# Patient Record
Sex: FEMALE | Race: WHITE | Employment: UNEMPLOYED | URBAN - METROPOLITAN AREA
[De-identification: names, ages, dates, MRNs, and addresses within clinical notes are randomized per-mention and may not be internally consistent; named-entity substitution may affect disease eponyms.]

---

## 2018-03-15 ENCOUNTER — HOSPITAL ENCOUNTER (INPATIENT)
Age: 58
LOS: 5 days | Discharge: HOME HEALTH | DRG: 637 | End: 2018-03-20
Attending: EMERGENCY MEDICINE | Admitting: EMERGENCY MEDICINE
Payer: MEDICARE

## 2018-03-15 DIAGNOSIS — E08.10 DIABETIC KETOACIDOSIS WITHOUT COMA ASSOCIATED WITH DIABETES MELLITUS DUE TO UNDERLYING CONDITION (HCC): Primary | ICD-10-CM

## 2018-03-15 PROBLEM — E11.10 DKA (DIABETIC KETOACIDOSES): Status: ACTIVE | Noted: 2018-03-15

## 2018-03-15 PROBLEM — N17.9 AKI (ACUTE KIDNEY INJURY) (HCC): Status: ACTIVE | Noted: 2018-03-15

## 2018-03-15 LAB
ADMINISTERED INITIALS, ADMINIT: NORMAL
ALBUMIN SERPL-MCNC: 3.6 G/DL (ref 3.5–5)
ALBUMIN/GLOB SERPL: 1.2 {RATIO} (ref 1.1–2.2)
ALP SERPL-CCNC: 142 U/L (ref 45–117)
ALT SERPL-CCNC: 11 U/L (ref 12–78)
ANION GAP SERPL CALC-SCNC: 20 MMOL/L (ref 5–15)
AST SERPL-CCNC: 9 U/L (ref 15–37)
BILIRUB SERPL-MCNC: 0.6 MG/DL (ref 0.2–1)
BUN SERPL-MCNC: 43 MG/DL (ref 6–20)
BUN/CREAT SERPL: 17 (ref 12–20)
CALCIUM SERPL-MCNC: 7.7 MG/DL (ref 8.5–10.1)
CHLORIDE SERPL-SCNC: 94 MMOL/L (ref 97–108)
CO2 SERPL-SCNC: 19 MMOL/L (ref 21–32)
CREAT SERPL-MCNC: 2.48 MG/DL (ref 0.55–1.02)
D50 ADMINISTERED, D50ADM: 0 ML
D50 ORDER, D50ORD: 0 ML
EST. AVERAGE GLUCOSE BLD GHB EST-MCNC: 189 MG/DL
GLOBULIN SER CALC-MCNC: 3.1 G/DL (ref 2–4)
GLSCOM COMMENTS: NORMAL
GLUCOSE BLD STRIP.AUTO-MCNC: 183 MG/DL (ref 65–100)
GLUCOSE BLD STRIP.AUTO-MCNC: 185 MG/DL (ref 65–100)
GLUCOSE BLD STRIP.AUTO-MCNC: 208 MG/DL (ref 65–100)
GLUCOSE BLD STRIP.AUTO-MCNC: 271 MG/DL (ref 65–100)
GLUCOSE BLD STRIP.AUTO-MCNC: 285 MG/DL (ref 65–100)
GLUCOSE BLD STRIP.AUTO-MCNC: 341 MG/DL (ref 65–100)
GLUCOSE SERPL-MCNC: 324 MG/DL (ref 65–100)
GLUCOSE, GLC: 183 MG/DL
GLUCOSE, GLC: 185 MG/DL
GLUCOSE, GLC: 208 MG/DL
GLUCOSE, GLC: 271 MG/DL
GLUCOSE, GLC: 285 MG/DL
HBA1C MFR BLD: 8.2 % (ref 4.2–6.3)
HIGH TARGET, HITG: 300 MG/DL
INSULIN ADMINSTERED, INSADM: 0 UNITS/HOUR
INSULIN ADMINSTERED, INSADM: 1.2 UNITS/HOUR
INSULIN ADMINSTERED, INSADM: 3 UNITS/HOUR
INSULIN ADMINSTERED, INSADM: 4.2 UNITS/HOUR
INSULIN ADMINSTERED, INSADM: 4.5 UNITS/HOUR
INSULIN ORDER, INSORD: 0 UNITS/HOUR
INSULIN ORDER, INSORD: 1.2 UNITS/HOUR
INSULIN ORDER, INSORD: 3 UNITS/HOUR
INSULIN ORDER, INSORD: 4.2 UNITS/HOUR
INSULIN ORDER, INSORD: 4.5 UNITS/HOUR
LOW TARGET, LOT: 200 MG/DL
MINUTES UNTIL NEXT BG, NBG: 60 MIN
MULTIPLIER, MUL: 0
MULTIPLIER, MUL: 0.01
MULTIPLIER, MUL: 0.02
ORDER INITIALS, ORDINIT: NORMAL
POTASSIUM SERPL-SCNC: 3.8 MMOL/L (ref 3.5–5.1)
PROT SERPL-MCNC: 6.7 G/DL (ref 6.4–8.2)
SERVICE CMNT-IMP: ABNORMAL
SODIUM SERPL-SCNC: 133 MMOL/L (ref 136–145)

## 2018-03-15 PROCEDURE — 74011000250 HC RX REV CODE- 250: Performed by: EMERGENCY MEDICINE

## 2018-03-15 PROCEDURE — 96375 TX/PRO/DX INJ NEW DRUG ADDON: CPT

## 2018-03-15 PROCEDURE — 96366 THER/PROPH/DIAG IV INF ADDON: CPT

## 2018-03-15 PROCEDURE — 96365 THER/PROPH/DIAG IV INF INIT: CPT

## 2018-03-15 PROCEDURE — 77030029684 HC NEB SM VOL KT MONA -A

## 2018-03-15 PROCEDURE — 99285 EMERGENCY DEPT VISIT HI MDM: CPT

## 2018-03-15 PROCEDURE — 82962 GLUCOSE BLOOD TEST: CPT

## 2018-03-15 PROCEDURE — 83036 HEMOGLOBIN GLYCOSYLATED A1C: CPT | Performed by: EMERGENCY MEDICINE

## 2018-03-15 PROCEDURE — 74011250636 HC RX REV CODE- 250/636: Performed by: EMERGENCY MEDICINE

## 2018-03-15 PROCEDURE — 80053 COMPREHEN METABOLIC PANEL: CPT | Performed by: EMERGENCY MEDICINE

## 2018-03-15 PROCEDURE — 36415 COLL VENOUS BLD VENIPUNCTURE: CPT | Performed by: EMERGENCY MEDICINE

## 2018-03-15 PROCEDURE — 74011636637 HC RX REV CODE- 636/637: Performed by: EMERGENCY MEDICINE

## 2018-03-15 PROCEDURE — 65660000000 HC RM CCU STEPDOWN

## 2018-03-15 PROCEDURE — 74011000258 HC RX REV CODE- 258: Performed by: EMERGENCY MEDICINE

## 2018-03-15 PROCEDURE — 99284 EMERGENCY DEPT VISIT MOD MDM: CPT

## 2018-03-15 RX ORDER — DEXTROSE 50 % IN WATER (D50W) INTRAVENOUS SYRINGE
12.5-25 AS NEEDED
Status: DISCONTINUED | OUTPATIENT
Start: 2018-03-15 | End: 2018-03-17 | Stop reason: SDUPTHER

## 2018-03-15 RX ORDER — LEVOTHYROXINE SODIUM 150 UG/1
100 TABLET ORAL
COMMUNITY
End: 2022-03-30

## 2018-03-15 RX ORDER — DIPHENHYDRAMINE HCL 25 MG
25 CAPSULE ORAL
Status: DISCONTINUED | OUTPATIENT
Start: 2018-03-15 | End: 2018-03-20 | Stop reason: HOSPADM

## 2018-03-15 RX ORDER — NALOXONE HYDROCHLORIDE 0.4 MG/ML
0.4 INJECTION, SOLUTION INTRAMUSCULAR; INTRAVENOUS; SUBCUTANEOUS AS NEEDED
Status: DISCONTINUED | OUTPATIENT
Start: 2018-03-15 | End: 2018-03-20 | Stop reason: HOSPADM

## 2018-03-15 RX ORDER — HYDROCODONE BITARTRATE AND ACETAMINOPHEN 5; 325 MG/1; MG/1
1 TABLET ORAL
Status: DISCONTINUED | OUTPATIENT
Start: 2018-03-15 | End: 2018-03-20 | Stop reason: HOSPADM

## 2018-03-15 RX ORDER — SODIUM CHLORIDE 0.9 % (FLUSH) 0.9 %
5-10 SYRINGE (ML) INJECTION EVERY 8 HOURS
Status: DISCONTINUED | OUTPATIENT
Start: 2018-03-15 | End: 2018-03-20 | Stop reason: HOSPADM

## 2018-03-15 RX ORDER — SODIUM CHLORIDE 9 MG/ML
100 INJECTION, SOLUTION INTRAVENOUS CONTINUOUS
Status: DISCONTINUED | OUTPATIENT
Start: 2018-03-15 | End: 2018-03-17

## 2018-03-15 RX ORDER — FAMOTIDINE 10 MG/ML
20 INJECTION INTRAVENOUS DAILY
Status: DISCONTINUED | OUTPATIENT
Start: 2018-03-15 | End: 2018-03-16

## 2018-03-15 RX ORDER — SODIUM CHLORIDE 0.9 % (FLUSH) 0.9 %
5-10 SYRINGE (ML) INJECTION AS NEEDED
Status: DISCONTINUED | OUTPATIENT
Start: 2018-03-15 | End: 2018-03-20 | Stop reason: HOSPADM

## 2018-03-15 RX ORDER — AMLODIPINE BESYLATE 5 MG/1
5 TABLET ORAL DAILY
Status: DISCONTINUED | OUTPATIENT
Start: 2018-03-16 | End: 2018-03-17

## 2018-03-15 RX ORDER — ONDANSETRON 2 MG/ML
4 INJECTION INTRAMUSCULAR; INTRAVENOUS
Status: DISCONTINUED | OUTPATIENT
Start: 2018-03-15 | End: 2018-03-20 | Stop reason: HOSPADM

## 2018-03-15 RX ORDER — HEPARIN SODIUM 5000 [USP'U]/ML
5000 INJECTION, SOLUTION INTRAVENOUS; SUBCUTANEOUS EVERY 8 HOURS
Status: DISCONTINUED | OUTPATIENT
Start: 2018-03-15 | End: 2018-03-18

## 2018-03-15 RX ORDER — INSULIN LISPRO 100 [IU]/ML
INJECTION, SOLUTION INTRAVENOUS; SUBCUTANEOUS
Status: DISCONTINUED | OUTPATIENT
Start: 2018-03-15 | End: 2018-03-16

## 2018-03-15 RX ORDER — ASPIRIN 81 MG/1
81 TABLET ORAL DAILY
Status: DISCONTINUED | OUTPATIENT
Start: 2018-03-16 | End: 2018-03-20 | Stop reason: HOSPADM

## 2018-03-15 RX ORDER — METOCLOPRAMIDE HYDROCHLORIDE 5 MG/ML
5 INJECTION INTRAMUSCULAR; INTRAVENOUS EVERY 6 HOURS
Status: DISCONTINUED | OUTPATIENT
Start: 2018-03-16 | End: 2018-03-16

## 2018-03-15 RX ORDER — MAGNESIUM SULFATE 100 %
4 CRYSTALS MISCELLANEOUS AS NEEDED
Status: DISCONTINUED | OUTPATIENT
Start: 2018-03-15 | End: 2018-03-17 | Stop reason: SDUPTHER

## 2018-03-15 RX ORDER — LEVOTHYROXINE SODIUM 150 UG/1
150 TABLET ORAL
Status: DISCONTINUED | OUTPATIENT
Start: 2018-03-16 | End: 2018-03-20 | Stop reason: HOSPADM

## 2018-03-15 RX ORDER — ONDANSETRON 2 MG/ML
4 INJECTION INTRAMUSCULAR; INTRAVENOUS
Status: COMPLETED | OUTPATIENT
Start: 2018-03-15 | End: 2018-03-15

## 2018-03-15 RX ORDER — POTASSIUM CHLORIDE 750 MG/1
10 TABLET, FILM COATED, EXTENDED RELEASE ORAL 2 TIMES DAILY
COMMUNITY
End: 2018-03-20

## 2018-03-15 RX ORDER — IPRATROPIUM BROMIDE AND ALBUTEROL SULFATE 2.5; .5 MG/3ML; MG/3ML
3 SOLUTION RESPIRATORY (INHALATION)
Status: DISCONTINUED | OUTPATIENT
Start: 2018-03-15 | End: 2018-03-16

## 2018-03-15 RX ORDER — ACETAMINOPHEN 325 MG/1
650 TABLET ORAL
Status: DISCONTINUED | OUTPATIENT
Start: 2018-03-15 | End: 2018-03-20 | Stop reason: HOSPADM

## 2018-03-15 RX ADMIN — SODIUM CHLORIDE 100 ML/HR: 900 INJECTION, SOLUTION INTRAVENOUS at 21:35

## 2018-03-15 RX ADMIN — SODIUM CHLORIDE 4.5 UNITS/HR: 900 INJECTION, SOLUTION INTRAVENOUS at 18:28

## 2018-03-15 RX ADMIN — HEPARIN SODIUM 5000 UNITS: 5000 INJECTION, SOLUTION INTRAVENOUS; SUBCUTANEOUS at 21:29

## 2018-03-15 RX ADMIN — FAMOTIDINE 20 MG: 10 INJECTION INTRAVENOUS at 21:29

## 2018-03-15 RX ADMIN — ONDANSETRON HYDROCHLORIDE 4 MG: 2 INJECTION, SOLUTION INTRAMUSCULAR; INTRAVENOUS at 17:24

## 2018-03-15 NOTE — ED NOTES
Assumed care of patient via EMS transfer from [facility]. Pt was sent over from [facility] for high blood sugar. Pt was placed on an insulin drip there and was transferred via ambulance receiving 2 units/hr. Pt BS upon arrival 341. Pt complains of fatigue, hunger and thirst but denies pain at this time. Orders received to continue insulin drip per MD Adryan Ziegler.

## 2018-03-15 NOTE — ED PROVIDER NOTES
EMERGENCY DEPARTMENT HISTORY AND PHYSICAL EXAM      Date: 3/15/2018  Patient Name: Ashia Lawrence    History of Presenting Illness     Chief Complaint   Patient presents with    High Blood Sugar     Pt transferred from \[facility]\"" with BS >600. Pt on insulin drip at 2 units/hr. BS here is 341. History Provided By: Patient and EMS    HPI: Ashia Lawrence, 59 y.o. female with PMHx significant for IDDM, hypothyroidism, HTN, hypercholesterolemia, presents via EMS from \[facility]\"" to the ED with cc of moderately severe and intermittent nausea and vomiting over the last three days. Pt recalls missing her dose of insulin last night because she was afraid she would vomit. Pt was dx with DKA with h/o type 2 DM at \[facility]\"" and arrives at [facility] for admission to the hospitalist. Pt's last BG taken at \[facility]\"" was 367 at [phone]. Her repeat BG on arrival to [facility] is 341. She arrives on O2 NC and an insulin drip running at 2u/hour. Pt c/o polyphagia and polydipsia. She specifically denies any F/C, cough, recent illnesses, CP, SOB, abd pain or other sx. BP remains WNL, last BP taken before departure was 124/94. She denies any changes in her doses of insulin. PCP: Kaci Ramsey MD    There are no other complaints, changes, or physical findings at this time.     Current Facility-Administered Medications   Medication Dose Route Frequency Provider Last Rate Last Dose    insulin regular (NOVOLIN R, HUMULIN R) 100 Units in 0.9% sodium chloride 100 mL infusion  0-50 Units/hr IntraVENous TITRATE Claudia Meza DO 4.5 mL/hr at 03/15/18 1828 4.5 Units/hr at 03/15/18 1828    insulin lispro (HUMALOG) injection   SubCUTAneous TIDAC Claudia Meza DO   Stopped at 03/15/18 1650    glucose chewable tablet 16 g  4 Tab Oral PRN Claudia Meza DO        dextrose (D50W) injection syrg 12.5-25 g  12.5-25 g IntraVENous PRN Claudia Meza DO        glucagon (GLUCAGEN) injection 1 mg  1 mg IntraMUSCular PRN Claudia Meza DO         Current Outpatient Prescriptions   Medication Sig Dispense Refill    potassium chloride SR (KLOR-CON 10) 10 mEq tablet Take 1 Tab by mouth daily. 30 Tab 0    amLODIPine (NORVASC) 5 mg tablet Take 5 mg by mouth daily.  silver sulfADIAZINE (SILVADENE) 1 % topical cream Apply  to affected area every other day. 400 g 1    naproxen (NAPROSYN) 500 mg tablet Take one pill every 8 hours as needed for pain. Take with food. 30 Tab 2    HYDROcodone-acetaminophen (NORCO) 5-325 mg per tablet Take  by mouth every six (6) hours as needed.  insulin aspart (NOVOLOG FLEXPEN) 100 unit/mL flexpen 5 Units by SubCUTAneous route Before breakfast, lunch, and dinner.  gabapentin (NEURONTIN) 300 mg capsule Take 300 mg by mouth. 2 tabs in AM  1 tab at lunch  2 tabs in PM      levothyroxine (LEVOXYL) 100 mcg tablet Take  by mouth Daily (before breakfast).  torsemide (DEMADEX) 20 mg tablet Take 40 mg by mouth two (2) times a day.  losartan (COZAAR) 25 mg tablet Take  by mouth daily.  insulin glargine (LANTUS) 100 unit/mL injection 7 Units by SubCUTAneous route nightly.  magnesium oxide (MAG-OX) 400 mg tablet Take 400 mg by mouth daily.  ZINC PO Take  by mouth.  metolazone (ZAROXOLYN) 5 mg tablet Take  by mouth every other day.  ipratropium-albuterol (COMBIVENT RESPIMAT)  mcg/actuation inhaler Take 1 Puff by inhalation every six (6) hours.  aspirin delayed-release 81 mg tablet Take  by mouth daily.          Past History     Past Medical History:  Past Medical History:   Diagnosis Date    Arthritis     Asthma     Bronchitis     GERD (gastroesophageal reflux disease)     Hypercholesterolemia     Hypertension     Hypothyroid     IDDM (insulin dependent diabetes mellitus) (HCC)     Morbid obesity (HCC)     Peripheral neuropathy     Thyroid disease     Venous insufficiency        Past Surgical History:  Past Surgical History:   Procedure Laterality Date    HX AMPUTATION      HX COLONOSCOPY 2015       Family History:  Family History   Problem Relation Age of Onset    COPD Mother     COPD Father        Social History:  Social History   Substance Use Topics    Smoking status: Current Every Day Smoker     Packs/day: 0.50     Types: Cigarettes     Last attempt to quit: 12/12/2012    Smokeless tobacco: Never Used    Alcohol use Yes      Comment: social       Allergies:  No Known Allergies      Review of Systems   Review of Systems   Constitutional: Negative for fatigue and fever. HENT: Negative. Eyes: Negative. Respiratory: Negative for cough, shortness of breath and wheezing. Cardiovascular: Negative for chest pain and leg swelling. Gastrointestinal: Positive for nausea and vomiting. Negative for abdominal pain, blood in stool, constipation and diarrhea. Endocrine: Positive for polydipsia and polyphagia. Genitourinary: Negative for difficulty urinating and dysuria. Musculoskeletal: Negative. Skin: Negative for rash. Allergic/Immunologic: Negative. Neurological: Negative for weakness and numbness. Hematological: Negative. Psychiatric/Behavioral: Negative. Physical Exam   Physical Exam   Constitutional: She is oriented to person, place, and time. She appears well-developed and well-nourished. No distress. HENT:   Head: Normocephalic and atraumatic. Mouth/Throat: Oropharynx is clear and moist. Mucous membranes are dry. Eyes: Conjunctivae and EOM are normal.   Neck: Neck supple. No JVD present. No tracheal deviation present. Cardiovascular: Normal rate, regular rhythm and intact distal pulses. Exam reveals no gallop and no friction rub. No murmur heard. No peripheral edema   Pulmonary/Chest: Effort normal and breath sounds normal. No stridor. No respiratory distress. She has no wheezes. Abdominal: Soft. Bowel sounds are normal. She exhibits no distension and no mass. There is no tenderness. There is no guarding.    Musculoskeletal: Normal range of motion. She exhibits no edema or tenderness. No deformity   Neurological: She is alert and oriented to person, place, and time. She has normal strength. No focal deficits   Skin: Skin is warm, dry and intact. No rash noted. Psychiatric: She has a normal mood and affect. Her behavior is normal. Judgment and thought content normal.   Nursing note and vitals reviewed. Diagnostic Study Results     Labs -     Recent Results (from the past 12 hour(s))   GLUCOSE, POC, BEDSIDE    Collection Time: 03/15/18 10:40 AM   Result Value Ref Range    Glucose (POC) >600 () 65 - 100 mg/dL    Performed by [id number]     CBC WITH AUTOMATED DIFF    Collection Time: 03/15/18 10:45 AM   Result Value Ref Range    WBC 14.0 (H) 3.6 - 11.0 K/uL    RBC 5.74 (H) 3.80 - 5.20 M/uL    HGB 14.1 11.5 - 16.0 g/dL    HCT 46.9 35.0 - 47.0 %    MCV 81.7 80.0 - 99.0 FL    MCH 24.6 (L) 26.0 - 34.0 PG    MCHC 30.1 30.0 - 36.5 g/dL    RDW 14.6 (H) 11.5 - 14.5 %    PLATELET 284 375 - 425 K/uL    MPV 11.0 8.9 - 12.9 FL    NRBC 0.0 0  WBC    ABSOLUTE NRBC 0.00 0.00 - 0.01 K/uL    NEUTROPHILS 91 (H) 32 - 75 %    LYMPHOCYTES 4 (L) 12 - 49 %    MONOCYTES 4 (L) 5 - 13 %    EOSINOPHILS 0 0 - 7 %    BASOPHILS 0 0 - 1 %    IMMATURE GRANULOCYTES 1 (H) 0.0 - 0.5 %    ABS. NEUTROPHILS 12.7 (H) 1.8 - 8.0 K/UL    ABS. LYMPHOCYTES 0.6 (L) 0.8 - 3.5 K/UL    ABS. MONOCYTES 0.6 0.0 - 1.0 K/UL    ABS. EOSINOPHILS 0.0 0.0 - 0.4 K/UL    ABS. BASOPHILS 0.0 0.0 - 0.1 K/UL    ABS. IMM.  GRANS. 0.1 (H) 0.00 - 0.04 K/UL    DF SMEAR SCANNED      RBC COMMENTS NORMOCYTIC, NORMOCHROMIC     METABOLIC PANEL, COMPREHENSIVE    Collection Time: 03/15/18 10:45 AM   Result Value Ref Range    Sodium 133 (L) 136 - 145 mmol/L    Potassium 4.4 3.5 - 5.1 mmol/L    Chloride 87 (L) 97 - 108 mmol/L    CO2 10 (LL) 21 - 32 mmol/L    Anion gap 36 (H) 5 - 15 mmol/L    Glucose 680 (HH) 65 - 100 mg/dL    BUN 41 (H) 7.0 - 18.0 MG/DL    Creatinine 2.49 (H) 0.55 - 1.02 MG/DL    BUN/Creatinine ratio 16 12 - 20      GFR est AA 24 (L) >60 ml/min/1.73m2    GFR est non-AA 20 (L) >60 ml/min/1.73m2    Calcium 9.0 8.5 - 10.1 MG/DL    Bilirubin, total 0.8 0.2 - 1.0 MG/DL    ALT (SGPT) 15 14 - 63 U/L    AST (SGOT) 6 (L) 15 - 37 U/L    Alk.  phosphatase 165 (H) 45 - 117 U/L    Protein, total 7.7 6.4 - 8.2 g/dL    Albumin 4.0 3.5 - 5.0 g/dL    Globulin 3.7 2.0 - 4.0 g/dL    A-G Ratio 1.1 1.1 - 2.2     TSH 3RD GENERATION    Collection Time: 03/15/18 10:45 AM   Result Value Ref Range    TSH 2.45 0.34 - 4.82 uIU/mL   MAGNESIUM    Collection Time: 03/15/18 10:45 AM   Result Value Ref Range    Magnesium 1.8 1.6 - 2.4 mg/dL   URINALYSIS W/ RFLX MICROSCOPIC    Collection Time: 03/15/18 10:45 AM   Result Value Ref Range    Color YELLOW/STRAW      Appearance CLEAR CLEAR      Specific gravity 1.025 1.003 - 1.030      pH (UA) 5.5 5.0 - 8.0      Protein 30 (A) NEG mg/dL    Glucose 500 (A) NEG mg/dL    Ketone 80 (A) NEG mg/dL    Blood TRACE (A) NEG      Urobilinogen 0.2 0.2 - 1.0 EU/dL    Nitrites NEGATIVE  NEG      Leukocyte Esterase NEGATIVE  NEG     BILIRUBIN, CONFIRM    Collection Time: 03/15/18 10:45 AM   Result Value Ref Range    Bilirubin UA, confirm NEGATIVE  NEG     URINE MICROSCOPIC ONLY    Collection Time: 03/15/18 10:45 AM   Result Value Ref Range    WBC 0-4 0 - 4 /hpf    RBC 0-5 0 - 5 /hpf    Epithelial cells FEW FEW /lpf    Bacteria NEGATIVE  NEG /hpf   ACETONE/KETONE, QL    Collection Time: 03/15/18 12:30 PM   Result Value Ref Range    Acetone/Ketone serum, QL. POSITIVE (A) NEG        GLUCOSE, POC, BEDSIDE    Collection Time: 03/15/18 12:31 PM   Result Value Ref Range    Glucose (POC) 483 (H) 65 - 100 mg/dL    Performed by [id number]     POC G3 - PUL    Collection Time: 03/15/18  1:25 PM   Result Value Ref Range    FIO2 (POC) 28 %    pH (POC) 7.119 (LL) 7.35 - 7.45      pCO2 (POC) 36.0 35.0 - 45.0 MMHG    pO2 (POC) 82 80 - 100 MMHG    HCO3 (POC) 11.7 (L) 22 - 26 MMOL/L    sO2 (POC) 91 (L) 92 - 97 %    Base deficit (POC) 18 mmol/L    Site LEFT RADIAL      Device: NASAL CANNULA      Flow rate (POC) 2 L/M    Allens test (POC) YES      Specimen type (POC) ARTERIAL     EKG, 12 LEAD, INITIAL    Collection Time: 03/15/18  1:34 PM   Result Value Ref Range    Ventricular Rate 80 BPM    Atrial Rate 80 BPM    P-R Interval 150 ms    QRS Duration 130 ms    Q-T Interval 436 ms    QTC Calculation (Bezet) 502 ms    Calculated P Axis 72 degrees    Calculated R Axis 98 degrees    Calculated T Axis 37 degrees    Diagnosis       Normal sinus rhythm  Possible Left atrial enlargement  Right bundle branch block  Abnormal ECG  When compared with ECG of 24-JAN-2018 06:47,  premature ventricular complexes are no longer present  Right bundle branch block has replaced Incomplete right bundle branch block     GLUCOSE, POC, BEDSIDE    Collection Time: 03/15/18  1:40 PM   Result Value Ref Range    Glucose (POC) 401 (H) 65 - 100 mg/dL    Performed by [id number]     GLUCOSE, POC, BEDSIDE    Collection Time: 03/15/18  2:35 PM   Result Value Ref Range    Glucose (POC) 367 (H) 65 - 100 mg/dL    Performed by [id number]     GLUCOSE, POC    Collection Time: 03/15/18  4:40 PM   Result Value Ref Range    Glucose (POC) 341 (H) 65 - 100 mg/dL    Performed by Barb Burns    METABOLIC PANEL, COMPREHENSIVE    Collection Time: 03/15/18  5:25 PM   Result Value Ref Range    Sodium 133 (L) 136 - 145 mmol/L    Potassium 3.8 3.5 - 5.1 mmol/L    Chloride 94 (L) 97 - 108 mmol/L    CO2 19 (L) 21 - 32 mmol/L    Anion gap 20 (H) 5 - 15 mmol/L    Glucose 324 (H) 65 - 100 mg/dL    BUN 43 (H) 6 - 20 MG/DL    Creatinine 2.48 (H) 0.55 - 1.02 MG/DL    BUN/Creatinine ratio 17 12 - 20      GFR est AA 24 (L) >60 ml/min/1.73m2    GFR est non-AA 20 (L) >60 ml/min/1.73m2    Calcium 7.7 (L) 8.5 - 10.1 MG/DL    Bilirubin, total 0.6 0.2 - 1.0 MG/DL    ALT (SGPT) 11 (L) 12 - 78 U/L    AST (SGOT) 9 (L) 15 - 37 U/L    Alk.  phosphatase 142 (H) 45 - 117 U/L    Protein, total 6.7 6.4 - 8.2 g/dL    Albumin 3.6 3.5 - 5.0 g/dL    Globulin 3.1 2.0 - 4.0 g/dL    A-G Ratio 1.2 1.1 - 2.2     GLUCOSE, POC    Collection Time: 03/15/18  5:56 PM   Result Value Ref Range    Glucose (POC) 285 (H) 65 - 100 mg/dL    Performed by Patrick Pelayo    Collection Time: 03/15/18  6:29 PM   Result Value Ref Range    Glucose 285 mg/dL    Insulin order 4.5 units/hour    Insulin adminstered 4.5 units/hour    Multiplier 0.020     Low target 200 mg/dL    High target 300 mg/dL    D50 order 0.0 ml    D50 administered 0.00 ml    Minutes until next BG 60 min    Order initials RKJ     Administered initials LEG     GLSCOM Comments         Radiologic Studies -   No orders to display     CT Results  (Last 48 hours)    None        CXR Results  (Last 48 hours)               03/15/18 1347  XR CHEST PORT Final result    Impression:  IMPRESSION:         No acute cardiopulmonary process. Narrative:  CHEST, FRONTAL VIEW       INDICATION: Hyperglycemia. Weakness. COMPARISON: None. FINDINGS:        The heart and mediastinal structures are normal.  There is no acute airspace   consolidation, pleural fluid or pneumothorax. Pulmonary vascularity is normal.   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 No acute osseous findings. Medical Decision Making   I am the first provider for this patient. I reviewed the vital signs, available nursing notes, past medical history, past surgical history, family history and social history. Vital Signs-Reviewed the patient's vital signs. Patient Vitals for the past 12 hrs:   Temp Pulse Resp BP SpO2   03/15/18 1653 98.5 °F (36.9 °C) 80 16 155/62 93 %       Pulse Oximetry Analysis - 93% on RA      Records Reviewed: Nursing Notes and Old Medical Records    Provider Notes (Medical Decision Making):   Pt transferred from [facility] for DKA. Pt received 2 L IVF, is on insulin gtt. BP now stable.  Will recheck CMP, continue insulin gtt, and admit to hospitalist.     ED Course:   Initial assessment performed. The patients presenting problems have been discussed, and they are in agreement with the care plan formulated and outlined with them. I have encouraged them to ask questions as they arise throughout their visit. 4:45 PM  Discussed plan of care with pt including hospitalist admission. Pt agreeable with plan. Consult Note:   4:50 PM  Messi Donahue DO spoke with Roseann Silva MD   Specialty: Hospitalist  Discussed pt's hx, disposition, and available diagnostic and imaging results. Reviewed care plans. Consultant will evaluate pt for admission. Written by Flavio Denny ED Scribe, as dictated by Messi Donahue DO. Critical Care Time:   CRITICAL CARE NOTE:  5:41 PM  IMPENDING DETERIORATION -Metabolic, Renal  ASSOCIATED RISK FACTORS - Metabolic changes and Dehydration  MANAGEMENT- Bedside Assessment and Supervision of Care  INTERPRETATION - Labwork  INTERVENTIONS - hemodynamic mngmt and Metobolic interventions  CASE REVIEW - Hospitalist  TREATMENT RESPONSE -Improved  PERFORMED BY - Self    NOTES:   I have spent 30 minutes of critical care time involved in lab review, consultations with specialist, family decision- making, bedside attention and documentation. During this entire length of time I was immediately available to the patient. Messi Donahue DO     Disposition:  Admitted    Admit Note:  4:50 PM  Pt is being admitted by Dr. Roseann Silva hospitalist. The results of their tests and reason(s) for their admission have been discussed with pt and/or available family. They convey agreement and understanding for the need to be admitted and for admission diagnosis. PLAN: admit to hospitalist    Diagnosis     Clinical Impression:   1. Diabetic ketoacidosis without coma associated with diabetes mellitus due to underlying condition [facility])        Attestations:     This note is prepared by Flavio Denny, acting as Scribe for Messi Donahue DO. The scribe's documentation has been prepared under my direction and personally reviewed by me in its entirety. I confirm that the note above accurately reflects all work, treatment, procedures, and medical decision making performed by me.

## 2018-03-15 NOTE — PROGRESS NOTES
Pharmacy Clarification of Prior to Admission Medication Regimen     The patient was not interviewed regarding clarification of the prior to admission medication regimen due to AMS. MHT called the patient's mother, Osvaldo Hunter, [phone], who was able to verify the patient's PTA medication regimen and the last doses administered. Information Obtained From: Rx Query and patient's mother    Pertinent Pharmacy Findings:   Updated patients preferred outpatient pharmacy to: [place] [id number] ipratropium-albuterol (COMBIVENT RESPIMAT)  mcg/actuation inhaler: Patient's mother stated the patient has this agent at home and uses it \"as needed. \"    PTA medication list was corrected to the following:     Prior to Admission Medications   Prescriptions Last Dose Informant Patient Reported? Taking? ZINC PO 3/14/2018 at Unknown time Parent Yes Yes   Sig: Take 1 Tab by mouth two (2) times a day. amLODIPine (NORVASC) 5 mg tablet 3/14/2018 at Unknown time Parent Yes Yes   Sig: Take 5 mg by mouth daily. aspirin delayed-release 81 mg tablet 3/14/2018 at Unknown time Parent Yes Yes   Sig: Take 81 mg by mouth daily. gabapentin (NEURONTIN) 300 mg capsule 3/13/2018 at Unknown time Parent Yes Yes   Sig: Take 600 mg by mouth nightly. insulin aspart (NOVOLOG FLEXPEN) 100 unit/mL flexpen 3/14/2018 at Unknown time Parent Yes Yes   Si Units by SubCUTAneous route Before breakfast, lunch, and dinner. insulin glargine (LANTUS) 100 unit/mL injection 3/14/2018 at Unknown time Parent Yes Yes   Si Units by SubCUTAneous route nightly. ipratropium-albuterol (COMBIVENT RESPIMAT)  mcg/actuation inhaler Not Taking at Unknown time Parent Yes No   Sig: Take 1 Puff by inhalation every six (6) hours as needed for shortness of breath   levothyroxine (SYNTHROID) 150 mcg tablet 3/14/2018 at Unknown time Parent Yes Yes   Sig: Take 150 mcg by mouth Daily (before breakfast).    losartan (COZAAR) 25 mg tablet 3/14/2018 at Unknown time Parent Yes Yes   Sig: Take 25 mg by mouth daily. potassium chloride SR (KLOR-CON 10) 10 mEq tablet 3/14/2018 at Unknown time Parent Yes Yes   Sig: Take 10 mEq by mouth two (2) times a day. torsemide (DEMADEX) 20 mg tablet 3/14/2018 at Unknown time Parent Yes Yes   Sig: Take 40 mg by mouth two (2) times a day.       Facility-Administered Medications: None          Thank you,  Marie Smith CPhT  Medication History Pharmacy Technician

## 2018-03-15 NOTE — IP AVS SNAPSHOT
[address] [facility] 
[phone] Patient: Emily Oseguera MRN: [id number] About your hospitalization You were admitted on:  March 15, 2018 You last received care in the:  [facility] 2 GENERAL SURGERY You were discharged on:  March 20, 2018 Why you were hospitalized Your primary diagnosis was:  Not on File Your diagnoses also included:  Dka (Diabetic Ketoacidoses) (Formerly [facility]), Grover (Acute Kidney Injury) (Formerly [facility]) Follow-up Information Follow up With Details Comments Contact Info Fidel Boswell MD   [id number] [facility] 
[phone] [address] [place] [address] On 3/20/2018 THIS IS YOUR PRIVATE DUTY CARE AIDE, PLEASE CONTACT THEM WHEN YOU RETURN HOME  [phone] [address] [facility] On 3/20/2018 THIS IS YOUR HOME HEALTH AGENCY. IF YOU DO NOT HEAR FROM THEM WITHIN 24-48HRS, PLEASE CONTACT THEM DIRECTLY [phone] Discharge Orders None A check sarha beth indicates which time of day the medication should be taken. My Medications CHANGE how you take these medications Instructions Each Dose to Equal  
 Morning Noon Evening Bedtime  
 insulin aspart U-100 100 unit/mL Inpn Commonly known as:  NovoLOG Flexpen U-100 Insulin What changed:  additional instructions Your last dose was: Your next dose is:    
   
   
 5 Units by SubCUTAneous route Before breakfast, lunch, and dinner. + SSI TIDAC: 140-199=2 u 200-249=3 u 250-299=5 u 300-349=8 u  
 5 Units  
    
   
   
   
  
 levothyroxine 150 mcg tablet Commonly known as:  SYNTHROID What changed:  Another medication with the same name was removed. Continue taking this medication, and follow the directions you see here. Your last dose was: Your next dose is: Take 150 mcg by mouth Daily (before breakfast). 150 mcg CONTINUE taking these medications Instructions Each Dose to Equal  
 Morning Noon Evening Bedtime  
 aspirin delayed-release 81 mg tablet Your last dose was: Your next dose is: Take 81 mg by mouth daily. 81 mg  
    
   
   
   
  
 COMBIVENT RESPIMAT  mcg/actuation inhaler Generic drug:  ipratropium-albuterol Your last dose was: Your next dose is: Take 1 Puff by inhalation every six (6) hours as needed for Shortness of Breath. 1 Puff  
    
   
   
   
  
 gabapentin 300 mg capsule Commonly known as:  NEURONTIN Your last dose was: Your next dose is: Take 600 mg by mouth nightly. 600 mg  
    
   
   
   
  
 LANTUS U-100 INSULIN 100 unit/mL injection Generic drug:  insulin glargine Your last dose was: Your next dose is:    
   
   
 8 Units by SubCUTAneous route nightly. 8 Units ZINC PO Your last dose was: Your next dose is: Take 1 Tab by mouth two (2) times a day. 1 Tab STOP taking these medications   
 amLODIPine 5 mg tablet Commonly known as:  NORVASC  
   
  
 DEMADEX 20 mg tablet Generic drug:  torsemide  
   
  
 losartan 25 mg tablet Commonly known as:  COZAAR  
   
  
 potassium chloride SR 10 mEq tablet Commonly known as:  KLOR-CON 10 Where to Get Your Medications Information on where to get these meds will be given to you by the nurse or doctor. ! Ask your nurse or doctor about these medications  
  insulin aspart U-100 100 unit/mL Inpn Discharge Instructions HOSPITALIST DISCHARGE INSTRUCTIONS 
 
NAME: Emily Oseguera JOSE:  1960 MRN:  [id number] Date/Time:  3/20/2018 8:37 AM 
 
ADMIT DATE: 3/15/2018 DISCHARGE DATE: 3/20/2018 DISCHARGE DIAGNOSIS: 
Right Foot Pain, Acute Gout flare: resolved Uncontrolled type 2 diabetes with DKA:  
 Acute kidney injury on chronic kidney disease stage III; ANN resolved Leukocytosis: likely reactive and resolved Acute Pancreatitis: POA; resolved; suspect relation to uncontrolled BG Intractable N/V: from DKA, resolved GERD Thrombocytopenia: resolved Chronic venous stasis Hypothyroidism Hypertension Tobacco use and suspected COPD Intellectual Disability: Patient does not have capacity to make medical decisions for herself based on Evaluation by Dr. Green Burkitt on 3/16 MEDICATIONS: 
As per medication reconciliation  list 
· It is important that you take the medication exactly as they are prescribed. · Keep your medication in the bottles provided by the pharmacist and keep a list of the medication names, dosages, and times to be taken in your wallet. · Do not take other medications without consulting your doctor. Pain Management: per above medications What to do at [facility] Recommended diet:  Diabetic Diet Recommended activity: PT/OT Eval and Treat If you experience any of the following symptoms then please call your primary care physician or return to the emergency room if you cannot get hold of your doctor: 
Fever, chills, nausea, vomiting, diarrhea, change in mentation, falling, bleeding, shortness of breath or any other concerning symptoms. Follow Up: 
Dr. Olimpia Lemons MD  Within 1 week. Discuss your diabetes management and blood pressure at this visit. If your restart diuretics (torsemide) then you will need very close moniotring of your kidney function. I also stopped you norvasc as sometimes this medication can cause swelling in the legs. Information obtained by : 
I understand that if any problems occur once I am at home I am to contact my physician. I understand and acknowledge receipt of the instructions indicated above. Physician's or R.N.'s Signature                                                                  Date/Time Patient or Representative Signature                                                          Date/Time [employer] Announcement We are excited to announce that we are making your provider's discharge notes available to you in [employer]. You will see these notes when they are completed and signed by the physician that discharged you from your recent hospital stay. If you have any questions or concerns about any information you see in [employer], please call the Health Information Department where you were seen or reach out to your Primary Care Provider for more information about your plan of care. Introducing [facility] SERVICES! Denisse Gamboa introduces [employer] patient portal. Now you can access parts of your medical record, email your doctor's office, and request medication refills online. 1. In your internet browser, go to [email or web address] 2. Click on the First Time User? Click Here link in the Sign In box. You will see the New Member Sign Up page. 3. Enter your [employer] Access Code exactly as it appears below. You will not need to use this code after youve completed the sign-up process. If you do not sign up before the expiration date, you must request a new code. · [employer] Access Code: [email or web address] Expires: 4/24/2018  9:44 AM 
 
4. Enter the last four digits of your Social Security Number (xxxx) and Date of Birth (mm/dd/yyyy) as indicated and click Submit. You will be taken to the next sign-up page. 5. Create a [employer] ID. This will be your [employer] login ID and cannot be changed, so think of one that is secure and easy to remember. 6. Create a [employer] password. You can change your password at any time. 7. Enter your Password Reset Question and Answer. This can be used at a later time if you forget your password. 8. Enter your e-mail address. You will receive e-mail notification when new information is available in [address] 9. Click Sign Up. You can now view and download portions of your medical record. 10. Click the Download Summary menu link to download a portable copy of your medical information. If you have questions, please visit the Frequently Asked Questions section of the [employer] website. Remember, [employer] is NOT to be used for urgent needs. For medical emergencies, dial 911. Now available from your iPhone and Android! Providers Seen During Your Hospitalization Provider Specialty Primary office phone Ethel Elias DO Emergency Medicine [phone] Nabeel Ibrahim MD Emergency Medicine [phone] Rajeev Rouse MD Internal Medicine [phone] Your Primary Care Physician (PCP) Primary Care Physician Office Phone Office Fax Gene [facility] [phone] You are allergic to the following No active allergies Recent Documentation Height Weight BMI Smoking Status 1.549 m 105.7 kg 44.02 kg/m2 Current Every Day Smoker Emergency Contacts Name Discharge Info Relation Home Work Mobile [facility] DISCHARGE CAREGIVER [3] Parent [1] [phone] Patient Belongings The following personal items are in your possession at time of discharge: 
  Dental Appliances: None  Visual Aid: Glasses, At bedside      Home Medications: None   Jewelry: None  Clothing: None    Other Valuables: Cell Phone, [employer] Please provide this summary of care documentation to your next provider. Signatures-by signing, you are acknowledging that this After Visit Summary has been reviewed with you and you have received a copy.   
  
 
  
    
    
 Patient Signature: ____________________________________________________________ Date:  ____________________________________________________________  
  
Marlyse Leaks Provider Signature:  ____________________________________________________________ Date:  ____________________________________________________________

## 2018-03-16 ENCOUNTER — APPOINTMENT (OUTPATIENT)
Dept: ULTRASOUND IMAGING | Age: 58
DRG: 637 | End: 2018-03-16
Attending: INTERNAL MEDICINE
Payer: MEDICARE

## 2018-03-16 LAB
ADMINISTERED INITIALS, ADMINIT: NORMAL
ALBUMIN SERPL-MCNC: 3.6 G/DL (ref 3.5–5)
ALBUMIN/GLOB SERPL: 1.4 {RATIO} (ref 1.1–2.2)
ALP SERPL-CCNC: 136 U/L (ref 45–117)
ALT SERPL-CCNC: 11 U/L (ref 12–78)
AMYLASE SERPL-CCNC: 155 U/L (ref 25–115)
ANION GAP SERPL CALC-SCNC: 14 MMOL/L (ref 5–15)
ANION GAP SERPL CALC-SCNC: 16 MMOL/L (ref 5–15)
ANION GAP SERPL CALC-SCNC: 7 MMOL/L (ref 5–15)
ANION GAP SERPL CALC-SCNC: 8 MMOL/L (ref 5–15)
APPEARANCE UR: CLEAR
AST SERPL-CCNC: 8 U/L (ref 15–37)
BACTERIA URNS QL MICRO: ABNORMAL /HPF
BASOPHILS # BLD: 0 K/UL (ref 0–0.1)
BASOPHILS NFR BLD: 0 % (ref 0–1)
BILIRUB DIRECT SERPL-MCNC: 0.2 MG/DL (ref 0–0.2)
BILIRUB SERPL-MCNC: 0.8 MG/DL (ref 0.2–1)
BILIRUB UR QL CFM: NEGATIVE
BUN SERPL-MCNC: 44 MG/DL (ref 6–20)
BUN SERPL-MCNC: 46 MG/DL (ref 6–20)
BUN SERPL-MCNC: 47 MG/DL (ref 6–20)
BUN SERPL-MCNC: 48 MG/DL (ref 6–20)
BUN/CREAT SERPL: 17 (ref 12–20)
BUN/CREAT SERPL: 18 (ref 12–20)
CALCIUM SERPL-MCNC: 7.7 MG/DL (ref 8.5–10.1)
CALCIUM SERPL-MCNC: 7.7 MG/DL (ref 8.5–10.1)
CALCIUM SERPL-MCNC: 8 MG/DL (ref 8.5–10.1)
CALCIUM SERPL-MCNC: 8.4 MG/DL (ref 8.5–10.1)
CHLORIDE SERPL-SCNC: 100 MMOL/L (ref 97–108)
CHLORIDE SERPL-SCNC: 100 MMOL/L (ref 97–108)
CHLORIDE SERPL-SCNC: 102 MMOL/L (ref 97–108)
CHLORIDE SERPL-SCNC: 98 MMOL/L (ref 97–108)
CO2 SERPL-SCNC: 19 MMOL/L (ref 21–32)
CO2 SERPL-SCNC: 21 MMOL/L (ref 21–32)
CO2 SERPL-SCNC: 27 MMOL/L (ref 21–32)
CO2 SERPL-SCNC: 28 MMOL/L (ref 21–32)
COLOR UR: ABNORMAL
CREAT SERPL-MCNC: 2.63 MG/DL (ref 0.55–1.02)
CREAT SERPL-MCNC: 2.64 MG/DL (ref 0.55–1.02)
CREAT SERPL-MCNC: 2.64 MG/DL (ref 0.55–1.02)
CREAT SERPL-MCNC: 2.76 MG/DL (ref 0.55–1.02)
CREAT UR-MCNC: 163 MG/DL
D50 ADMINISTERED, D50ADM: 0 ML
D50 ORDER, D50ORD: 0 ML
DIFFERENTIAL METHOD BLD: ABNORMAL
EOSINOPHIL # BLD: 0 K/UL (ref 0–0.4)
EOSINOPHIL NFR BLD: 0 % (ref 0–7)
EPITH CASTS URNS QL MICRO: ABNORMAL /LPF
ERYTHROCYTE [DISTWIDTH] IN BLOOD BY AUTOMATED COUNT: 15 % (ref 11.5–14.5)
EST. AVERAGE GLUCOSE BLD GHB EST-MCNC: 183 MG/DL
GLOBULIN SER CALC-MCNC: 2.6 G/DL (ref 2–4)
GLSCOM COMMENTS: NORMAL
GLUCOSE BLD STRIP.AUTO-MCNC: 124 MG/DL (ref 65–100)
GLUCOSE BLD STRIP.AUTO-MCNC: 125 MG/DL (ref 65–100)
GLUCOSE BLD STRIP.AUTO-MCNC: 145 MG/DL (ref 65–100)
GLUCOSE BLD STRIP.AUTO-MCNC: 150 MG/DL (ref 65–100)
GLUCOSE BLD STRIP.AUTO-MCNC: 154 MG/DL (ref 65–100)
GLUCOSE BLD STRIP.AUTO-MCNC: 165 MG/DL (ref 65–100)
GLUCOSE BLD STRIP.AUTO-MCNC: 167 MG/DL (ref 65–100)
GLUCOSE BLD STRIP.AUTO-MCNC: 187 MG/DL (ref 65–100)
GLUCOSE BLD STRIP.AUTO-MCNC: 190 MG/DL (ref 65–100)
GLUCOSE BLD STRIP.AUTO-MCNC: 196 MG/DL (ref 65–100)
GLUCOSE BLD STRIP.AUTO-MCNC: 200 MG/DL (ref 65–100)
GLUCOSE BLD STRIP.AUTO-MCNC: 207 MG/DL (ref 65–100)
GLUCOSE BLD STRIP.AUTO-MCNC: 212 MG/DL (ref 65–100)
GLUCOSE BLD STRIP.AUTO-MCNC: 220 MG/DL (ref 65–100)
GLUCOSE BLD STRIP.AUTO-MCNC: 254 MG/DL (ref 65–100)
GLUCOSE BLD STRIP.AUTO-MCNC: 257 MG/DL (ref 65–100)
GLUCOSE BLD STRIP.AUTO-MCNC: 257 MG/DL (ref 65–100)
GLUCOSE BLD STRIP.AUTO-MCNC: 267 MG/DL (ref 65–100)
GLUCOSE SERPL-MCNC: 158 MG/DL (ref 65–100)
GLUCOSE SERPL-MCNC: 159 MG/DL (ref 65–100)
GLUCOSE SERPL-MCNC: 260 MG/DL (ref 65–100)
GLUCOSE SERPL-MCNC: 286 MG/DL (ref 65–100)
GLUCOSE UR STRIP.AUTO-MCNC: NEGATIVE MG/DL
GLUCOSE, GLC: 124 MG/DL
GLUCOSE, GLC: 125 MG/DL
GLUCOSE, GLC: 145 MG/DL
GLUCOSE, GLC: 150 MG/DL
GLUCOSE, GLC: 154 MG/DL
GLUCOSE, GLC: 165 MG/DL
GLUCOSE, GLC: 167 MG/DL
GLUCOSE, GLC: 187 MG/DL
GLUCOSE, GLC: 190 MG/DL
GLUCOSE, GLC: 200 MG/DL
GLUCOSE, GLC: 207 MG/DL
GLUCOSE, GLC: 220 MG/DL
GLUCOSE, GLC: 254 MG/DL
GLUCOSE, GLC: 257 MG/DL
GLUCOSE, GLC: 257 MG/DL
GLUCOSE, GLC: 267 MG/DL
HBA1C MFR BLD: 8 % (ref 4.2–6.3)
HCT VFR BLD AUTO: 41.3 % (ref 35–47)
HGB BLD-MCNC: 12.6 G/DL (ref 11.5–16)
HGB UR QL STRIP: NEGATIVE
HIGH TARGET, HITG: 250 MG/DL
HIGH TARGET, HITG: 300 MG/DL
IMM GRANULOCYTES # BLD: 0.1 K/UL (ref 0–0.04)
IMM GRANULOCYTES NFR BLD AUTO: 0 % (ref 0–0.5)
INSULIN ADMINSTERED, INSADM: 0.1 UNITS/HOUR
INSULIN ADMINSTERED, INSADM: 0.9 UNITS/HOUR
INSULIN ADMINSTERED, INSADM: 1.3 UNITS/HOUR
INSULIN ADMINSTERED, INSADM: 1.3 UNITS/HOUR
INSULIN ADMINSTERED, INSADM: 1.5 UNITS/HOUR
INSULIN ADMINSTERED, INSADM: 1.8 UNITS/HOUR
INSULIN ADMINSTERED, INSADM: 1.9 UNITS/HOUR
INSULIN ADMINSTERED, INSADM: 2.1 UNITS/HOUR
INSULIN ADMINSTERED, INSADM: 2.1 UNITS/HOUR
INSULIN ADMINSTERED, INSADM: 3.8 UNITS/HOUR
INSULIN ADMINSTERED, INSADM: 3.9 UNITS/HOUR
INSULIN ADMINSTERED, INSADM: 5.2 UNITS/HOUR
INSULIN ADMINSTERED, INSADM: 5.9 UNITS/HOUR
INSULIN ADMINSTERED, INSADM: 7.9 UNITS/HOUR
INSULIN ORDER, INSORD: 0.1 UNITS/HOUR
INSULIN ORDER, INSORD: 0.9 UNITS/HOUR
INSULIN ORDER, INSORD: 1.3 UNITS/HOUR
INSULIN ORDER, INSORD: 1.3 UNITS/HOUR
INSULIN ORDER, INSORD: 1.5 UNITS/HOUR
INSULIN ORDER, INSORD: 1.8 UNITS/HOUR
INSULIN ORDER, INSORD: 1.9 UNITS/HOUR
INSULIN ORDER, INSORD: 2.1 UNITS/HOUR
INSULIN ORDER, INSORD: 2.1 UNITS/HOUR
INSULIN ORDER, INSORD: 3.8 UNITS/HOUR
INSULIN ORDER, INSORD: 3.9 UNITS/HOUR
INSULIN ORDER, INSORD: 5.2 UNITS/HOUR
INSULIN ORDER, INSORD: 5.9 UNITS/HOUR
INSULIN ORDER, INSORD: 7.9 UNITS/HOUR
KETONES UR QL STRIP.AUTO: ABNORMAL MG/DL
LEUKOCYTE ESTERASE UR QL STRIP.AUTO: ABNORMAL
LIPASE SERPL-CCNC: 1205 U/L (ref 73–393)
LOW TARGET, LOT: 150 MG/DL
LOW TARGET, LOT: 200 MG/DL
LYMPHOCYTES # BLD: 1 K/UL (ref 0.8–3.5)
LYMPHOCYTES NFR BLD: 7 % (ref 12–49)
MAGNESIUM SERPL-MCNC: 1.6 MG/DL (ref 1.6–2.4)
MCH RBC QN AUTO: 24.2 PG (ref 26–34)
MCHC RBC AUTO-ENTMCNC: 30.5 G/DL (ref 30–36.5)
MCV RBC AUTO: 79.4 FL (ref 80–99)
MINUTES UNTIL NEXT BG, NBG: 60 MIN
MONOCYTES # BLD: 0.8 K/UL (ref 0–1)
MONOCYTES NFR BLD: 6 % (ref 5–13)
MULTIPLIER, MUL: 0
MULTIPLIER, MUL: 0.01
MULTIPLIER, MUL: 0.02
MULTIPLIER, MUL: 0.03
MULTIPLIER, MUL: 0.03
MULTIPLIER, MUL: 0.04
NEUTS SEG # BLD: 11.4 K/UL (ref 1.8–8)
NEUTS SEG NFR BLD: 86 % (ref 32–75)
NITRITE UR QL STRIP.AUTO: NEGATIVE
NRBC # BLD: 0 K/UL (ref 0–0.01)
NRBC BLD-RTO: 0 PER 100 WBC
ORDER INITIALS, ORDINIT: NORMAL
PH UR STRIP: 5.5 [PH] (ref 5–8)
PHOSPHATE SERPL-MCNC: 3.7 MG/DL (ref 2.6–4.7)
PLATELET # BLD AUTO: 245 K/UL (ref 150–400)
PMV BLD AUTO: 10 FL (ref 8.9–12.9)
POTASSIUM SERPL-SCNC: 3.6 MMOL/L (ref 3.5–5.1)
POTASSIUM SERPL-SCNC: 3.6 MMOL/L (ref 3.5–5.1)
POTASSIUM SERPL-SCNC: 4.4 MMOL/L (ref 3.5–5.1)
POTASSIUM SERPL-SCNC: 4.5 MMOL/L (ref 3.5–5.1)
PROT SERPL-MCNC: 6.2 G/DL (ref 6.4–8.2)
PROT UR STRIP-MCNC: ABNORMAL MG/DL
PROT UR-MCNC: 28 MG/DL (ref 0–11.9)
PROT/CREAT UR-RTO: 0.2
RBC # BLD AUTO: 5.2 M/UL (ref 3.8–5.2)
RBC #/AREA URNS HPF: ABNORMAL /HPF (ref 0–5)
SERVICE CMNT-IMP: ABNORMAL
SODIUM SERPL-SCNC: 133 MMOL/L (ref 136–145)
SODIUM SERPL-SCNC: 135 MMOL/L (ref 136–145)
SODIUM SERPL-SCNC: 135 MMOL/L (ref 136–145)
SODIUM SERPL-SCNC: 137 MMOL/L (ref 136–145)
SODIUM UR-SCNC: 7 MMOL/L
SP GR UR REFRACTOMETRY: 1.02 (ref 1–1.03)
TSH SERPL DL<=0.05 MIU/L-ACNC: 1.82 UIU/ML (ref 0.36–3.74)
UROBILINOGEN UR QL STRIP.AUTO: 0.2 EU/DL (ref 0.2–1)
WBC # BLD AUTO: 13.3 K/UL (ref 3.6–11)
WBC URNS QL MICRO: ABNORMAL /HPF (ref 0–4)

## 2018-03-16 PROCEDURE — 80048 BASIC METABOLIC PNL TOTAL CA: CPT | Performed by: INTERNAL MEDICINE

## 2018-03-16 PROCEDURE — 74011636637 HC RX REV CODE- 636/637: Performed by: INTERNAL MEDICINE

## 2018-03-16 PROCEDURE — 97116 GAIT TRAINING THERAPY: CPT | Performed by: PHYSICAL THERAPIST

## 2018-03-16 PROCEDURE — 74011000258 HC RX REV CODE- 258: Performed by: EMERGENCY MEDICINE

## 2018-03-16 PROCEDURE — 80048 BASIC METABOLIC PNL TOTAL CA: CPT

## 2018-03-16 PROCEDURE — G8979 MOBILITY GOAL STATUS: HCPCS | Performed by: PHYSICAL THERAPIST

## 2018-03-16 PROCEDURE — 82962 GLUCOSE BLOOD TEST: CPT

## 2018-03-16 PROCEDURE — 83735 ASSAY OF MAGNESIUM: CPT

## 2018-03-16 PROCEDURE — 85025 COMPLETE CBC W/AUTO DIFF WBC: CPT

## 2018-03-16 PROCEDURE — 80076 HEPATIC FUNCTION PANEL: CPT

## 2018-03-16 PROCEDURE — 74011250637 HC RX REV CODE- 250/637: Performed by: EMERGENCY MEDICINE

## 2018-03-16 PROCEDURE — 36415 COLL VENOUS BLD VENIPUNCTURE: CPT

## 2018-03-16 PROCEDURE — 84443 ASSAY THYROID STIM HORMONE: CPT

## 2018-03-16 PROCEDURE — 97161 PT EVAL LOW COMPLEX 20 MIN: CPT | Performed by: PHYSICAL THERAPIST

## 2018-03-16 PROCEDURE — 77030038269 HC DRN EXT URIN PURWCK BARD -A

## 2018-03-16 PROCEDURE — 84156 ASSAY OF PROTEIN URINE: CPT | Performed by: INTERNAL MEDICINE

## 2018-03-16 PROCEDURE — 74011250636 HC RX REV CODE- 250/636: Performed by: EMERGENCY MEDICINE

## 2018-03-16 PROCEDURE — 77010033678 HC OXYGEN DAILY

## 2018-03-16 PROCEDURE — 82150 ASSAY OF AMYLASE: CPT

## 2018-03-16 PROCEDURE — 74011636637 HC RX REV CODE- 636/637: Performed by: EMERGENCY MEDICINE

## 2018-03-16 PROCEDURE — 83690 ASSAY OF LIPASE: CPT

## 2018-03-16 PROCEDURE — 84100 ASSAY OF PHOSPHORUS: CPT

## 2018-03-16 PROCEDURE — G8978 MOBILITY CURRENT STATUS: HCPCS | Performed by: PHYSICAL THERAPIST

## 2018-03-16 PROCEDURE — 76770 US EXAM ABDO BACK WALL COMP: CPT

## 2018-03-16 PROCEDURE — 65660000000 HC RM CCU STEPDOWN

## 2018-03-16 PROCEDURE — 74011000258 HC RX REV CODE- 258: Performed by: INTERNAL MEDICINE

## 2018-03-16 PROCEDURE — 83036 HEMOGLOBIN GLYCOSYLATED A1C: CPT

## 2018-03-16 PROCEDURE — 84300 ASSAY OF URINE SODIUM: CPT | Performed by: INTERNAL MEDICINE

## 2018-03-16 PROCEDURE — 81001 URINALYSIS AUTO W/SCOPE: CPT | Performed by: INTERNAL MEDICINE

## 2018-03-16 RX ORDER — DEXTROSE MONOHYDRATE AND SODIUM CHLORIDE 5; .9 G/100ML; G/100ML
100 INJECTION, SOLUTION INTRAVENOUS CONTINUOUS
Status: DISPENSED | OUTPATIENT
Start: 2018-03-16 | End: 2018-03-16

## 2018-03-16 RX ORDER — HYDRALAZINE HYDROCHLORIDE 20 MG/ML
10 INJECTION INTRAMUSCULAR; INTRAVENOUS
Status: DISCONTINUED | OUTPATIENT
Start: 2018-03-16 | End: 2018-03-20 | Stop reason: HOSPADM

## 2018-03-16 RX ORDER — INSULIN LISPRO 100 [IU]/ML
INJECTION, SOLUTION INTRAVENOUS; SUBCUTANEOUS
Status: DISCONTINUED | OUTPATIENT
Start: 2018-03-16 | End: 2018-03-17

## 2018-03-16 RX ORDER — NICOTINE 7MG/24HR
1 PATCH, TRANSDERMAL 24 HOURS TRANSDERMAL
Status: DISCONTINUED | OUTPATIENT
Start: 2018-03-16 | End: 2018-03-20 | Stop reason: HOSPADM

## 2018-03-16 RX ORDER — DEXTROSE 50 % IN WATER (D50W) INTRAVENOUS SYRINGE
12.5-25 AS NEEDED
Status: DISCONTINUED | OUTPATIENT
Start: 2018-03-16 | End: 2018-03-18 | Stop reason: SDUPTHER

## 2018-03-16 RX ORDER — MAGNESIUM SULFATE 100 %
4 CRYSTALS MISCELLANEOUS AS NEEDED
Status: DISCONTINUED | OUTPATIENT
Start: 2018-03-16 | End: 2018-03-20 | Stop reason: HOSPADM

## 2018-03-16 RX ORDER — IPRATROPIUM BROMIDE AND ALBUTEROL SULFATE 2.5; .5 MG/3ML; MG/3ML
3 SOLUTION RESPIRATORY (INHALATION)
Status: DISCONTINUED | OUTPATIENT
Start: 2018-03-16 | End: 2018-03-20 | Stop reason: HOSPADM

## 2018-03-16 RX ORDER — INSULIN LISPRO 100 [IU]/ML
5 INJECTION, SOLUTION INTRAVENOUS; SUBCUTANEOUS
Status: DISCONTINUED | OUTPATIENT
Start: 2018-03-16 | End: 2018-03-18

## 2018-03-16 RX ORDER — FAMOTIDINE 20 MG/1
20 TABLET, FILM COATED ORAL
Status: DISCONTINUED | OUTPATIENT
Start: 2018-03-17 | End: 2018-03-20 | Stop reason: HOSPADM

## 2018-03-16 RX ORDER — INSULIN GLARGINE 100 [IU]/ML
8 INJECTION, SOLUTION SUBCUTANEOUS
Status: DISCONTINUED | OUTPATIENT
Start: 2018-03-16 | End: 2018-03-17

## 2018-03-16 RX ORDER — METOCLOPRAMIDE HYDROCHLORIDE 5 MG/ML
5 INJECTION INTRAMUSCULAR; INTRAVENOUS
Status: DISCONTINUED | OUTPATIENT
Start: 2018-03-16 | End: 2018-03-20 | Stop reason: HOSPADM

## 2018-03-16 RX ADMIN — INSULIN LISPRO 1 UNITS: 100 INJECTION, SOLUTION INTRAVENOUS; SUBCUTANEOUS at 22:07

## 2018-03-16 RX ADMIN — HEPARIN SODIUM 5000 UNITS: 5000 INJECTION, SOLUTION INTRAVENOUS; SUBCUTANEOUS at 04:30

## 2018-03-16 RX ADMIN — LEVOTHYROXINE SODIUM 150 MCG: 150 TABLET ORAL at 09:29

## 2018-03-16 RX ADMIN — ONDANSETRON 4 MG: 2 INJECTION INTRAMUSCULAR; INTRAVENOUS at 01:16

## 2018-03-16 RX ADMIN — SODIUM CHLORIDE 0.9 UNITS/HR: 900 INJECTION, SOLUTION INTRAVENOUS at 18:46

## 2018-03-16 RX ADMIN — INSULIN GLARGINE 8 UNITS: 100 INJECTION, SOLUTION SUBCUTANEOUS at 19:49

## 2018-03-16 RX ADMIN — Medication 10 ML: at 21:14

## 2018-03-16 RX ADMIN — ACETAMINOPHEN 650 MG: 325 TABLET ORAL at 09:29

## 2018-03-16 RX ADMIN — Medication 10 ML: at 13:48

## 2018-03-16 RX ADMIN — SODIUM CHLORIDE 2.1 UNITS/HR: 900 INJECTION, SOLUTION INTRAVENOUS at 14:52

## 2018-03-16 RX ADMIN — SODIUM CHLORIDE 1.9 UNITS/HR: 900 INJECTION, SOLUTION INTRAVENOUS at 11:26

## 2018-03-16 RX ADMIN — SODIUM CHLORIDE 100 ML/HR: 900 INJECTION, SOLUTION INTRAVENOUS at 22:53

## 2018-03-16 RX ADMIN — DEXTROSE MONOHYDRATE AND SODIUM CHLORIDE 100 ML/HR: 5; .9 INJECTION, SOLUTION INTRAVENOUS at 14:52

## 2018-03-16 RX ADMIN — HEPARIN SODIUM 5000 UNITS: 5000 INJECTION, SOLUTION INTRAVENOUS; SUBCUTANEOUS at 21:14

## 2018-03-16 RX ADMIN — Medication 10 ML: at 05:23

## 2018-03-16 RX ADMIN — AMLODIPINE BESYLATE 5 MG: 5 TABLET ORAL at 09:29

## 2018-03-16 RX ADMIN — SODIUM CHLORIDE 1.8 UNITS/HR: 900 INJECTION, SOLUTION INTRAVENOUS at 13:37

## 2018-03-16 RX ADMIN — DEXTROSE MONOHYDRATE AND SODIUM CHLORIDE 100 ML/HR: 5; .9 INJECTION, SOLUTION INTRAVENOUS at 07:50

## 2018-03-16 RX ADMIN — ASPIRIN 81 MG: 81 TABLET, COATED ORAL at 09:29

## 2018-03-16 RX ADMIN — DEXTROSE MONOHYDRATE AND SODIUM CHLORIDE 100 ML/HR: 5; .9 INJECTION, SOLUTION INTRAVENOUS at 01:15

## 2018-03-16 RX ADMIN — HEPARIN SODIUM 5000 UNITS: 5000 INJECTION, SOLUTION INTRAVENOUS; SUBCUTANEOUS at 13:48

## 2018-03-16 RX ADMIN — METOCLOPRAMIDE 5 MG: 5 INJECTION, SOLUTION INTRAMUSCULAR; INTRAVENOUS at 11:32

## 2018-03-16 NOTE — CONSULTS
Consultation Note    NAME: Donato Hanley   :  1960   MRN:  [id number]     Date/Time:  3/16/2018 2:53 PM    I have been asked to see this patient by Dr. Jyoti Huntley  for advice/opinion re: ANN on CKD 3. Assessment :    Plan:  ANN on CKD stage 3  DKA  HTN  Morbid obesity  Intractable n/v prior to admission Creatinine previously 1.4 () and is now 2.6; SHAHRZAD last year (Normal right kidney. Left kidney not adequately visualized due to obscuring bowel gas. Postvoid residual was 40 mL). [place] urine in January. Repeat urinalysis. Check FeNa. Repeat SHAHRZAD pending. I wonder if ATN from severe pre-renal insult on presentation (n/v, DKA, ARB, loop diuretic). Continue diuretic hold, arb hold and with ivf's. Subjective:   CHIEF COMPLAINT:  ann on ckd stage 3    HISTORY OF PRESENT ILLNESS:     Kiesha Garcia is a 59 y.o.   female who has a history of DM for almost 30 years, HTN and obesity. She lives on her own. Her sister takes her shopping. Not very active; on a nice day she will go for a short walk. States she was in her normal state of health until a few days before admission. Developed severe n/v. No diarrhea. She was lightheaded and was lightheaded when walking earlier today. She denies difficulty with urination. She has not seen a kidney doctor in the past. No family h/o CKD. No nsaid use prior to admission. She was on Losartan and Torsemide prior to admission. No contrast. No new rash. Sounds like she has peripheral neuropathy, but no known retinopathy. No medication changes recently.     Past Medical History:   Diagnosis Date    Arthritis     Asthma     Bronchitis     GERD (gastroesophageal reflux disease)     Hypercholesterolemia     Hypertension     Hypothyroid     IDDM (insulin dependent diabetes mellitus) (HCC)     Morbid obesity (HCC)     Peripheral neuropathy     Thyroid disease     Venous insufficiency       Past Surgical History:   Procedure Laterality Date    HX AMPUTATION      HX COLONOSCOPY  2015     Social History   Substance Use Topics    Smoking status: Current Every Day Smoker     Packs/day: 0.50     Types: Cigarettes     Last attempt to quit: 12/12/2012    Smokeless tobacco: Never Used    Alcohol use Yes      Comment: social      Family History   Problem Relation Age of Onset    COPD Mother     COPD Father       No Known Allergies   Prior to Admission medications    Medication Sig Start Date End Date Taking? Authorizing Provider   levothyroxine (SYNTHROID) 150 mcg tablet Take 150 mcg by mouth Daily (before breakfast). Yes Historical Provider   potassium chloride SR (KLOR-CON 10) 10 mEq tablet Take 10 mEq by mouth two (2) times a day. Yes Historical Provider   amLODIPine (NORVASC) 5 mg tablet Take 5 mg by mouth daily. 8/3/16  Yes Historical Provider   insulin aspart (NOVOLOG FLEXPEN) 100 unit/mL flexpen 5 Units by SubCUTAneous route Before breakfast, lunch, and dinner. Yes Historical Provider   gabapentin (NEURONTIN) 300 mg capsule Take 600 mg by mouth nightly. Yes Historical Provider   torsemide (DEMADEX) 20 mg tablet Take 40 mg by mouth two (2) times a day. Yes Historical Provider   losartan (COZAAR) 25 mg tablet Take 25 mg by mouth daily. Yes Historical Provider   insulin glargine (LANTUS) 100 unit/mL injection 8 Units by SubCUTAneous route nightly. Yes Historical Provider   ZINC PO Take 1 Tab by mouth two (2) times a day. Yes Historical Provider   aspirin delayed-release 81 mg tablet Take 81 mg by mouth daily. Yes Historical Provider   ipratropium-albuterol (COMBIVENT RESPIMAT)  mcg/actuation inhaler Take 1 Puff by inhalation every six (6) hours as needed for Shortness of Breath.     Historical Provider     REVIEW OF SYSTEMS:     []  Unable to obtain reliable ROS due to  [] mental status  [] sedated   [] intubated   [x] Total of 12 systems reviewed as follows:  Constitutional: negative fever, negative chills, negative weight loss  Eyes:   negative visual changes  ENT:   negative sore throat, tongue or lip swelling  Respiratory:  negative cough, negative dyspnea  Cards:  negative for chest pain, palpitations, lower extremity edema  GI:   negative for nausea, vomiting, diarrhea, and abdominal pain  :  negative for frequency, dysuria  Integument:  negative for rash and pruritus  Heme:  negative for easy bruising and gum/nose bleeding  Musculoskel: negative for myalgias,  back pain; + generalized muscle weakness  Neuro:  negative for headaches,  vertigo  Psych:  negative for feelings of anxiety, depression   Travel?: none    Objective:   VITALS:    Visit Vitals    /72 (BP Patient Position: Sitting;Post activity)    Pulse 94    Temp 98.2 °F (36.8 °C)    Resp 18    Ht 5' 1\" (1.549 m)    Wt 105.7 kg (233 lb)    SpO2 97%    BMI 44.02 kg/m2     PHYSICAL EXAM:  Gen:  []  WD []  WN  [] cachectic []  thin [x]  obese []  disheveled             [x]  ill apearing  []   Critical  [x]   Chronic    [x]  No acute distress    HEENT:   [x] NC/AT/PERRLA/EOMI    [] pink conjunctivae      [] pale conjunctivae                  PERRL  [] yes  [] no      [] moist mucosa    [] dry mucosa    hearing intact to voice [x] yes  [] No                 NECK:   supple [x] yes  [] no        masses [] yes  [] No               thyroid  []  non tender  []  tender    RESP:   [x] CTA bilaterally/no wheezing/rhonchi/rales/crackles    [] rhonchi bilaterally - no dullness  [] wheezing   [] rhonchi   [] crackles     use of accessory muscles [] yes [x] no    CARD:   [x]  regular rate and rhythm/No murmurs/rubs/gallops    murmur  [] yes ()  [] no      Rubs  [] yes  [] no       Gallops [] yes  [] no    Rate []  regular  []  irregular        carotid bruits  [] Right  []  Left                 LE edema [] yes  [x] no           JVP  []  yes   [x]  no    ABD:    [x] soft/non distended/non tender/+bowel sounds/no HSM    []  Rigid    tenderness [] yes [] no   Liver enlargement  []  yes []  no Spleen enlargement  []  yes []  no     distended []  yes [] no     bowel sound  [] hypoactive   [] hyperactive    LYMPH:    Neck []  yes [x]  no       Axillae []  yes []  no    SKIN:   bruising [x]  yes   []  no    Ulcers []  yes   []  no               [] tight to palpitation    skin turgor []  good  [] poor  [x] decreased               Cyanosis/clubbing []  yes []  no    NEUR:   [x] cranial nerves II-XII grossly intact       [] Cranial nerves deficit                 []  facial droop    []  slurred speech   [] aphasic     [] Strength normal     []  weakness  []  LUE  []   RUE/ []  LLE  []   RLE    follows commands  [x]  yes []  no           PSYCH:   insight [] poor [] good   Alert and Oriented to  [x] person  [x] place  [x]  time                    [] depressed [] anxious [] agitated  [] lethargic [] stuporous  [] sedated     LAB DATA REVIEWED:    Recent Labs      03/16/18   0136  03/15/18   1045   WBC  13.3*  14.0*   HGB  12.6  14.1   HCT  41.3  46.9   PLT  245  298     Recent Labs      03/16/18   0950  03/16/18   0459  03/16/18   0136   03/15/18   1045   NA  137  133*  135*   < >  133*   K  3.6  4.5  4.4   < >  4.4   CL  102  100  98   < >  87*   CO2  28  19*  21   < >  10*   BUN  46*  47*  48*   < >  41*   CREA  2.63*  2.64*  2.76*   < >  2.49*   GLU  158*  286*  260*   < >  680*   CA  8.0*  7.7*  7.7*   < >  9.0   MG   --    --   1.6   --   1.8   PHOS   --    --   3.7   --    --     < > = values in this interval not displayed. Recent Labs      03/16/18   0136  03/15/18   1725  03/15/18   1045   SGOT  8*  9*  6*   ALT  11*  11*  15   AP  136*  142*  165*   TBILI  0.8  0.6  0.8   ALB  3.6  3.6  4.0   GLOB  2.6  3.1  3.7   AML  155*   --    --    LPSE  1205*   --    --      No results for input(s): INR, PTP, APTT in the last 72 hours. No lab exists for component: INREXT   No results for input(s): FE, TIBC, PSAT, FERR in the last 72 hours. No results for input(s): PH, PCO2, PO2 in the last 72 hours.   No results for input(s): CPK, CKMB in the last 72 hours. No lab exists for component: TROPONINI  Lab Results   Component Value Date/Time    Glucose (POC) 165 (H) 03/16/2018 02:50 PM    Glucose (POC) 150 (H) 03/16/2018 01:35 PM    Glucose (POC) 125 (H) 03/16/2018 12:14 PM    Glucose (POC) 124 (H) 03/16/2018 11:10 AM    Glucose (POC) 154 (H) 03/16/2018 09:20 AM       Procedures: see electronic medical records for all procedures/Xrays and details which were not copied into this note but were reviewed prior to creation of Plan.    ________________________________________________________________________       ___________________________________________________  Consulting Physician:  Eric Brown MD

## 2018-03-16 NOTE — WOUND CARE
Wound care nurse has attempted to see this patient several time today but she was in with other disciplines all day. Pt. Is up in the chair talking with family and the diabetes educator nurse on this last attempt. We were asked to look at her legs because the \"VCS wraps were removed today\". No wounds have been documented in the wound LDA flowsheet at all and none in the simple assessment so it is unclear if there are any wounds and the nurse is not currently available to speak to. Plan: Will see her Monday.

## 2018-03-16 NOTE — PROGRESS NOTES
Physical Therapy Goals  Initiated 3/16/2018  1. Patient will move from supine to sit and sit to supine , scoot up and down and roll side to side in bed with supervision/set-up within 7 day(s). 2.  Patient will transfer from bed to chair and chair to bed with supervision/set-up using the least restrictive device within 7 day(s). 3.  Patient will perform sit to stand with supervision/set-up within 7 day(s). 4.  Patient will ambulate with supervision/set-up for 200 feet with the least restrictive device within 7 day(s). 5.  Patient will ascend/descend 5 stairs with 1 handrail(s) with supervision/set-up within 7 day(s). physical Therapy EVALUATION  Patient: Elizabeth Flores (77 y.o. female)  Date: 3/16/2018  Primary Diagnosis: DKA (diabetic ketoacidoses) ([facility] [address]  ANN (acute kidney injury) ([facility] [address]        Precautions: falls      ASSESSMENT :  Based on the objective data described below, the patient presents with generalized weakness and impaired functional mobility, balance, endurance and activity tolerance. Patient requiring CGA for transfers and ambulation. Gait is steady overall but demonstrates decreased step clearance and increased trunk sway. O2 sats decreased to 87% on RA during ambulation and patient with c/o dizziness- improved with pursed lip breathing   Patient lives alone and has a caregiver M-F from 8-1. She reports modified independence with all mobility but admits to frequent falls. Sister reports increasing sedentary lifestyle recently. Patient would benefit from SNF following discharge to improve overall functional independence prior to returning to home. Patient will benefit from skilled intervention to address the above impairments.   Patients rehabilitation potential is considered to be Fair  Factors which may influence rehabilitation potential include:   []         None noted  []         Mental ability/status  []         Medical condition  []         Home/family situation and support systems  []         Safety awareness  []         Pain tolerance/management  [x]         Other: willingness to participate in therapy     PLAN :  Recommendations and Planned Interventions:  [x]           Bed Mobility Training             []    Neuromuscular Re-Education  [x]           Transfer Training                   []    Orthotic/Prosthetic Training  [x]           Gait Training                         []    Modalities  [x]           Therapeutic Exercises           []    Edema Management/Control  []           Therapeutic Activities            [x]    Patient and Family Training/Education  [x]           Other (comment):stairs    Frequency/Duration: Patient will be followed by physical therapy  4 times a week to address goals. Discharge Recommendations: [facility]as[facility]  Further Equipment Recommendations for Discharge: TBD by SNF     SUBJECTIVE:   Patient stated I'm dizzy. - stated during ambulation    OBJECTIVE DATA SUMMARY:   HISTORY:    Past Medical History:   Diagnosis Date    Arthritis     Asthma     Bronchitis     GERD (gastroesophageal reflux disease)     Hypercholesterolemia     Hypertension     Hypothyroid     IDDM (insulin dependent diabetes mellitus) ([facility] [address]     Morbid obesity ([facility] [address]     Peripheral neuropathy     Thyroid disease     Venous insufficiency      Past Surgical History:   Procedure Laterality Date    HX AMPUTATION      HX COLONOSCOPY  2015     Prior Level of Function/Home Situation: patient reports independence with all mobility; uses RW for ambulation; has caregiver from -, M-F      Home Situation  Home Environment: Apartment  # Steps to Enter: 0  One/Two Story Residence: One story  # of Interior Steps:  (not very sure of number)  Living Alone: Yes  Support Systems: Family member(s), Home care staff (M-F 8:00-13:00)  Patient Expects to be Discharged to[de-identified] Apartment  Current DME Used/Available at Home: Walker, rolling, Grab bars, Shower chair  Tub or Shower Type: Shower    EXAMINATION/PRESENTATION/DECISION MAKING:   Critical Behavior:  Neurologic State: Alert  Orientation Level: Oriented X4  Cognition: Follows commands, Decreased attention/concentration     Hearing: Auditory  Auditory Impairment: None    Range Of Motion:  AROM: Generally decreased, functional                       Strength:    Strength: Generally decreased, functional                    Tone & Sensation:   Tone: Normal                              Coordination:  Coordination: Generally decreased, functional  Vision:      Functional Mobility:  Bed Mobility:      deferred; upon arrival patient sitting in chair        Transfers:  Sit to Stand: Contact guard assistance  Stand to Sit: Contact guard assistance                       Balance:   Sitting: Intact  Standing: Impaired  Standing - Static: Good;Constant support  Standing - Dynamic : Fair (using RW for support)  Ambulation/Gait Training:  Distance (ft): 100 Feet (ft)  Assistive Device: Walker, rolling  Ambulation - Level of Assistance: Contact guard assistance        Gait Abnormalities: Decreased step clearance;Trunk sway increased        Base of Support: Widened     Speed/Diana: Pace decreased (<100 feet/min); Slow  Step Length: Left shortened;Right shortened      Gait is slow with increased trunk sway, but steady overall with no LOB noted          Functional Measure:    Elder Mobility Scale    10/20         EMS and G-code impairment scale:  Percentage of impairment CH  0% CI  1-19% CJ  20-39% CK  40-59% CL  60-79% CM  80-99% CN  100%   EMS Score 0-20 20 17-19 13-16 9-12 5-8 1-4 0      Scores under 10  generally these patients are dependent in mobility maneuvers; require help with  basic ADL, such as transfers, toileting and dressing. Scores between 10  13  generally these patients are borderline in terms of safe mobility and  independence in ADL i.e. they require some help with some mobility maneuvers.     Scores over 14  Generally these patients are able to perform mobility maneuvers alone and safely  and are independent in basic ADL. G codes: In compliance with CMSs Claims Based Outcome Reporting, the following G-code set was chosen for this patient based on their primary functional limitation being treated: The outcome measure chosen to determine the severity of the functional limitation was the [facility] Mobility scale with a score of 10/20 which was correlated with the impairment scale. ? Mobility - Walking and Moving Around:     - CURRENT STATUS: CK - 40%-59% impaired, limited or restricted    - GOAL STATUS: CI - 1%-19% impaired, limited or restricted    - D/C STATUS:  ---------------To be determined---------------          Pain:  Pain Scale 1: Numeric (0 - 10)  Pain Intensity 1: 0              Activity Tolerance:   Pulse Oximetry Assessment    97% at rest on room air  87% while ambulating on room air  100% at rest on 2LPM    Please refer to the flowsheet for vital signs taken during this treatment. After treatment:   [x]         Patient left in no apparent distress sitting up in chair  []         Patient left in no apparent distress in bed  [x]         Call bell left within reach  [x]         Nursing notified  [x]         Caregiver present  []         Bed alarm activated    COMMUNICATION/EDUCATION:   The patients plan of care was discussed with: Registered Nurse,  and Rehabilitation Attendant. [x]         Fall prevention education was provided and the patient/caregiver indicated understanding. [x]         Patient/family have participated as able in goal setting and plan of care. [x]         Patient/family agree to work toward stated goals and plan of care. [x]         Patient understands intent and goals of therapy, but is neutral about his/her participation. []         Patient is unable to participate in goal setting and plan of care.     Thank you for this referral.  Ken Nichole, PT   Time Calculation: 27 mins

## 2018-03-16 NOTE — CONSULTS
PSYCHIATRIC CONSULTATION                         IDENTIFICATION:    Patient Name  Estiven Cuevas   Date of Birth 1960   [facility] [id number]   Medical Record Number  [id number]      Age  59 y.o. PCP Nick Jerome MD   Admit date:  3/15/2018    Room Number  2240/01  @ [facility]   Date of Service  3/16/2018            HISTORY         REASON FOR HOSPITALIZATION/CONSULTATION:  A psychiatric consultation was requested by Kishan Miner MD to evaluate or provide advice/opinion related to evaluating for capacity   CC: \"nausea \"    HISTORY OF PRESENT ILLNESS:    The patient, Estiven Cuevas, is a 59 y.o. 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  who was admitted to the medical floor for the treatment of DKA, she stated she missed her insuline dosages several times before coming to the hospital and she stated she is living by herself and she called her mother stated she is throwing up and her mother directed her to press her bracelet button so she can get the help , patient stated she did not come here before and this is her first time ,she used to be in special education class at school and did not maintain a job , she lives by herself and she has denied self harm behavior and denied prior psychiatrist hospitalization but her cognitive function is limited . Pt seen today for evaluation. ALLERGIES:  No Known Allergies   MEDICATIONS PRIOR TO ADMISSION:  Prescriptions Prior to Admission   Medication Sig    levothyroxine (SYNTHROID) 150 mcg tablet Take 150 mcg by mouth Daily (before breakfast).  potassium chloride SR (KLOR-CON 10) 10 mEq tablet Take 10 mEq by mouth two (2) times a day.  amLODIPine (NORVASC) 5 mg tablet Take 5 mg by mouth daily.  insulin aspart (NOVOLOG FLEXPEN) 100 unit/mL flexpen 5 Units by SubCUTAneous route Before breakfast, lunch, and dinner.     gabapentin (NEURONTIN) 300 mg capsule Take 600 mg by mouth nightly.  torsemide (DEMADEX) 20 mg tablet Take 40 mg by mouth two (2) times a day.  losartan (COZAAR) 25 mg tablet Take 25 mg by mouth daily.  insulin glargine (LANTUS) 100 unit/mL injection 8 Units by SubCUTAneous route nightly.  ZINC PO Take 1 Tab by mouth two (2) times a day.  aspirin delayed-release 81 mg tablet Take 81 mg by mouth daily.  ipratropium-albuterol (COMBIVENT RESPIMAT)  mcg/actuation inhaler Take 1 Puff by inhalation every six (6) hours as needed for Shortness of Breath. PAST MEDICAL HISTORY:  Past Medical History:   Diagnosis Date    Arthritis     Asthma     Bronchitis     GERD (gastroesophageal reflux disease)     Hypercholesterolemia     Hypertension     Hypothyroid     IDDM (insulin dependent diabetes mellitus) (HCC)     Morbid obesity (HCC)     Peripheral neuropathy     Thyroid disease     Venous insufficiency      Past Surgical History:   Procedure Laterality Date    HX AMPUTATION      HX COLONOSCOPY  2015      SOCIAL HISTORY: single   Social History     Social History    Marital status: SINGLE     Spouse name: N/A    Number of children: N/A    Years of education: N/A     Occupational History    Not on file. Social History Main Topics    Smoking status: Current Every Day Smoker     Packs/day: 0.50     Types: Cigarettes     Last attempt to quit: 12/12/2012    Smokeless tobacco: Never Used    Alcohol use Yes      Comment: social    Drug use: No    Sexual activity: Not on file     Other Topics Concern    Not on file     Social History Narrative      FAMILY HISTORY: History reviewed. No pertinent family history. Family History   Problem Relation Age of Onset   Chon Lace COPD Mother     COPD Father        REVIEW of SYSTEMS:   History obtained from chart review and the patient  Pertinent items are noted in the History of Present Illness. All other Systems reviewed and are considered negative.            MENTAL STATUS EXAM & VITALS       MENTAL STATUS EXAM (MSE):    MSE FINDINGS ARE WITHIN NORMAL LIMITS (WNL) UNLESS OTHERWISE STATED BELOW. Orientation oriented to time, place and person   Vital Signs (BP,Pulse, Temp) See below (reviewed 3/16/2018); vital signs are WNL if not listed below.    Gait and Station Stable, no ataxia   Abnormal Muscular Movements/Tone/Behavior No EPS, no Tardive Dyskinesia, no abnormal muscular movements; wnl tone   Relations cooperative   General Appearance:  age appropriate   Language No aphasia or dysarthria   Speech:  normal pitch and normal volume   Thought Processes logical, wnl rate of thoughts, good abstract reasoning and computation   Thought Associations within normal limits   Thought Content free of delusions   Suicidal Ideations no plan  and no intention   Homicidal Ideations no plan  and no intention   Mood:  anxious   Affect:  constricted   Memory recent  impaired   Memory remote:  impaired   Concentration/Attention:  adequate   Fund of Knowledge Below average   Insight:  limited   Reliability fair   Judgment:  limited            VITALS:     Patient Vitals for the past 24 hrs:   Temp Pulse Resp BP SpO2   03/16/18 1531 98 °F (36.7 °C) 63 19 155/73 100 %   03/16/18 1409 - 94 - 146/72 97 %   03/16/18 1113 98.2 °F (36.8 °C) 65 18 113/52 99 %   03/16/18 0716 98.8 °F (37.1 °C) 78 17 107/58 97 %   03/16/18 0343 98.7 °F (37.1 °C) 75 14 148/56 95 %   03/15/18 2207 98.3 °F (36.8 °C) 74 16 151/58 99 %   03/15/18 2130 - 74 15 - 96 %   03/15/18 2115 - 72 15 138/52 96 %   03/15/18 2100 - 73 16 156/56 97 %   03/15/18 2045 - 77 21 147/54 98 %   03/15/18 2000 - 72 - 137/45 97 %   03/15/18 1930 - - - 128/57 98 %   03/15/18 1900 - 66 - 127/42 97 %   03/15/18 1845 - 69 - 124/48 97 %   03/15/18 1830 - 71 - 135/62 97 %              DATA     LABORATORY DATA:  Labs Reviewed   HEMOGLOBIN A1C WITH EAG - Abnormal; Notable for the following:        Result Value    Hemoglobin A1c 8.2 (*)     All other components within normal limits   METABOLIC PANEL, COMPREHENSIVE - Abnormal; Notable for the following:     Sodium 133 (*)     Chloride 94 (*)     CO2 19 (*)     Anion gap 20 (*)     Glucose 324 (*)     BUN 43 (*)     Creatinine 2.48 (*)     GFR est AA 24 (*)     GFR est non-AA 20 (*)     Calcium 7.7 (*)     ALT (SGPT) 11 (*)     AST (SGOT) 9 (*)     Alk. phosphatase 142 (*)     All other components within normal limits   METABOLIC PANEL, BASIC - Abnormal; Notable for the following:     Sodium 135 (*)     Anion gap 16 (*)     Glucose 260 (*)     BUN 48 (*)     Creatinine 2.76 (*)     GFR est AA 21 (*)     GFR est non-AA 18 (*)     Calcium 7.7 (*)     All other components within normal limits   LIPASE - Abnormal; Notable for the following:     Lipase 1205 (*)     All other components within normal limits   AMYLASE - Abnormal; Notable for the following:     Amylase 155 (*)     All other components within normal limits   HEMOGLOBIN A1C WITH EAG - Abnormal; Notable for the following:     Hemoglobin A1c 8.0 (*)     All other components within normal limits   CBC WITH AUTOMATED DIFF - Abnormal; Notable for the following:     WBC 13.3 (*)     MCV 79.4 (*)     MCH 24.2 (*)     RDW 15.0 (*)     NEUTROPHILS 86 (*)     LYMPHOCYTES 7 (*)     ABS. NEUTROPHILS 11.4 (*)     ABS. IMM. GRANS. 0.1 (*)     All other components within normal limits   HEPATIC FUNCTION PANEL - Abnormal; Notable for the following:     Protein, total 6.2 (*)     Alk.  phosphatase 136 (*)     AST (SGOT) 8 (*)     ALT (SGPT) 11 (*)     All other components within normal limits   METABOLIC PANEL, BASIC - Abnormal; Notable for the following:     Sodium 133 (*)     CO2 19 (*)     Glucose 286 (*)     BUN 47 (*)     Creatinine 2.64 (*)     GFR est AA 22 (*)     GFR est non-AA 19 (*)     Calcium 7.7 (*)     All other components within normal limits   METABOLIC PANEL, BASIC - Abnormal; Notable for the following:     Glucose 158 (*)     BUN 46 (*)     Creatinine 2.63 (*)     GFR est AA 23 (*)     GFR est non-AA 19 (*)     Calcium 8.0 (*)     All other components within normal limits   METABOLIC PANEL, BASIC - Abnormal; Notable for the following:     Sodium 135 (*)     Glucose 159 (*)     BUN 44 (*)     Creatinine 2.64 (*)     GFR est AA 22 (*)     GFR est non-AA 19 (*)     Calcium 8.4 (*)     All other components within normal limits   GLUCOSE, POC - Abnormal; Notable for the following:     Glucose (POC) 341 (*)     All other components within normal limits   GLUCOSE, POC - Abnormal; Notable for the following:     Glucose (POC) 285 (*)     All other components within normal limits   GLUCOSE, POC - Abnormal; Notable for the following:     Glucose (POC) 271 (*)     All other components within normal limits   GLUCOSE, POC - Abnormal; Notable for the following:     Glucose (POC) 208 (*)     All other components within normal limits   GLUCOSE, POC - Abnormal; Notable for the following:     Glucose (POC) 183 (*)     All other components within normal limits   GLUCOSE, POC - Abnormal; Notable for the following:     Glucose (POC) 185 (*)     All other components within normal limits   GLUCOSE, POC - Abnormal; Notable for the following:     Glucose (POC) 200 (*)     All other components within normal limits   GLUCOSE, POC - Abnormal; Notable for the following:     Glucose (POC) 220 (*)     All other components within normal limits   GLUCOSE, POC - Abnormal; Notable for the following:     Glucose (POC) 267 (*)     All other components within normal limits   GLUCOSE, POC - Abnormal; Notable for the following:     Glucose (POC) 254 (*)     All other components within normal limits   GLUCOSE, POC - Abnormal; Notable for the following:     Glucose (POC) 257 (*)     All other components within normal limits   GLUCOSE, POC - Abnormal; Notable for the following:     Glucose (POC) 257 (*)     All other components within normal limits   GLUCOSE, POC - Abnormal; Notable for the following: Glucose (POC) 190 (*)     All other components within normal limits   GLUCOSE, POC - Abnormal; Notable for the following:     Glucose (POC) 154 (*)     All other components within normal limits   GLUCOSE, POC - Abnormal; Notable for the following:     Glucose (POC) 124 (*)     All other components within normal limits   GLUCOSE, POC - Abnormal; Notable for the following:     Glucose (POC) 125 (*)     All other components within normal limits   GLUCOSE, POC - Abnormal; Notable for the following:     Glucose (POC) 150 (*)     All other components within normal limits   GLUCOSE, POC - Abnormal; Notable for the following:     Glucose (POC) 165 (*)     All other components within normal limits   GLUCOSE, POC - Abnormal; Notable for the following:     Glucose (POC) 167 (*)     All other components within normal limits   GLUCOSTABILIZER   GLUCOSTABILIZER   GLUCOSTABILIZER   GLUCOSTABILIZER   GLUCOSTABILIZER   MAGNESIUM   PHOSPHORUS   TSH 3RD GENERATION   GLUCOSTABILIZER   GLUCOSTABILIZER   GLUCOSTABILIZER   GLUCOSTABILIZER   GLUCOSTABILIZER   GLUCOSTABILIZER   GLUCOSTABILIZER   GLUCOSTABILIZER   GLUCOSTABILIZER   GLUCOSTABILIZER   GLUCOSTABILIZER   GLUCOSTABILIZER   GLUCOSTABILIZER   URINALYSIS W/MICROSCOPIC   PROTEIN/CREATININE RATIO, URINE   SODIUM, UR, RANDOM   CREATININE, UR, RANDOM     Admission on 03/15/2018   No results displayed because visit has over 200 results.       Admission on 03/15/2018, Discharged on 03/15/2018   Component Date Value Ref Range Status    Glucose (POC) 03/15/2018 >600* 65 - 100 mg/dL Final    Performed by 03/15/2018 [id number]    Final    WBC 03/15/2018 14.0* 3.6 - 11.0 K/uL Final    RBC 03/15/2018 5.74* 3.80 - 5.20 M/uL Final    HGB 03/15/2018 14.1  11.5 - 16.0 g/dL Final    HCT 03/15/2018 46.9  35.0 - 47.0 % Final    MCV 03/15/2018 81.7  80.0 - 99.0 FL Final    MCH 03/15/2018 24.6* 26.0 - 34.0 PG Final    MCHC 03/15/2018 30.1  30.0 - 36.5 g/dL Final    RDW 03/15/2018 14.6* 11.5 - 14.5 % Final    PLATELET 63/09/4666 231  150 - 400 K/uL Final    MPV 03/15/2018 11.0  8.9 - 12.9 FL Final    NRBC 03/15/2018 0.0  0  WBC Final    ABSOLUTE NRBC 03/15/2018 0.00  0.00 - 0.01 K/uL Final    NEUTROPHILS 03/15/2018 91* 32 - 75 % Final    LYMPHOCYTES 03/15/2018 4* 12 - 49 % Final    MONOCYTES 03/15/2018 4* 5 - 13 % Final    EOSINOPHILS 03/15/2018 0  0 - 7 % Final    BASOPHILS 03/15/2018 0  0 - 1 % Final    IMMATURE GRANULOCYTES 03/15/2018 1* 0.0 - 0.5 % Final    ABS. NEUTROPHILS 03/15/2018 12.7* 1.8 - 8.0 K/UL Final    ABS. LYMPHOCYTES 03/15/2018 0.6* 0.8 - 3.5 K/UL Final    ABS. MONOCYTES 03/15/2018 0.6  0.0 - 1.0 K/UL Final    ABS. EOSINOPHILS 03/15/2018 0.0  0.0 - 0.4 K/UL Final    ABS. BASOPHILS 03/15/2018 0.0  0.0 - 0.1 K/UL Final    ABS. IMM. GRANS. 03/15/2018 0.1* 0.00 - 0.04 K/UL Final    DF 03/15/2018 SMEAR SCANNED    Final    RBC COMMENTS 03/15/2018 NORMOCYTIC, NORMOCHROMIC    Final    Sodium 03/15/2018 133* 136 - 145 mmol/L Final    Potassium 03/15/2018 4.4  3.5 - 5.1 mmol/L Final    Chloride 03/15/2018 87* 97 - 108 mmol/L Final    CO2 03/15/2018 10* 21 - 32 mmol/L Final    Anion gap 03/15/2018 36* 5 - 15 mmol/L Final    Glucose 03/15/2018 680* 65 - 100 mg/dL Final    BUN 03/15/2018 41* 7.0 - 18.0 MG/DL Final    Creatinine 03/15/2018 2.49* 0.55 - 1.02 MG/DL Final    BUN/Creatinine ratio 03/15/2018 16  12 - 20   Final    GFR est AA 03/15/2018 24* >60 ml/min/1.73m2 Final    GFR est non-AA 03/15/2018 20* >60 ml/min/1.73m2 Final    Calcium 03/15/2018 9.0  8.5 - 10.1 MG/DL Final    Bilirubin, total 03/15/2018 0.8  0.2 - 1.0 MG/DL Final    ALT (SGPT) 03/15/2018 15  14 - 63 U/L Final    AST (SGOT) 03/15/2018 6* 15 - 37 U/L Final    Alk.  phosphatase 03/15/2018 165* 45 - 117 U/L Final    Protein, total 03/15/2018 7.7  6.4 - 8.2 g/dL Final    Albumin 03/15/2018 4.0  3.5 - 5.0 g/dL Final    Globulin 03/15/2018 3.7  2.0 - 4.0 g/dL Final    A-G Ratio 03/15/2018 1.1  1.1 - 2.2   Final    TSH 03/15/2018 2.45  0.34 - 4.82 uIU/mL Final    Magnesium 03/15/2018 1.8  1.6 - 2.4 mg/dL Final    Color 03/15/2018 YELLOW/STRAW    Final    Appearance 03/15/2018 CLEAR  CLEAR   Final    Specific gravity 03/15/2018 1.025  1.003 - 1.030   Final    pH (UA) 03/15/2018 5.5  5.0 - 8.0   Final    Protein 03/15/2018 30* NEG mg/dL Final    Glucose 03/15/2018 500* NEG mg/dL Final    Ketone 03/15/2018 80* NEG mg/dL Final    Blood 03/15/2018 TRACE* NEG   Final    Urobilinogen 03/15/2018 0.2  0.2 - 1.0 EU/dL Final    Nitrites 03/15/2018 NEGATIVE   NEG   Final    Leukocyte Esterase 03/15/2018 NEGATIVE   NEG   Final    Bilirubin UA, confirm 03/15/2018 NEGATIVE   NEG   Final    WBC 03/15/2018 0-4  0 - 4 /hpf Final    RBC 03/15/2018 0-5  0 - 5 /hpf Final    Epithelial cells 03/15/2018 FEW  FEW /lpf Final    Bacteria 03/15/2018 NEGATIVE   NEG /hpf Final    Glucose (POC) 03/15/2018 483* 65 - 100 mg/dL Final    Performed by 03/15/2018 [id number]    Final    Acetone/Ketone serum, QL. 03/15/2018 POSITIVE* NEG      Final    Ventricular Rate 03/15/2018 80  BPM Preliminary    Atrial Rate 03/15/2018 80  BPM Preliminary    P-R Interval 03/15/2018 150  ms Preliminary    QRS Duration 03/15/2018 130  ms Preliminary    Q-T Interval 03/15/2018 436  ms Preliminary    QTC Calculation (Bezet) 03/15/2018 502  ms Preliminary    Calculated P Axis 03/15/2018 72  degrees Preliminary    Calculated R Axis 03/15/2018 98  degrees Preliminary    Calculated T Axis 03/15/2018 37  degrees Preliminary    Diagnosis 03/15/2018    Preliminary                    Value:Normal sinus rhythm  Possible Left atrial enlargement  Right bundle branch block  Abnormal ECG  When compared with ECG of 24-JAN-2018 06:47,  premature ventricular complexes are no longer present  Right bundle branch block has replaced Incomplete right bundle branch block      Glucose (POC) 03/15/2018 401* 65 - 100 mg/dL Final    Performed by 03/15/2018 [id number] Final    FIO2 (POC) 03/15/2018 28  % Final    pH (POC) 03/15/2018 7.119* 7.35 - 7.45   Final    pCO2 (POC) 03/15/2018 36.0  35.0 - 45.0 MMHG Final    pO2 (POC) 03/15/2018 82  80 - 100 MMHG Final    HCO3 (POC) 03/15/2018 11.7* 22 - 26 MMOL/L Final    sO2 (POC) 03/15/2018 91* 92 - 97 % Final    Base deficit (POC) 03/15/2018 18  mmol/L Final    Site 03/15/2018 LEFT RADIAL    Final    Device: 03/15/2018 NASAL CANNULA    Final    Flow rate (POC) 03/15/2018 2  L/M Final    Allens test (POC) 03/15/2018 YES    Final    Specimen type (POC) 03/15/2018 ARTERIAL    Final    Glucose (POC) 03/15/2018 367* 65 - 100 mg/dL Final    Performed by 03/15/2018 [id number]    Final        RADIOLOGY REPORTS:    Results from Hospital Encounter encounter on 03/15/18   XR CHEST PORT   Narrative CHEST, FRONTAL VIEW    INDICATION: Hyperglycemia. Weakness. COMPARISON: None. FINDINGS:     The heart and mediastinal structures are normal.  There is no acute airspace  consolidation, pleural fluid or pneumothorax. Pulmonary vascularity is normal.  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 No acute osseous findings. Impression IMPRESSION:      No acute cardiopulmonary process. Xr Chest Port    Result Date: 3/15/2018  CHEST, FRONTAL VIEW INDICATION: Hyperglycemia. Weakness. COMPARISON: None. FINDINGS: The heart and mediastinal structures are normal.  There is no acute airspace consolidation, pleural fluid or pneumothorax. Pulmonary vascularity is normal. 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 No acute osseous findings. IMPRESSION:  No acute cardiopulmonary process.               MEDICATIONS       ALL MEDICATIONS  Current Facility-Administered Medications   Medication Dose Route Frequency    dextrose 5% and 0.9% NaCl infusion  100 mL/hr IntraVENous CONTINUOUS    albuterol-ipratropium (DUO-NEB) 2.5 MG-0.5 MG/3 ML  3 mL Nebulization Q6H PRN    [START ON 3/17/2018] famotidine (PEPCID) tablet 20 mg  20 mg Oral ACB    insulin lispro (HUMALOG) injection   SubCUTAneous AC&HS    glucose chewable tablet 16 g  4 Tab Oral PRN    dextrose (D50W) injection syrg 12.5-25 g  12.5-25 g IntraVENous PRN    glucagon (GLUCAGEN) injection 1 mg  1 mg IntraMUSCular PRN    insulin glargine (LANTUS) injection 8 Units  8 Units SubCUTAneous QHS    insulin lispro (HUMALOG) injection 5 Units  5 Units SubCUTAneous TIDAC    metoclopramide HCl (REGLAN) injection 5 mg  5 mg IntraVENous Q6H PRN    hydrALAZINE (APRESOLINE) 20 mg/mL injection 10 mg  10 mg IntraVENous Q6H PRN    nicotine (NICODERM CQ) 7 mg/24 hr patch 1 Patch  1 Patch TransDERmal DAILY PRN    insulin regular (NOVOLIN R, HUMULIN R) 100 Units in 0.9% sodium chloride 100 mL infusion  0-50 Units/hr IntraVENous TITRATE    glucose chewable tablet 16 g  4 Tab Oral PRN    dextrose (D50W) injection syrg 12.5-25 g  12.5-25 g IntraVENous PRN    glucagon (GLUCAGEN) injection 1 mg  1 mg IntraMUSCular PRN    levothyroxine (SYNTHROID) tablet 150 mcg  150 mcg Oral ACB    amLODIPine (NORVASC) tablet 5 mg  5 mg Oral DAILY    aspirin delayed-release tablet 81 mg  81 mg Oral DAILY    sodium chloride (NS) flush 5-10 mL  5-10 mL IntraVENous Q8H    sodium chloride (NS) flush 5-10 mL  5-10 mL IntraVENous PRN    acetaminophen (TYLENOL) tablet 650 mg  650 mg Oral Q4H PRN    HYDROcodone-acetaminophen (NORCO) 5-325 mg per tablet 1 Tab  1 Tab Oral Q4H PRN    naloxone (NARCAN) injection 0.4 mg  0.4 mg IntraVENous PRN    diphenhydrAMINE (BENADRYL) capsule 25 mg  25 mg Oral Q4H PRN    ondansetron (ZOFRAN) injection 4 mg  4 mg IntraVENous Q4H PRN    heparin (porcine) injection 5,000 Units  5,000 Units SubCUTAneous Q8H    0.9% sodium chloride infusion  100 mL/hr IntraVENous CONTINUOUS      SCHEDULED MEDICATIONS  Current Facility-Administered Medications   Medication Dose Route Frequency    dextrose 5% and 0.9% NaCl infusion  100 mL/hr IntraVENous CONTINUOUS    [START ON 3/17/2018] famotidine (PEPCID) tablet 20 mg  20 mg Oral ACB    insulin lispro (HUMALOG) injection   SubCUTAneous AC&HS    insulin glargine (LANTUS) injection 8 Units  8 Units SubCUTAneous QHS    insulin lispro (HUMALOG) injection 5 Units  5 Units SubCUTAneous TIDAC    insulin regular (NOVOLIN R, HUMULIN R) 100 Units in 0.9% sodium chloride 100 mL infusion  0-50 Units/hr IntraVENous TITRATE    levothyroxine (SYNTHROID) tablet 150 mcg  150 mcg Oral ACB    amLODIPine (NORVASC) tablet 5 mg  5 mg Oral DAILY    aspirin delayed-release tablet 81 mg  81 mg Oral DAILY    sodium chloride (NS) flush 5-10 mL  5-10 mL IntraVENous Q8H    heparin (porcine) injection 5,000 Units  5,000 Units SubCUTAneous Q8H    0.9% sodium chloride infusion  100 mL/hr IntraVENous CONTINUOUS                ASSESSMENT & PLAN        The patient Abiodun Tapia is a 59 y.o.  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 I will continue to follow up with patient as deemed appropriate. I have reviewed admission (and previous/old) labs and medical tests in the EHR and or transferring hospital documents. I will continue to order blood tests/labs and diagnostic tests as deemed appropriate and review results as they become available (see orders for details). I have reviewed old psychiatric and medical records available in the EHR. I Will order additional psychiatric records from other institutions to further elucidate the nature of patient's psychopathology and review once available.     I will gather additional collateral information from friends, family and o/p treatment team to further elucidate the nature of patient's psychopathology and baselline level of psychiatric functioning.                      SIGNED:    Melissa Khan MD  3/16/2018

## 2018-03-16 NOTE — ROUTINE PROCESS
Bedside shift change report given to [facility], RN (oncoming nurse) by April Miller RN (offgoing nurse). Report included the following information SBAR, Kardex, Procedure Summary, Intake/Output, MAR, Accordion, Recent Results, Med Rec Status, Cardiac Rhythm SR and Alarm Parameters .

## 2018-03-16 NOTE — PROGRESS NOTES
PCU SHIFT NURSING NOTE      Bedside shift change report given to [facility] RN (oncoming nurse) by Jocelyn Murray RN (offgoing nurse). Report included the following information SBAR, Kardex, Intake/Output, Recent Results and Cardiac Rhythm NSR. Shift Summary:   2006: . Rate change increased to 1.3. 2 RNs dual sign off complete   2200: Insulin drip stopped per MAR. . 1 unit of Humalog administer. 0000: . Will monitor   Bedside shift change report given to [facility] (oncoming nurse) by Nikky Harris RN (offgoing nurse). Report included the following information SBAR, Kardex, Intake/Output and Recent Results. Admission Date 3/15/2018   Admission Diagnosis DKA (diabetic ketoacidoses) ([facility] [address]  ANN (acute kidney injury) ([facility] [address]   Consults IP CONSULT TO NEPHROLOGY  IP CONSULT TO PSYCHOLOGY        Consults   []PT   []OT   []Speech   []Case Management      [] Palliative      Cardiac Monitoring Order   []Yes   []No     IV drips   []Yes    Drip:                            Dose:  Drip:                            Dose:  Drip:                            Dose:   []No     GI Prophylaxis   []Yes   []No         DVT Prophylaxis   SCDs:             Charles stockings:         [] Medication   []Contraindicated   []None      Activity Level Activity Level: Up with Assistance     Activity Assistance: Complete care   Purposeful Rounding every 1-2 hour? []Yes   Mckoy Score  Total Score: 4   Bed Alarm (If score 3 or >)   []Yes   [] Refused (See signed refusal form in chart)   Cyrus Score  Cyrus Score: 15   Cyrus Score (if score 14 or less)   []PMT consult   []Wound Care consult      []Specialty bed   [] Nutrition consult          Needs prior to discharge:   Home O2 required:    []Yes   []No    If yes, how much O2 required?     Other:    Last Bowel Movement: Last Bowel Movement Date: 03/15/18      Influenza Vaccine Received Flu Vaccine for Current Season (usually Sept-March): Yes        Pneumonia Vaccine           Diet Active Orders   Diet    DIET DIABETIC CONSISTENT CARB Regular      LDAs               Peripheral IV 03/15/18 Right Antecubital (Active)   Site Assessment Clean, dry, & intact 3/16/2018  3:31 PM   Phlebitis Assessment 0 3/16/2018  3:31 PM   Infiltration Assessment 0 3/16/2018  3:31 PM   Dressing Status Clean, dry, & intact 3/16/2018  3:31 PM   Dressing Type Tape;Transparent 3/16/2018  3:31 PM   Hub Color/Line Status Pink; Infusing;Patent 3/16/2018  3:31 PM   Action Taken Blood drawn 3/15/2018 10:58 AM   Alcohol Cap Used Yes 3/16/2018  3:43 AM       Peripheral IV 03/15/18 Left Antecubital (Active)   Site Assessment Intact 3/16/2018  3:31 PM   Phlebitis Assessment 0 3/16/2018  3:31 PM   Infiltration Assessment 0 3/16/2018  3:31 PM   Dressing Status Intact 3/16/2018  3:31 PM   Dressing Type Tape;Transparent 3/16/2018  3:31 PM   Hub Color/Line Status Flushed 3/16/2018  3:31 PM   Alcohol Cap Used Yes 3/16/2018  3:43 AM          External Female Catheter 03/16/18 (Active)   Site Assessment Clean, dry, & intact 3/16/2018 11:13 AM   Repositioned Yes 3/16/2018 11:13 AM   Perineal Care Yes 3/16/2018 11:13 AM   Wick Changed Yes 3/16/2018 11:13 AM   Suction Canister/Tubing Changed Yes 3/16/2018 11:13 AM                Urinary Catheter      Intake & Output   Date 03/15/18 1900 - 03/16/18 0659 03/16/18 0700 - 03/17/18 0659   Shift 5886-9653 24 Hour Total 2011-4878 8654-9534 24 Hour Total   I  N  T  A  K  E   I.V.  (mL/kg/hr)   1212.8  (1)  1212.8      Insulin Volume   36.2  36.2      Volume (0.9% sodium chloride infusion)   1176.7  1176.7    Shift Total  (mL/kg)   1212.8  (11.5)  1212.8  (11.5)   O  U  T  P  U  T   Urine  (mL/kg/hr)   500  (0.4)  500      Urine Voided   500  500      Urine Occurrence(s)   1 x  1 x    Shift Total  (mL/kg)   500  (4.7)  500  (4.7)   NET   712.8  712.8   Weight (kg) 105.7 105.7 105.7 105.7 105.7         Readmission Risk Assessment Tool Score Medium Risk            14       Total Score        3 Has Seen PCP in Last 6 Months (Yes=3, No=0)    9 Pt. Coverage (Medicare=5 , Medicaid, or Self-Pay=4)    2 Charlson Comorbidity Score (Age + Comorbid Conditions)        Criteria that do not apply:    . Living with Significant Other. Assisted Living. LTAC. SNF.  or   Rehab    Patient Length of Stay (>5 days = 3)    IP Visits Last 12 Months (1-3=4, 4=9, >4=11)       Expected Length of Stay 2d 21h   Actual Length of Stay 1

## 2018-03-16 NOTE — PROGRESS NOTES
Hospitalist Progress Note    NAME: Elizabeth Flores   :  1960   MRN:  [id number]       Assessment / Plan:  Uncontrolled type 2 diabetes with DKA: DKA rsolved  -continue insulin gtt for now and stop at 9 pm today, give Lantus 8 units at 8pm  -Humalog 5 units TID AC and low intensity SSI ordered  -transition off dextrose enriched IVF's when insulin gtt is stopped    Acute kidney injury on chronic kidney disease stage III:  -continue IVF's  -hold nephrotoxins as well s home diuretic and cozaar  -repeat renal ultrasound  -Nephrology consulted, case briefly discussed with Dr. Jay Jay Esparza  -monitor renal function    Leukocytosis: likely reactive, AF, UA and CXR wnl, BCx's pending  -monitor    Acute Pancreatitis: POA   Intractable N/V: from DKA, resolved  GERD:  -diet advanced and reglan changed to prn  -monitor lipase and consider CT A/P pending Lipase trends as well as tolerance for PO  -pepcid    Chronic venous stasis:  -monitor    Hypothyroidism:  -TSH wnl, continue home levothyroxine    Hypertension:  -will consider discontinuing her Norvasc once she is off of IVF's  -hold home Cozaar as above  -prn IV hydralazine    Tobacco use and suspected COPD  -prn nicotine patch    Psychiatry Consult Placed as family does not feel that patient has the ability to make her own decisions. She is cooperative and seem very withdrawn with a flat affect. CODE STATUS: Full Code    DVT prophylaxis: sq heparin   GI Prophylaxis: pepcid asabove  Baseline: independent    Body mass index is 44.02 kg/(m^2).        Subjective:     Chief Complaint / Reason for Physician Visit: follow-up DKA  Patient seen for follow-up  DKA improved  Renal function not improving  Patient states that she is hungry and her N/V has resolved    Review of Systems:  Symptom Y/N Comments  Symptom Y/N Comments   Fever/Chills n   Chest Pain n    Poor Appetite    Edema y chronic   Cough    Abdominal Pain n    Sputum    Joint Pain     SOB/RAMIREZ n   Pruritis/Rash Nausea/vomit    Tolerating PT/OT     Diarrhea    Tolerating Diet     Constipation    Other       Could NOT obtain due to:      Objective:     VITALS:   Last 24hrs VS reviewed since prior progress note. Most recent are:  Patient Vitals for the past 24 hrs:   Temp Pulse Resp BP SpO2   03/16/18 0716 98.8 °F (37.1 °C) 78 17 107/58 97 %   03/16/18 0343 98.7 °F (37.1 °C) 75 14 148/56 95 %   03/15/18 2207 98.3 °F (36.8 °C) 74 16 151/58 99 %   03/15/18 2130 - 74 15 - 96 %   03/15/18 2115 - 72 15 138/52 96 %   03/15/18 2100 - 73 16 156/56 97 %   03/15/18 2045 - 77 21 147/54 98 %   03/15/18 2000 - 72 - 137/45 97 %   03/15/18 1930 - - - 128/57 98 %   03/15/18 1900 - 66 - 127/42 97 %   03/15/18 1845 - 69 - 124/48 97 %   03/15/18 1830 - 71 - 135/62 97 %   03/15/18 1653 98.5 °F (36.9 °C) 80 16 155/62 93 %     No intake or output data in the 24 hours ending 03/16/18 1046     PHYSICAL EXAM:  General: WD, WN. Alert, cooperative, no acute distress    EENT:  EOMI. Anicteric sclerae. MM dry  Resp:  CTA bilaterally, no wheezing or rales. No accessory muscle use  CV:  Regular  rhythm,  No edema  GI:  Soft, Non distended but increased girth secondary to body habitus, mildly tender.  +Bowel sounds  Neurologic:  Alert and oriented, monotone speech   Psych:   Poor insight. Not anxious nor agitated, flat affect  Skin:  No rashes. No jaundice    Reviewed most current lab test results and cultures  YES  Reviewed most current radiology test results   YES  Review and summation of old records today    NO  Reviewed patient's current orders and MAR    YES  PMH/SH reviewed - no change compared to H&P  ________________________________________________________________________  Care Plan discussed with:    Comments   Patient x    Family      RN x    Care Manager     Consultant  x Dr. Roberto Sanches team rounds were held today with , nursing, pharmacist and clinical coordinator.   Patient's plan of care was discussed; medications were reviewed and discharge planning was addressed. ________________________________________________________________________  Total NON critical care TIME:  35   Minutes    Total CRITICAL CARE TIME Spent:   Minutes non procedure based      Comments   >50% of visit spent in counseling and coordination of care x    ________________________________________________________________________  Vincenzo Cam MD     Procedures: see electronic medical records for all procedures/Xrays and details which were not copied into this note but were reviewed prior to creation of Plan. LABS:  I reviewed today's most current labs and imaging studies.   Pertinent labs include:  Recent Labs      03/16/18   0136  03/15/18   1045   WBC  13.3*  14.0*   HGB  12.6  14.1   HCT  41.3  46.9   PLT  245  298     Recent Labs      03/16/18   0950  03/16/18   0459  03/16/18   0136  03/15/18   1725  03/15/18   1045   NA  137  133*  135*  133*  133*   K  3.6  4.5  4.4  3.8  4.4   CL  102  100  98  94*  87*   CO2  28  19*  21  19*  10*   GLU  158*  286*  260*  324*  680*   BUN  46*  47*  48*  43*  41*   CREA  2.63*  2.64*  2.76*  2.48*  2.49*   CA  8.0*  7.7*  7.7*  7.7*  9.0   MG   --    --   1.6   --   1.8   PHOS   --    --   3.7   --    --    ALB   --    --   3.6  3.6  4.0   TBILI   --    --   0.8  0.6  0.8   SGOT   --    --   8*  9*  6*   ALT   --    --   11*  11*  15       Signed: Vincenzo Cam MD

## 2018-03-16 NOTE — ED NOTES
TRANSFER - OUT REPORT:    Verbal report given to Ernesto RN(name) on [facility]  being transferred to PCU 2240(unit) for routine progression of care       Report consisted of patients Situation, Background, Assessment and   Recommendations(SBAR). Information from the following report(s) SBAR, Kardex, ED Summary, [facility] and Recent Results was reviewed with the receiving nurse. Lines:       Opportunity for questions and clarification was provided.       Patient transported with:   Registered Nurse

## 2018-03-16 NOTE — PROGRESS NOTES
Initial Nutrition Assessment:    INTERVENTIONS/RECOMMENDATIONS:   · Meals/Snacks: General/healthful diet: Advance diet as tolerated    ASSESSMENT:   Patient medically noted for DKA, ANN, and nausea/vomiting. PMH for DM and GERD. Patient remains NPO at this time. Episodes of vomiting overnight. Elevated Lipase. DTC consulted for DM education. Will monitor diet advancement, diet tolerance, PO intake, and need for ONS. Diet Order: NPO  % Eaten:  No data found. Pertinent Medications: [x]Reviewed []Other: Norvasc, Famotidine, Humalog, Synthroid, Reglan  Pertinent Labs: [x]Reviewed []Other: Na 133, -257-286, Lipase 1205, A1c 8.0  Food Allergies: [x]None []Other   Last BM: 3/15   []Active     []Hyperactive  [x]Hypoactive       [] Absent BS  Skin:    [x] Intact   [] Incision  [] Breakdown  [x] Other: edema    Anthropometrics:   Height: 5' 1\" (154.9 cm) Weight: 105.7 kg (233 lb)   IBW (%IBW):   ( ) UBW (%UBW):   (  %)   Last Weight Metrics:  Weight Loss Metrics 3/15/2018 3/15/2018 1/24/2018 6/26/2017 6/3/2017 9/13/2016 4/25/2014   Today's Wt 233 lb 240 lb 250 lb 10.6 oz 241 lb 260 lb 272 lb 267 lb   BMI 44.02 kg/m2 45.35 kg/m2 47.36 kg/m2 45.54 kg/m2 47.55 kg/m2 51.39 kg/m2 52.14 kg/m2       BMI: Body mass index is 44.02 kg/(m^2). This BMI is indicative of:   []Underweight    []Normal    []Overweight    [] Obesity   [x] Extreme Obesity (BMI>40)     Estimated Nutrition Needs (Based on):   1595 Kcals/day (MR (1572) x 1. 3AF -500kcal) , 85 g (0.8 g/kg bw) Protein  Carbohydrate:  At Least 130 g/day  Fluids: 1550 mL/day (1ml/kcal)    Pt expected to meet estimated nutrient needs: []Yes [x]No (currently NPO)    NUTRITION DIAGNOSES:   Problem:  Altered nutrition-related lab values      Etiology: related to current medical condition     Signs/Symptoms: as evidenced by , lipase 1205, Na 133      NUTRITION INTERVENTIONS:  Meals/Snacks: General/healthful diet                  GOAL:   Diet advanced as tolerated next 2-4 days    LEARNING NEEDS (Diet, Food/Nutrient-Drug Interaction):    [x] None Identified   [] Identified and Education Provided/Documented   [] Identified and Pt declined/was not appropriate     Cultureal, Restorationist, OR Ethnic Dietary Needs:    [x] None Identified   [] Identified and Addressed     [x] Interdisciplinary Care Plan Reviewed/Documented    [x] Discharge Planning: CCD       MONITORING /EVALUATION:   Behavioral-Environmental Outcomes: Behavior  Food/Nutrient Intake Outcomes:  Total energy intake  Physical Signs/Symptoms Outcomes: Weight/weight change, Electrolyte and renal profile, Glucose profile    NUTRITION RISK:    [x] High              [] Moderate           []  Low  []  Minimal/Uncompromised    PT SEEN FOR:    []  MD Consult: []Calorie Count      []Diabetic Diet Education        []Diet Education     []Electrolyte Management     []General Nutrition Management and Supplements     []Management of Tube Feeding     []TPN Recommendations    [x]  RN Referral:  [x]MST score >=2     []Enteral/Parenteral Nutrition PTA     []Pregnant: Gestational DM or Multigestation     []Pressure Ulcer/Wound Care needs        []  Low BMI  []  JIMENA Gonzaless  Pager [phone]                 Weekend Pager [phone]

## 2018-03-16 NOTE — CDMP QUERY
Please give the clinical significance of the following lab data. lipase 1205  amylase 155    Please clarify and document your clinical opinion in the progress notes and discharge summary including the definitive and/or presumptive diagnosis, (suspected or probable), related to the above clinical findings. Please include clinical findings supporting your diagnosis.     Thanks,  Soraya Camejo RN/CDMP

## 2018-03-16 NOTE — PROGRESS NOTES
PCU SHIFT NURSING NOTE      Bedside shift change report given to Nia Cox (oncoming nurse) by Krissy Marte (offgoing nurse). Report included the following information SBAR. Shift Summary: 0- MD Isela Nunez paged due to the determination that the glucostabalizer was not entered correctly when started. The decision was made to change the multiplier back to the original setting and change the parameters to the proper numbers and reassess in the am. Charge aware. Admission Date 3/15/2018   Admission Diagnosis DKA (diabetic ketoacidoses) ([facility] [address]  ANN (acute kidney injury) ([facility] [address]   Consults IP CONSULT TO HOSPITALIST        Consults   []PT   []OT   []Speech   []Case Management      [] Palliative      Cardiac Monitoring Order   []Yes   []No     IV drips   []Yes    Drip:                            Dose:  Drip:                            Dose:  Drip:                            Dose:   []No     GI Prophylaxis   []Yes   []No         DVT Prophylaxis   SCDs:             Charles stockings:         [] Medication   []Contraindicated   []None      Activity Level           Purposeful Rounding every 1-2 hour? []Yes   Mckoy Score  Total Score: 3   Bed Alarm (If score 3 or >)   []Yes   [] Refused (See signed refusal form in chart)   Cyrus Score  Cyrus Score: (P) 18   Cyrus Score (if score 14 or less)   []PMT consult   []Wound Care consult      []Specialty bed   [] Nutrition consult          Needs prior to discharge:   Home O2 required:    []Yes   []No    If yes, how much O2 required? Other:    Last Bowel Movement: Last Bowel Movement Date: (P) 03/15/18      Influenza Vaccine          Pneumonia Vaccine           Diet Active Orders   Diet    DIET NPO      LDAs                                 Urinary Catheter      Intake & Output        Readmission Risk Assessment Tool Score Low Risk            11       Total Score        9 Pt.  Coverage (Medicare=5 , Medicaid, or Self-Pay=4)    2 Charlson Comorbidity Score (Age + Comorbid Conditions) Criteria that do not apply:    Has Seen PCP in Last 6 Months (Yes=3, No=0)    . Living with Significant Other. Assisted Living. LTAC. SNF.  or   Rehab    Patient Length of Stay (>5 days = 3)    IP Visits Last 12 Months (1-3=4, 4=9, >4=11)       Expected Length of Stay - - -   Actual Length of Stay 1

## 2018-03-16 NOTE — ED NOTES
Pt had episode of vomiting but does not want nausea medicine at this time because she says it makes it worse

## 2018-03-16 NOTE — DIABETES MGMT
DTC Consult Note    Recommendations/ Comments: Once pt has 2 consecutive anion gap levels less than 12 then would transition back to basal/bolus regimen. Would consider lantus 15units Qevening with 2hr gtt overlap (consider earlier than HS timing secondary to pt reporting overnight hypoglycemia at home), humalog 5units ac tid plus high sensitivity correction, discontinue dextrose IVF when insulin gtt stopped. Due to distance pt lives from here, pt was not scheduled for outpt education appt. Enc pt/family to discuss option of outpt appt, travel needs as pt would need a ride and call to schedule if desired. Strongly enc pt to schedule. Current hospital DM medication: insulin gtt    Consult received for:       [x]             Outpatient education         Chart reviewed and initial evaluation complete on Jayne Lizama. Patient is a 59 y.o. female with known Type 2 Diabetes on humalog kwikpen 5units ac tid and lantus (pt unsure about dose, but reports 15units) Qhs via vial/syringe at home. Pt reports frequent hypoglycemia fasting down to 30s and often requiring assistance of EMS for treatment due to combativeness. Some daytime hypoglycemia as well. Pt has a caregiver that helps her in the home that calls EMS per family currently at bedside. Pt reports she sometimes forgets to take her lantus secondary to falling asleep. Discussed actions/timing of both humalog and lantus and enc pt to move lantus to an earlier time in the day to help prevent missed doses. Discussed need for lantus daily to prevent ketosis. Pt is monitoring her BG ac tid. Per family pt saw a NP for her DM appox 3wks ago and was told not to eat anything white. Pt therefore has cut all CHO from her diet. Reviewed basic meal planning using plate method. Enc pt to limit CHO portions to 45gm/ meal, add in non-starchy veggies and limit snacking.   Family feels her hypoglycemia may be related to her diet changes over the last 3 wks and that pt may have been confused by what she was taught. Discussed need to contact MD for dose adjustment should hypoglycemia continue with addition of portion controlled CHO back in diet and may need adjustment in lantus as well. Assessed and instructed patient on the following:   ·  interpretation of lab results, hypoglycemia prevention and treatment, nutrition and referred to Diabetes Educator    Provided patient with the following: [x]             Survival skills education materials               [x]             Insulin education materials               []             CHO counting education materials               [x]             Outpatient DTC contact number               []             Glucometer               Discussed with patient and/or family need for follow up appointment for diabetes management after discharge. A1c:   Lab Results   Component Value Date/Time    Hemoglobin A1c 8.0 (H) 03/16/2018 01:36 AM       Recent Glucose Results:   Lab Results   Component Value Date/Time     (H) 03/16/2018 09:50 AM     (H) 03/16/2018 04:59 AM     (H) 03/16/2018 01:36 AM    GLUCPOC 165 (H) 03/16/2018 02:50 PM    GLUCPOC 150 (H) 03/16/2018 01:35 PM    GLUCPOC 125 (H) 03/16/2018 12:14 PM        Lab Results   Component Value Date/Time    Creatinine 2.63 (H) 03/16/2018 09:50 AM     Estimated Creatinine Clearance: 25.8 mL/min (based on Cr of 2.63). Active Orders   Diet    DIET DIABETIC CONSISTENT CARB Regular        PO intake: No data found. Will continue to follow as needed. Thank you.   YEVGENIY DanielleN, RN, [address]

## 2018-03-16 NOTE — H&P
Hospitalist Admission Note    NAME: Dusty Mcdowell   :  1960   MRN:  [id number]     Date/Time:  3/15/2018 8:47 PM    Patient PCP: Destiny Levy MD  ______________________________________________________________________  Given the patient's current clinical presentation, I have a high level of concern for decompensation if discharged from the emergency department. Complex decision making was performed, which includes reviewing the patient's available past medical records, laboratory results, and x-ray films. My assessment of this patient's clinical condition and my plan of care is as follows. Assessment / Plan:    Uncontrolled type 2 diabetes with DKA  -We will continue patient on insulin drip and she until she can be transitioned to subcu insulin  -We will keep her n.p.o. until her nausea vomiting has resolved  -We will have the diabetic educator see her in the morning  -We will hydrate her and follow her electrolytes closely  -No definitive source of infection, her chest x-ray was negative, her urinalysis did not show infection only ketones 15, she has had some cough but I am not convinced that she has an acute infection at this time, she is a long-term smoker, so will need to follow closely and repeat her white count in the morning which currently is mildly elevated may be reactive    Acute kidney injury on chronic kidney disease stage III-her creatinine in January was 1.3 to her currently 2.4, we will hydrate her and follow her electrolytes and treat her DKA, I suspect will improve if not will need nephrology involved in some renal imaging    Recurrent bouts of nausea vomiting current intractable symptoms-we will place her on Reglan scheduled, for possible underlying gastroparesis, consider further studies also as needed Zofran and will keep her n.p.o. until vomiting subsides.     Chronic venous stasis-she appears to have chronic changes no cellulitis at this time, will ask wound care to see in the morning the wraps were removed this morning she uses chronic wraps at home and will hold her diuretics for now    Hypothyroidism we will continue her Synthroid if she is able to take p.o.'s and check a TSH    Hypertension-we will continue her Norvasc hold her losartan until his creatinine improves    GERD-patient does have some epigastric discomfort on palpation although denied any abdominal pain, will place her on some Pepcid for now with her nausea vomiting, I do not see that she is on Protonix at home    Tobacco use and suspected COPD-does not clearly have an exacerbation at this time, will hold on steroids and will place her on some nebulizer treatments hold on antibiotics at this time her chest x-ray was negative    CODE STATUS she does not have a living will she says she has designated her sister is healthcare power [phone] currently she would like to be a full code    DVT prophylaxis will be placed on heparin subcu and will mobilize with physical therapy. GI Prophylaxis: pepcid  Baseline: independent      Subjective:   CHIEF COMPLAINT: Nausea vomiting, DKA    HISTORY OF PRESENT ILLNESS:     Margarita Gant is a 59 y.o.  woman with a history of obesity, diabetes with prior episodes of DKA, chronic venous stasis, hypothyroidism, hypertension, recurrent bouts of nausea vomiting who reports that she started getting sick on Saturday with nausea vomiting poor appetite has not been taking her insulin regularly was trying to manage at home did not want to come to the hospital but got sicker and sicker. She denies any abdominal pain she has had some loose stools but the last time was 2 days ago no blood in her vomit no blood in her stool or melena. Her sugars have been running in the 300 range last night they were over 600 and this morning they were over 600 she felt poorly and came to the ER was seen at [facility] this morning.     In the ER at [facility] she was found to have DKA with sugars greater than 600, elevated white count of 14, acute renal failure with a creatinine of 2.4, negative chest x-ray, negative urine, she was started on an insulin drip. Her pH was 7.11 on ABG and she was transferred to this ER for further evaluation and management. In our ER, her vital signs have been stable continues on insulin drip and we were asked to admit for further evaluation and management. Her anion gap is still elevated from 36-20 currently. Her bicarb has gone from 10-19 so that is improving as well. The patient reports that she is thirsty and is requesting some coffee. She says she still does not feel well but cannot describe exactly what her symptoms are currently. She denies any abdominal pain and currently no nausea vomiting. She admits to having a cough that is dry and some shortness of breath since Saturday she is unsure which started first and nausea vomiting or the cough. She is a smoker. She denies any focal weakness numbness or tingling no current lightheadedness or dizziness no chest pain or pressure. She is not had any fevers she says she has felt chilled in the last few days. And again requests some coffee. Review of systems complete review of systems is done except as noted above in H&P was negative     past medical history: Chronic venous stasis on diuretics and has leg wraps which were removed this morning  Type 2 diabetes with episodes of DKA in the past  Hypothyroidism  Hypertension  Hyperlipidemia  Recurrent bouts of nausea vomiting but no diagnosed gastroparesis  Arthritis  Reflux  Obesity  Chronic kidney disease stage III  COPD with ongoing tobacco use    We were asked to admit for work up and evaluation of the above problems.      Past Medical History:   Diagnosis Date    Arthritis     Asthma     Bronchitis     GERD (gastroesophageal reflux disease)     Hypercholesterolemia     Hypertension     Hypothyroid  IDDM [facility] dependent diabetes mellitus) [facility] [facility]     Morbid obesity ([facility] [address] [facility]     Peripheral neuropathy     Thyroid disease     Venous insufficiency         Past Surgical History:   Procedure Laterality Date    HX AMPUTATION      HX COLONOSCOPY         Social History   Substance Use Topics    Smoking status: Current Every Day Smoker     Packs/day: 0.50     Types: Cigarettes     Last attempt to quit: 2012    Smokeless tobacco: Never Used    Alcohol use Yes      Comment: social        Family History   Problem Relation Age of Onset    COPD Mother     COPD Father    Social history patient lives alone she is single has no children she is a current smoker she says she smokes occasionally admits to only occasional alcohol nothing regular denies drug use     family history she says her mother is healthy and alive her father  with COPD  No Known Allergies     Prior to Admission medications    Medication Sig Start Date End Date Taking? Authorizing Provider   levothyroxine (SYNTHROID) 150 mcg tablet Take 150 mcg by mouth Daily (before breakfast). Yes Historical Provider   potassium chloride SR (KLOR-CON 10) 10 mEq tablet Take 10 mEq by mouth two (2) times a day. Yes Historical Provider   amLODIPine (NORVASC) 5 mg tablet Take 5 mg by mouth daily. 8/3/16  Yes Historical Provider   insulin aspart (NOVOLOG FLEXPEN) 100 unit/mL flexpen 5 Units by SubCUTAneous route Before breakfast, lunch, and dinner. Yes Historical Provider   gabapentin (NEURONTIN) 300 mg capsule Take 600 mg by mouth nightly. Yes Historical Provider   torsemide (DEMADEX) 20 mg tablet Take 40 mg by mouth two (2) times a day. Yes Historical Provider   losartan (COZAAR) 25 mg tablet Take 25 mg by mouth daily. Yes Historical Provider   insulin glargine (LANTUS) 100 unit/mL injection 8 Units by SubCUTAneous route nightly. Yes Historical Provider   ZINC PO Take 1 Tab by mouth two (2) times a day.    Yes Historical Provider   aspirin delayed-release 81 mg tablet Take 81 mg by mouth daily. Yes Historical Provider   ipratropium-albuterol (COMBIVENT RESPIMAT)  mcg/actuation inhaler Take 1 Puff by inhalation every six (6) hours as needed for Shortness of Breath. Historical Provider       REVIEW OF SYSTEMS:   See above  I am not able to complete the review of systems because:    The patient is intubated and sedated    The patient has altered mental status due to his acute medical problems    The patient has baseline aphasia from prior stroke(s)    The patient has baseline dementia and is not reliable historian    The patient is in acute medical distress and unable to provide information             Objective:   VITALS:    Visit Vitals    /57    Pulse 66    Temp 98.5 °F (36.9 °C)    Resp 16    Ht 5' 1\" (1.549 m)    Wt 105.7 kg (233 lb)    SpO2 98%    BMI 44.02 kg/m2       PHYSICAL EXAM:    Patient is mildly ill-appearing looks much much older than her stated age on nasal cannula in no distress awakens easily easily and is oriented ×3 extraocular movements are intact sclera nonicteric conjunctivae pink oropharynx mucous membranes are very dry no thrush or lesions neck is supple nontender no lymphadenopathy no JVD no carotid bruits no thyromegaly or nodules appreciated cardiovascular regular rate no obvious murmurs rubs or gallops lungs decreased breath sounds in the bases but no wheezes rhonchi or crackles abdomen is morbidly obese bowel sounds present soft tender in the epigastrium, otherwise nontender no hepatosplenomegaly no bladder distention or tenderness extremities bilateral chronic skin changes on her shins nothing that looks acute or cellulitic chronic edema not too bad currently no clubbing or cyanosis her feet are warm to touch neuro exam is grossly nonfocal she has no upper extremity abnormalities good strength and sensation in her lower extremities both weak she is able to lift them both up off the bed but was not able to give much resistance sensation intact skin exam no rashes or lesions or decubitus ulcers are present on admission other than the skin changes that are chronic on her shins  _______________________________________________________________________  Care Plan discussed with:    Comments   Patient y    Family      RN y    Care Manager                    Consultant:      _______________________________________________________________________  Expected  Disposition:   Home with Family    HH/PT/OT/RN    SNF/LTC    FAMILIA    ________________________________________________________________________  TOTAL TIME:   Minutes    Critical Care Provided     Minutes non procedure based      Comments     Reviewed previous records   >50% of visit spent in counseling and coordination of care  Discussion with patient and/or family and questions answered       ________________________________________________________________________  Signed: Shadia Nayak MD    Procedures: see electronic medical records for all procedures/Xrays and details which were not copied into this note but were reviewed prior to creation of Plan.     LAB DATA REVIEWED:    Recent Results (from the past 24 hour(s))   GLUCOSE, POC, BEDSIDE    Collection Time: 03/15/18 10:40 AM   Result Value Ref Range    Glucose (POC) >600 (HH) 65 - 100 mg/dL    Performed by [id number]     CBC WITH AUTOMATED DIFF    Collection Time: 03/15/18 10:45 AM   Result Value Ref Range    WBC 14.0 (H) 3.6 - 11.0 K/uL    RBC 5.74 (H) 3.80 - 5.20 M/uL    HGB 14.1 11.5 - 16.0 g/dL    HCT 46.9 35.0 - 47.0 %    MCV 81.7 80.0 - 99.0 FL    MCH 24.6 (L) 26.0 - 34.0 PG    MCHC 30.1 30.0 - 36.5 g/dL    RDW 14.6 (H) 11.5 - 14.5 %    PLATELET 737 043 - 260 K/uL    MPV 11.0 8.9 - 12.9 FL    NRBC 0.0 0  WBC    ABSOLUTE NRBC 0.00 0.00 - 0.01 K/uL    NEUTROPHILS 91 (H) 32 - 75 %    LYMPHOCYTES 4 (L) 12 - 49 %    MONOCYTES 4 (L) 5 - 13 %    EOSINOPHILS 0 0 - 7 %    BASOPHILS 0 0 - 1 %    IMMATURE GRANULOCYTES 1 (H) 0.0 - 0.5 %    ABS. NEUTROPHILS 12.7 (H) 1.8 - 8.0 K/UL    ABS. LYMPHOCYTES 0.6 (L) 0.8 - 3.5 K/UL    ABS. MONOCYTES 0.6 0.0 - 1.0 K/UL    ABS. EOSINOPHILS 0.0 0.0 - 0.4 K/UL    ABS. BASOPHILS 0.0 0.0 - 0.1 K/UL    ABS. IMM. GRANS. 0.1 (H) 0.00 - 0.04 K/UL    DF SMEAR SCANNED      RBC COMMENTS NORMOCYTIC, NORMOCHROMIC     METABOLIC PANEL, COMPREHENSIVE    Collection Time: 03/15/18 10:45 AM   Result Value Ref Range    Sodium 133 (L) 136 - 145 mmol/L    Potassium 4.4 3.5 - 5.1 mmol/L    Chloride 87 (L) 97 - 108 mmol/L    CO2 10 (LL) 21 - 32 mmol/L    Anion gap 36 (H) 5 - 15 mmol/L    Glucose 680 (HH) 65 - 100 mg/dL    BUN 41 (H) 7.0 - 18.0 MG/DL    Creatinine 2.49 (H) 0.55 - 1.02 MG/DL    BUN/Creatinine ratio 16 12 - 20      GFR est AA 24 (L) >60 ml/min/1.73m2    GFR est non-AA 20 (L) >60 ml/min/1.73m2    Calcium 9.0 8.5 - 10.1 MG/DL    Bilirubin, total 0.8 0.2 - 1.0 MG/DL    ALT (SGPT) 15 14 - 63 U/L    AST (SGOT) 6 (L) 15 - 37 U/L    Alk.  phosphatase 165 (H) 45 - 117 U/L    Protein, total 7.7 6.4 - 8.2 g/dL    Albumin 4.0 3.5 - 5.0 g/dL    Globulin 3.7 2.0 - 4.0 g/dL    A-G Ratio 1.1 1.1 - 2.2     TSH 3RD GENERATION    Collection Time: 03/15/18 10:45 AM   Result Value Ref Range    TSH 2.45 0.34 - 4.82 uIU/mL   MAGNESIUM    Collection Time: 03/15/18 10:45 AM   Result Value Ref Range    Magnesium 1.8 1.6 - 2.4 mg/dL   URINALYSIS W/ RFLX MICROSCOPIC    Collection Time: 03/15/18 10:45 AM   Result Value Ref Range    Color YELLOW/STRAW      Appearance CLEAR CLEAR      Specific gravity 1.025 1.003 - 1.030      pH (UA) 5.5 5.0 - 8.0      Protein 30 (A) NEG mg/dL    Glucose 500 (A) NEG mg/dL    Ketone 80 (A) NEG mg/dL    Blood TRACE (A) NEG      Urobilinogen 0.2 0.2 - 1.0 EU/dL    Nitrites NEGATIVE  NEG      Leukocyte Esterase NEGATIVE  NEG     BILIRUBIN, CONFIRM    Collection Time: 03/15/18 10:45 AM   Result Value Ref Range    Bilirubin UA, confirm NEGATIVE  NEG     URINE MICROSCOPIC ONLY    Collection Time: 03/15/18 10:45 AM   Result Value Ref Range    WBC 0-4 0 - 4 /hpf    RBC 0-5 0 - 5 /hpf    Epithelial cells FEW FEW /lpf    Bacteria NEGATIVE  NEG /hpf   ACETONE/KETONE, QL    Collection Time: 03/15/18 12:30 PM   Result Value Ref Range    Acetone/Ketone serum, QL. POSITIVE (A) NEG        GLUCOSE, POC, BEDSIDE    Collection Time: 03/15/18 12:31 PM   Result Value Ref Range    Glucose (POC) 483 (H) 65 - 100 mg/dL    Performed by [id number]     POC G3 - PUL    Collection Time: 03/15/18  1:25 PM   Result Value Ref Range    FIO2 (POC) 28 %    pH (POC) 7.119 (LL) 7.35 - 7.45      pCO2 (POC) 36.0 35.0 - 45.0 MMHG    pO2 (POC) 82 80 - 100 MMHG    HCO3 (POC) 11.7 (L) 22 - 26 MMOL/L    sO2 (POC) 91 (L) 92 - 97 %    Base deficit (POC) 18 mmol/L    Site LEFT RADIAL      Device: NASAL CANNULA      Flow rate (POC) 2 L/M    Allens test (POC) YES      Specimen type (POC) ARTERIAL     EKG, 12 LEAD, INITIAL    Collection Time: 03/15/18  1:34 PM   Result Value Ref Range    Ventricular Rate 80 BPM    Atrial Rate 80 BPM    P-R Interval 150 ms    QRS Duration 130 ms    Q-T Interval 436 ms    QTC Calculation (Bezet) 502 ms    Calculated P Axis 72 degrees    Calculated R Axis 98 degrees    Calculated T Axis 37 degrees    Diagnosis       Normal sinus rhythm  Possible Left atrial enlargement  Right bundle branch block  Abnormal ECG  When compared with ECG of 24-JAN-2018 06:47,  premature ventricular complexes are no longer present  Right bundle branch block has replaced Incomplete right bundle branch block     GLUCOSE, POC, BEDSIDE    Collection Time: 03/15/18  1:40 PM   Result Value Ref Range    Glucose (POC) 401 (H) 65 - 100 mg/dL    Performed by [id number]     GLUCOSE, POC, BEDSIDE    Collection Time: 03/15/18  2:35 PM   Result Value Ref Range    Glucose (POC) 367 (H) 65 - 100 mg/dL    Performed by [id number]     GLUCOSE, POC    Collection Time: 03/15/18  4:40 PM   Result Value Ref Range    Glucose (POC) 341 (H) 65 - 100 mg/dL    Performed by Yesi Handley Treasure    METABOLIC PANEL, COMPREHENSIVE    Collection Time: 03/15/18  5:25 PM   Result Value Ref Range    Sodium 133 (L) 136 - 145 mmol/L    Potassium 3.8 3.5 - 5.1 mmol/L    Chloride 94 (L) 97 - 108 mmol/L    CO2 19 (L) 21 - 32 mmol/L    Anion gap 20 (H) 5 - 15 mmol/L    Glucose 324 (H) 65 - 100 mg/dL    BUN 43 (H) 6 - 20 MG/DL    Creatinine 2.48 (H) 0.55 - 1.02 MG/DL    BUN/Creatinine ratio 17 12 - 20      GFR est AA 24 (L) >60 ml/min/1.73m2    GFR est non-AA 20 (L) >60 ml/min/1.73m2    Calcium 7.7 (L) 8.5 - 10.1 MG/DL    Bilirubin, total 0.6 0.2 - 1.0 MG/DL    ALT (SGPT) 11 (L) 12 - 78 U/L    AST (SGOT) 9 (L) 15 - 37 U/L    Alk.  phosphatase 142 (H) 45 - 117 U/L    Protein, total 6.7 6.4 - 8.2 g/dL    Albumin 3.6 3.5 - 5.0 g/dL    Globulin 3.1 2.0 - 4.0 g/dL    A-G Ratio 1.2 1.1 - 2.2     GLUCOSE, POC    Collection Time: 03/15/18  5:56 PM   Result Value Ref Range    Glucose (POC) 285 (H) 65 - 100 mg/dL    Performed by Kateryna Cortez    Collection Time: 03/15/18  6:29 PM   Result Value Ref Range    Glucose 285 mg/dL    Insulin order 4.5 units/hour    Insulin adminstered 4.5 units/hour    Multiplier 0.020     Low target 200 mg/dL    High target 300 mg/dL    D50 order 0.0 ml    D50 administered 0.00 ml    Minutes until next BG 60 min    Order initials RKJ     Administered initials LEG     GLSCOM Comments     GLUCOSE, POC    Collection Time: 03/15/18  7:32 PM   Result Value Ref Range    Glucose (POC) 271 (H) 65 - 100 mg/dL    Performed by Kateryna Cortez    Collection Time: 03/15/18  7:32 PM   Result Value Ref Range    Glucose 271 mg/dL    Insulin order 4.2 units/hour    Insulin adminstered 4.2 units/hour    Multiplier 0.020     Low target 200 mg/dL    High target 300 mg/dL    D50 order 0.0 ml    D50 administered 0.00 ml    Minutes until next BG 60 min    Order initials RKJ     Administered initials LEG     GLSCOM Comments     GLUCOSE, POC    Collection Time: 03/15/18  8:37 PM Result Value Ref Range    Glucose (POC) 208 (H) 65 - 100 mg/dL    Performed by Kateryna Cortez    Collection Time: 03/15/18  8:38 PM   Result Value Ref Range    Glucose 208 mg/dL    Insulin order 3.0 units/hour    Insulin adminstered 3.0 units/hour    Multiplier 0.020     Low target 200 mg/dL    High target 300 mg/dL    D50 order 0.0 ml    D50 administered 0.00 ml    Minutes until next BG 60 min    Order initials RKJ     Administered initials LEG     GLSCOM Comments

## 2018-03-16 NOTE — PROGRESS NOTES
Physical Therapy  Consult received and chart reviewed. Attempting to see patient for PT evaluation this am. Patient refusing any OOB mobility, stating \"I don't want to\". Educated patient on the importance of OOB mobility but patient continues to refuse. Nursing also attempting to encourage patient to participate in therapy but she continues to refuse. Patient appears sedentary at baseline and does not appear interested in therapy. Will follow back tomorrow to initiate evaluation if patient willing to participate.   Thank you,  Kellen Gonzalez, PT

## 2018-03-17 ENCOUNTER — APPOINTMENT (OUTPATIENT)
Dept: GENERAL RADIOLOGY | Age: 58
DRG: 637 | End: 2018-03-17
Attending: INTERNAL MEDICINE
Payer: MEDICARE

## 2018-03-17 LAB
ALBUMIN SERPL-MCNC: 2.9 G/DL (ref 3.5–5)
ALBUMIN/GLOB SERPL: 0.9 {RATIO} (ref 1.1–2.2)
ALP SERPL-CCNC: 123 U/L (ref 45–117)
ALT SERPL-CCNC: 13 U/L (ref 12–78)
ANION GAP SERPL CALC-SCNC: 6 MMOL/L (ref 5–15)
AST SERPL-CCNC: 13 U/L (ref 15–37)
BILIRUB SERPL-MCNC: 0.5 MG/DL (ref 0.2–1)
BUN SERPL-MCNC: 31 MG/DL (ref 6–20)
BUN/CREAT SERPL: 17 (ref 12–20)
CALCIUM SERPL-MCNC: 8.2 MG/DL (ref 8.5–10.1)
CHLORIDE SERPL-SCNC: 102 MMOL/L (ref 97–108)
CO2 SERPL-SCNC: 27 MMOL/L (ref 21–32)
CREAT SERPL-MCNC: 1.86 MG/DL (ref 0.55–1.02)
GLOBULIN SER CALC-MCNC: 3.2 G/DL (ref 2–4)
GLUCOSE BLD STRIP.AUTO-MCNC: 165 MG/DL (ref 65–100)
GLUCOSE BLD STRIP.AUTO-MCNC: 195 MG/DL (ref 65–100)
GLUCOSE BLD STRIP.AUTO-MCNC: 225 MG/DL (ref 65–100)
GLUCOSE BLD STRIP.AUTO-MCNC: 271 MG/DL (ref 65–100)
GLUCOSE BLD STRIP.AUTO-MCNC: 74 MG/DL (ref 65–100)
GLUCOSE SERPL-MCNC: 228 MG/DL (ref 65–100)
LIPASE SERPL-CCNC: 208 U/L (ref 73–393)
MAGNESIUM SERPL-MCNC: 1.7 MG/DL (ref 1.6–2.4)
PHOSPHATE SERPL-MCNC: 2.3 MG/DL (ref 2.6–4.7)
POTASSIUM SERPL-SCNC: 3.7 MMOL/L (ref 3.5–5.1)
PROT SERPL-MCNC: 6.1 G/DL (ref 6.4–8.2)
SERVICE CMNT-IMP: ABNORMAL
SERVICE CMNT-IMP: NORMAL
SODIUM SERPL-SCNC: 135 MMOL/L (ref 136–145)
URATE SERPL-MCNC: 7.7 MG/DL (ref 2.6–6)

## 2018-03-17 PROCEDURE — 80053 COMPREHEN METABOLIC PANEL: CPT | Performed by: INTERNAL MEDICINE

## 2018-03-17 PROCEDURE — 65660000000 HC RM CCU STEPDOWN

## 2018-03-17 PROCEDURE — 74011000250 HC RX REV CODE- 250: Performed by: INTERNAL MEDICINE

## 2018-03-17 PROCEDURE — 74011250637 HC RX REV CODE- 250/637: Performed by: INTERNAL MEDICINE

## 2018-03-17 PROCEDURE — 83690 ASSAY OF LIPASE: CPT | Performed by: INTERNAL MEDICINE

## 2018-03-17 PROCEDURE — 74011636637 HC RX REV CODE- 636/637: Performed by: INTERNAL MEDICINE

## 2018-03-17 PROCEDURE — 73620 X-RAY EXAM OF FOOT: CPT

## 2018-03-17 PROCEDURE — 84100 ASSAY OF PHOSPHORUS: CPT | Performed by: INTERNAL MEDICINE

## 2018-03-17 PROCEDURE — 83735 ASSAY OF MAGNESIUM: CPT | Performed by: INTERNAL MEDICINE

## 2018-03-17 PROCEDURE — 36415 COLL VENOUS BLD VENIPUNCTURE: CPT | Performed by: INTERNAL MEDICINE

## 2018-03-17 PROCEDURE — 77010033678 HC OXYGEN DAILY

## 2018-03-17 PROCEDURE — 74011250636 HC RX REV CODE- 250/636: Performed by: INTERNAL MEDICINE

## 2018-03-17 PROCEDURE — 84550 ASSAY OF BLOOD/URIC ACID: CPT | Performed by: INTERNAL MEDICINE

## 2018-03-17 PROCEDURE — 74011250636 HC RX REV CODE- 250/636: Performed by: EMERGENCY MEDICINE

## 2018-03-17 PROCEDURE — 77030038269 HC DRN EXT URIN PURWCK BARD -A

## 2018-03-17 PROCEDURE — 82962 GLUCOSE BLOOD TEST: CPT

## 2018-03-17 PROCEDURE — 74011250637 HC RX REV CODE- 250/637: Performed by: EMERGENCY MEDICINE

## 2018-03-17 RX ORDER — COLCHICINE 0.6 MG/1
0.6 TABLET ORAL ONCE
Status: COMPLETED | OUTPATIENT
Start: 2018-03-17 | End: 2018-03-17

## 2018-03-17 RX ORDER — INSULIN LISPRO 100 [IU]/ML
INJECTION, SOLUTION INTRAVENOUS; SUBCUTANEOUS
Status: DISCONTINUED | OUTPATIENT
Start: 2018-03-17 | End: 2018-03-20 | Stop reason: HOSPADM

## 2018-03-17 RX ORDER — NYSTATIN 100000 [USP'U]/G
POWDER TOPICAL 2 TIMES DAILY
Status: DISCONTINUED | OUTPATIENT
Start: 2018-03-17 | End: 2018-03-20 | Stop reason: HOSPADM

## 2018-03-17 RX ORDER — DEXTROSE 50 % IN WATER (D50W) INTRAVENOUS SYRINGE
12.5-25 AS NEEDED
Status: DISCONTINUED | OUTPATIENT
Start: 2018-03-17 | End: 2018-03-20 | Stop reason: HOSPADM

## 2018-03-17 RX ORDER — INSULIN GLARGINE 100 [IU]/ML
12 INJECTION, SOLUTION SUBCUTANEOUS
Status: DISCONTINUED | OUTPATIENT
Start: 2018-03-17 | End: 2018-03-18

## 2018-03-17 RX ORDER — MAGNESIUM SULFATE 100 %
4 CRYSTALS MISCELLANEOUS AS NEEDED
Status: DISCONTINUED | OUTPATIENT
Start: 2018-03-17 | End: 2018-03-18 | Stop reason: SDUPTHER

## 2018-03-17 RX ADMIN — HYDROCODONE BITARTRATE AND ACETAMINOPHEN 1 TABLET: 5; 325 TABLET ORAL at 22:14

## 2018-03-17 RX ADMIN — COLCHICINE 0.6 MG: 0.6 TABLET, FILM COATED ORAL at 16:50

## 2018-03-17 RX ADMIN — FAMOTIDINE 20 MG: 20 TABLET, FILM COATED ORAL at 08:22

## 2018-03-17 RX ADMIN — HYDROCODONE BITARTRATE AND ACETAMINOPHEN 1 TABLET: 5; 325 TABLET ORAL at 10:24

## 2018-03-17 RX ADMIN — HEPARIN SODIUM 5000 UNITS: 5000 INJECTION, SOLUTION INTRAVENOUS; SUBCUTANEOUS at 20:34

## 2018-03-17 RX ADMIN — INSULIN LISPRO 5 UNITS: 100 INJECTION, SOLUTION INTRAVENOUS; SUBCUTANEOUS at 12:40

## 2018-03-17 RX ADMIN — Medication 10 ML: at 06:05

## 2018-03-17 RX ADMIN — INSULIN LISPRO 5 UNITS: 100 INJECTION, SOLUTION INTRAVENOUS; SUBCUTANEOUS at 16:50

## 2018-03-17 RX ADMIN — HEPARIN SODIUM 5000 UNITS: 5000 INJECTION, SOLUTION INTRAVENOUS; SUBCUTANEOUS at 04:04

## 2018-03-17 RX ADMIN — Medication 16 G: at 21:05

## 2018-03-17 RX ADMIN — AMLODIPINE BESYLATE 5 MG: 5 TABLET ORAL at 08:22

## 2018-03-17 RX ADMIN — NYSTATIN: 100000 POWDER TOPICAL at 12:45

## 2018-03-17 RX ADMIN — SODIUM PHOSPHATE, MONOBASIC, MONOHYDRATE AND SODIUM PHOSPHATE, DIBASIC ANHYDROUS 15 MEQ: 276; 142 INJECTION, SOLUTION INTRAVENOUS at 12:45

## 2018-03-17 RX ADMIN — NYSTATIN: 100000 POWDER TOPICAL at 17:18

## 2018-03-17 RX ADMIN — HEPARIN SODIUM 5000 UNITS: 5000 INJECTION, SOLUTION INTRAVENOUS; SUBCUTANEOUS at 12:40

## 2018-03-17 RX ADMIN — PREDNISONE 30 MG: 20 TABLET ORAL at 16:50

## 2018-03-17 RX ADMIN — HYDROCODONE BITARTRATE AND ACETAMINOPHEN 1 TABLET: 5; 325 TABLET ORAL at 15:56

## 2018-03-17 RX ADMIN — INSULIN LISPRO 5 UNITS: 100 INJECTION, SOLUTION INTRAVENOUS; SUBCUTANEOUS at 08:23

## 2018-03-17 RX ADMIN — INSULIN LISPRO 3 UNITS: 100 INJECTION, SOLUTION INTRAVENOUS; SUBCUTANEOUS at 12:39

## 2018-03-17 RX ADMIN — ASPIRIN 81 MG: 81 TABLET, COATED ORAL at 08:21

## 2018-03-17 RX ADMIN — INSULIN LISPRO 3 UNITS: 100 INJECTION, SOLUTION INTRAVENOUS; SUBCUTANEOUS at 16:49

## 2018-03-17 RX ADMIN — INSULIN LISPRO 3 UNITS: 100 INJECTION, SOLUTION INTRAVENOUS; SUBCUTANEOUS at 08:22

## 2018-03-17 RX ADMIN — LEVOTHYROXINE SODIUM 150 MCG: 150 TABLET ORAL at 08:22

## 2018-03-17 RX ADMIN — Medication 10 ML: at 22:00

## 2018-03-17 NOTE — PROGRESS NOTES
Renal-pt seen/examined. ANN improving, electrolytes improving.  Will see again upon request.  Gem Brooke MD

## 2018-03-17 NOTE — PROGRESS NOTES
Hospitalist Progress Note    NAME: Kieran Gonzales   :  1960   MRN:  [id number]       Assessment / Plan:  Transfer to medical bed    Right Foot Pain, suspect gout flare: tender to touch, Uric acid level 7.7 (expect to be low with gout flare); patient unable to stand  -as much as I don't want to do it I have Rx'd Prednisone (30 mg daily x3d) for this and a one time dose of colchicine  -will not use recurrent doses of colchicine with patient's renal function  -avoid NSAIDs  -PT recommending SNF thus will also consult OT    Uncontrolled type 2 diabetes with DKA: DKA rsolved  -s/p insulin gtt, Increase lantus to 12 units nightly  -Humalog 5 units TID AC and SSI reordered for higher intensity scale    Acute kidney injury on chronic kidney disease stage III:  -discontinue IVF's since renal function improving  -hold nephrotoxins as well s home diuretic and cozaar  -3/16 renal ultrasound on 3/16 with small kidneys and no other abnormalities  -Nephrology consult appreciated  -continue to monitor renal function    Leukocytosis: likely reactive, AF, UA and CXR wnl, BCx's pending  -Leukocytosis down-trending    Acute Pancreatitis: POA; resolved; suspect relation to uncontrolled BG  Intractable N/V: from DKA, resolved  GERD:  -tolerating diet  -pepcid  -get RUQ ultrasound in case a biliary issue caused pancreatitis that subsequently contributed to DKA; hopefully body habitus does not limit study    Chronic venous stasis:  -discontinue Norvasc    Hypothyroidism:  -TSH wnl, continue home levothyroxine    Hypertension:  -discontinue Norvasc as above  -hold home Cozaar as above  -prn IV hydralazine    Tobacco use and suspected COPD  -prn nicotine patch    Intellectual Disability: Patient does not have capacity to make medical decisions based on Evaluation by Dr. Venson Mcardle on 3/16    Morbid Obesity: Body mass index is 44.02 kg/(m^2).     CODE STATUS: Full Code    DVT prophylaxis: sq heparin   GI Prophylaxis: pepcid asabove  Baseline: independent         Subjective:     Chief Complaint / Reason for Physician Visit: follow-up DKA  Patient seen for follow-up  DKA resolved, patient only complaining of Right foot pain that is acute onset and new since yesterday  She states that she can not stand  Denies other complaints    Review of Systems:  Symptom Y/N Comments  Symptom Y/N Comments   Fever/Chills n   Chest Pain n    Poor Appetite    Edema y chronic   Cough    Abdominal Pain n    Sputum    Joint Pain     SOB/RAMIREZ n   Pruritis/Rash     Nausea/vomit    Tolerating PT/OT     Diarrhea    Tolerating Diet y    Constipation    Other       Could NOT obtain due to:      Objective:     VITALS:   Last 24hrs VS reviewed since prior progress note. Most recent are:  Patient Vitals for the past 24 hrs:   Temp Pulse Resp BP SpO2   03/17/18 0707 97.6 °F (36.4 °C) 66 18 147/76 92 %   03/17/18 0309 97.7 °F (36.5 °C) 73 18 135/67 95 %   03/16/18 2208 98.1 °F (36.7 °C) 65 18 155/71 94 %   03/16/18 1902 98.4 °F (36.9 °C) 71 18 151/69 97 %   03/16/18 1531 98 °F (36.7 °C) 63 19 155/73 100 %   03/16/18 1409 - 94 - 146/72 97 %   03/16/18 1113 98.2 °F (36.8 °C) 65 18 113/52 99 %       Intake/Output Summary (Last 24 hours) at 03/17/18 1032  Last data filed at 03/17/18 0309   Gross per 24 hour   Intake          1212.83 ml   Output              800 ml   Net           412.83 ml        PHYSICAL EXAM:  General: WD, WN. Alert, cooperative, no acute distress    EENT:  EOMI. Anicteric sclerae. MM dry  Resp:  CTA bilaterally, no wheezing or rales. No accessory muscle use  CV:  Regular  rhythm,  No edema  GI:  Soft, Non distended but increased girth secondary to body habitus, non-tender.  +Bowel sounds  Neurologic:  Alert and oriented, monotone speech   Psych:   Poor insight. Not anxious nor agitated, flat affect  Skin:  No rashes.   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, toes normal, foot very sensitive to palpation (evening pulling sock off), no tenderness of left foot    Reviewed most current lab test results and cultures  YES  Reviewed most current radiology test results   YES  Review and summation of old records today    NO  Reviewed patient's current orders and MAR    YES  PMH/SH reviewed - no change compared to H&P  ________________________________________________________________________  Care Plan discussed with:    Comments   Patient x    Family      RN x    Care Manager     Consultant                        Multidiciplinary team rounds were held today with , nursing, pharmacist and clinical coordinator. Patient's plan of care was discussed; medications were reviewed and discharge planning was addressed. ________________________________________________________________________  Total NON critical care TIME:  30   Minutes    Total CRITICAL CARE TIME Spent:   Minutes non procedure based      Comments   >50% of visit spent in counseling and coordination of care     ________________________________________________________________________  Niko Martinez MD     Procedures: see electronic medical records for all procedures/Xrays and details which were not copied into this note but were reviewed prior to creation of Plan. LABS:  I reviewed today's most current labs and imaging studies.   Pertinent labs include:  Recent Labs      03/16/18   0136  03/15/18   1045   WBC  13.3*  14.0*   HGB  12.6  14.1   HCT  41.3  46.9   PLT  245  298     Recent Labs      03/17/18   0248  03/16/18   1530  03/16/18   0950   03/16/18   0136  03/15/18   1725  03/15/18   1045   NA  135*  135*  137   < >  135*  133*  133*   K  3.7  3.6  3.6   < >  4.4  3.8  4.4   CL  102  100  102   < >  98  94*  87*   CO2  27  27  28   < >  21  19*  10*   GLU  228*  159*  158*   < >  260*  324*  680*   BUN  31*  44*  46*   < >  48*  43*  41*   CREA  1.86*  2.64*  2.63*   < >  2.76*  2.48*  2.49*   CA  8.2*  8.4*  8.0*   < >  7.7*  7.7*  9.0   MG  1.7   --    --    --   1.6   --   1.8 PHOS  2.3*   --    --    --   3.7   --    --    ALB  2.9*   --    --    --   3.6  3.6  4.0   TBILI  0.5   --    --    --   0.8  0.6  0.8   SGOT  13*   --    --    --   8*  9*  6*   ALT  13   --    --    --   11*  11*  15    < > = values in this interval not displayed.        Signed: Stephanie Hassan MD

## 2018-03-17 NOTE — PROGRESS NOTES
TRANSFER - OUT REPORT:    Verbal report given to Sujata(name) on Marine Choudhary  being transferred to Gen Surg(unit) for routine progression of care       Report consisted of patients Situation, Background, Assessment and   Recommendations(SBAR). Information from the following report(s) SBAR, [facility], [facility] and Recent Results was reviewed with the receiving nurse. Lines:   Peripheral IV 03/15/18 Right Antecubital (Active)   Site Assessment Clean, dry, & intact 3/17/2018  3:46 PM   Phlebitis Assessment 0 3/17/2018  3:46 PM   Infiltration Assessment 0 3/17/2018  3:46 PM   Dressing Status Clean, dry, & intact 3/17/2018  3:46 PM   Dressing Type Tape;Transparent 3/17/2018  3:46 PM   Hub Color/Line Status Pink;Capped 3/17/2018  3:46 PM   Action Taken Open ports on tubing capped 3/17/2018  4:17 AM   Alcohol Cap Used Yes 3/17/2018  7:15 AM        Opportunity for questions and clarification was provided.       Patient transported with:   Monitor  O2 @ 2 liters  Registered Nurse

## 2018-03-18 ENCOUNTER — APPOINTMENT (OUTPATIENT)
Dept: ULTRASOUND IMAGING | Age: 58
DRG: 637 | End: 2018-03-18
Attending: INTERNAL MEDICINE
Payer: MEDICARE

## 2018-03-18 LAB
BASOPHILS # BLD: 0 K/UL (ref 0–0.1)
BASOPHILS NFR BLD: 0 % (ref 0–1)
DIFFERENTIAL METHOD BLD: ABNORMAL
EOSINOPHIL # BLD: 0 K/UL (ref 0–0.4)
EOSINOPHIL NFR BLD: 0 % (ref 0–7)
ERYTHROCYTE [DISTWIDTH] IN BLOOD BY AUTOMATED COUNT: 15.8 % (ref 11.5–14.5)
GLUCOSE BLD STRIP.AUTO-MCNC: 229 MG/DL (ref 65–100)
GLUCOSE BLD STRIP.AUTO-MCNC: 231 MG/DL (ref 65–100)
GLUCOSE BLD STRIP.AUTO-MCNC: 401 MG/DL (ref 65–100)
GLUCOSE BLD STRIP.AUTO-MCNC: 414 MG/DL (ref 65–100)
HCT VFR BLD AUTO: 39.7 % (ref 35–47)
HGB BLD-MCNC: 12.4 G/DL (ref 11.5–16)
IMM GRANULOCYTES # BLD: 0.1 K/UL (ref 0–0.04)
IMM GRANULOCYTES NFR BLD AUTO: 1 % (ref 0–0.5)
LYMPHOCYTES # BLD: 0.6 K/UL (ref 0.8–3.5)
LYMPHOCYTES NFR BLD: 10 % (ref 12–49)
MCH RBC QN AUTO: 24.6 PG (ref 26–34)
MCHC RBC AUTO-ENTMCNC: 31.2 G/DL (ref 30–36.5)
MCV RBC AUTO: 78.8 FL (ref 80–99)
MONOCYTES # BLD: 0.1 K/UL (ref 0–1)
MONOCYTES NFR BLD: 2 % (ref 5–13)
NEUTS SEG # BLD: 4.7 K/UL (ref 1.8–8)
NEUTS SEG NFR BLD: 87 % (ref 32–75)
NRBC # BLD: 0 K/UL (ref 0–0.01)
NRBC BLD-RTO: 0 PER 100 WBC
PLATELET # BLD AUTO: 137 K/UL (ref 150–400)
PMV BLD AUTO: 11.2 FL (ref 8.9–12.9)
RBC # BLD AUTO: 5.04 M/UL (ref 3.8–5.2)
RBC MORPH BLD: ABNORMAL
RBC MORPH BLD: ABNORMAL
SERVICE CMNT-IMP: ABNORMAL
WBC # BLD AUTO: 5.5 K/UL (ref 3.6–11)

## 2018-03-18 PROCEDURE — 65660000000 HC RM CCU STEPDOWN

## 2018-03-18 PROCEDURE — 74011250637 HC RX REV CODE- 250/637: Performed by: EMERGENCY MEDICINE

## 2018-03-18 PROCEDURE — 74011636637 HC RX REV CODE- 636/637: Performed by: INTERNAL MEDICINE

## 2018-03-18 PROCEDURE — 74011250637 HC RX REV CODE- 250/637: Performed by: INTERNAL MEDICINE

## 2018-03-18 PROCEDURE — 74011250636 HC RX REV CODE- 250/636: Performed by: INTERNAL MEDICINE

## 2018-03-18 PROCEDURE — 82962 GLUCOSE BLOOD TEST: CPT

## 2018-03-18 PROCEDURE — 85025 COMPLETE CBC W/AUTO DIFF WBC: CPT | Performed by: INTERNAL MEDICINE

## 2018-03-18 PROCEDURE — 77010033678 HC OXYGEN DAILY

## 2018-03-18 PROCEDURE — 74011250636 HC RX REV CODE- 250/636: Performed by: EMERGENCY MEDICINE

## 2018-03-18 PROCEDURE — 76705 ECHO EXAM OF ABDOMEN: CPT

## 2018-03-18 PROCEDURE — 36415 COLL VENOUS BLD VENIPUNCTURE: CPT | Performed by: INTERNAL MEDICINE

## 2018-03-18 RX ORDER — SODIUM CHLORIDE 9 MG/ML
75 INJECTION, SOLUTION INTRAVENOUS CONTINUOUS
Status: DISCONTINUED | OUTPATIENT
Start: 2018-03-18 | End: 2018-03-19

## 2018-03-18 RX ORDER — INSULIN LISPRO 100 [IU]/ML
20 INJECTION, SOLUTION INTRAVENOUS; SUBCUTANEOUS ONCE
Status: COMPLETED | OUTPATIENT
Start: 2018-03-18 | End: 2018-03-18

## 2018-03-18 RX ORDER — INSULIN LISPRO 100 [IU]/ML
10 INJECTION, SOLUTION INTRAVENOUS; SUBCUTANEOUS
Status: DISCONTINUED | OUTPATIENT
Start: 2018-03-18 | End: 2018-03-19

## 2018-03-18 RX ORDER — INSULIN LISPRO 100 [IU]/ML
12 INJECTION, SOLUTION INTRAVENOUS; SUBCUTANEOUS ONCE
Status: COMPLETED | OUTPATIENT
Start: 2018-03-18 | End: 2018-03-18

## 2018-03-18 RX ORDER — INSULIN GLARGINE 100 [IU]/ML
20 INJECTION, SOLUTION SUBCUTANEOUS
Status: DISCONTINUED | OUTPATIENT
Start: 2018-03-18 | End: 2018-03-19

## 2018-03-18 RX ADMIN — LEVOTHYROXINE SODIUM 150 MCG: 150 TABLET ORAL at 06:41

## 2018-03-18 RX ADMIN — INSULIN LISPRO 5 UNITS: 100 INJECTION, SOLUTION INTRAVENOUS; SUBCUTANEOUS at 12:41

## 2018-03-18 RX ADMIN — HEPARIN SODIUM 5000 UNITS: 5000 INJECTION, SOLUTION INTRAVENOUS; SUBCUTANEOUS at 04:13

## 2018-03-18 RX ADMIN — Medication 10 ML: at 22:17

## 2018-03-18 RX ADMIN — NYSTATIN: 100000 POWDER TOPICAL at 09:00

## 2018-03-18 RX ADMIN — METOCLOPRAMIDE 5 MG: 5 INJECTION, SOLUTION INTRAMUSCULAR; INTRAVENOUS at 20:17

## 2018-03-18 RX ADMIN — SODIUM CHLORIDE 75 ML/HR: 900 INJECTION, SOLUTION INTRAVENOUS at 09:37

## 2018-03-18 RX ADMIN — INSULIN LISPRO 5 UNITS: 100 INJECTION, SOLUTION INTRAVENOUS; SUBCUTANEOUS at 09:35

## 2018-03-18 RX ADMIN — ASPIRIN 81 MG: 81 TABLET, COATED ORAL at 09:34

## 2018-03-18 RX ADMIN — HYDROCODONE BITARTRATE AND ACETAMINOPHEN 1 TABLET: 5; 325 TABLET ORAL at 20:17

## 2018-03-18 RX ADMIN — INSULIN LISPRO 4 UNITS: 100 INJECTION, SOLUTION INTRAVENOUS; SUBCUTANEOUS at 19:10

## 2018-03-18 RX ADMIN — DIPHENHYDRAMINE HYDROCHLORIDE 25 MG: 25 CAPSULE ORAL at 20:16

## 2018-03-18 RX ADMIN — INSULIN GLARGINE 20 UNITS: 100 INJECTION, SOLUTION SUBCUTANEOUS at 22:16

## 2018-03-18 RX ADMIN — FAMOTIDINE 20 MG: 20 TABLET, FILM COATED ORAL at 06:41

## 2018-03-18 RX ADMIN — INSULIN LISPRO 12 UNITS: 100 INJECTION, SOLUTION INTRAVENOUS; SUBCUTANEOUS at 09:34

## 2018-03-18 RX ADMIN — NYSTATIN: 100000 POWDER TOPICAL at 19:09

## 2018-03-18 RX ADMIN — INSULIN LISPRO 10 UNITS: 100 INJECTION, SOLUTION INTRAVENOUS; SUBCUTANEOUS at 19:09

## 2018-03-18 RX ADMIN — INSULIN LISPRO 20 UNITS: 100 INJECTION, SOLUTION INTRAVENOUS; SUBCUTANEOUS at 12:41

## 2018-03-18 RX ADMIN — PREDNISONE 30 MG: 20 TABLET ORAL at 09:34

## 2018-03-18 RX ADMIN — Medication 10 ML: at 06:41

## 2018-03-18 NOTE — PROGRESS NOTES
Hospitalist Progress Note    NAME: Rhea Phipps   :  1960   MRN:  [id number]       Assessment / Plan:  Right Foot Pain, Acute Gout flare: tender to touch, Uric acid level 7.7 (expect to be low with gout flare); patient unable to stand on 3/17  -continue Prednisone, hopefully can stop tomorrow  -await interval eval from PT/OT    Uncontrolled type 2 diabetes with DKA: DKA resolved and now with high glucose because of Prednisone (see above)  -s/p insulin gtt  -Glucose bad on Prednisone, will increase Lantus to 20 units tonight and Increase Humalog to 10 units TID meals  -IVF's restarted    Acute kidney injury on chronic kidney disease stage III:  -renal function continues to improve  -hold nephrotoxins as well as home diuretic and cozaar  -3/16 renal ultrasound on 3/16 with small kidneys and no other abnormalities  -Nephrology consult appreciated  -continue to monitor renal function    Leukocytosis: likely reactive, AF, UA and CXR wnl, BCx's pending  -Leukocytosis normalized today    Acute Pancreatitis: POA; resolved; suspect relation to uncontrolled BG  Intractable N/V: from DKA, resolved  GERD:  -tolerating diet  -pepcid  -RUQ ultrasound showed, \"Echogenic hepatic parenchyma as described above. No biliary dilatation or gallstones. \"    Thrombocytopenia: sq heparin discontinued on 3/18 as platelets decreased from 245 to 137  -continue to monitor    Chronic venous stasis:  -discontinue Norvasc    Hypothyroidism:  -TSH wnl, continue home levothyroxine    Hypertension:  -discontinue Norvasc as above  -hold home Cozaar as above  -prn IV hydralazine    Tobacco use and suspected COPD  -prn nicotine patch    Intellectual Disability: Patient does not have capacity to make medical decisions based on Evaluation by Dr. Ada Polanco on 3/16    Morbid Obesity: Body mass index is 44.02 kg/(m^2).     CODE STATUS: Full Code    DVT prophylaxis: sq heparin discontinued on 3/18 as platelets decreased from 245 to 137    GI Prophylaxis: pepcid asabove  Baseline: independent         Subjective:     Chief Complaint / Reason for Physician Visit: follow-up DKA  Patient seen for follow-up  Able to place weight on foot today  Glucose elevated    Review of Systems:  Symptom Y/N Comments  Symptom Y/N Comments   Fever/Chills n   Chest Pain n    Poor Appetite n   Edema y chronic   Cough    Abdominal Pain n    Sputum    Joint Pain     SOB/RAMIREZ n   Pruritis/Rash     Nausea/vomit    Tolerating PT/OT     Diarrhea    Tolerating Diet y    Constipation    Other       Could NOT obtain due to:      Objective:     VITALS:   Last 24hrs VS reviewed since prior progress note. Most recent are:  Patient Vitals for the past 24 hrs:   Temp Pulse Resp BP SpO2   03/18/18 0730 (P) 97.8 °F (36.6 °C) (P) 69 (P) 14 (P) 142/72 (P) 92 %   03/18/18 0420 96.4 °F (35.8 °C) 62 16 151/85 92 %   03/18/18 0056 98 °F (36.7 °C) 63 16 140/76 92 %   03/17/18 2100 98.1 °F (36.7 °C) 62 16 141/82 92 %   03/17/18 1546 98.1 °F (36.7 °C) 60 18 114/46 98 %     No intake or output data in the 24 hours ending 03/18/18 1051     PHYSICAL EXAM:  General: WD, WN. Alert, cooperative, no acute distress    EENT:  EOMI. Anicteric sclerae. MM dry  Resp:  CTA bilaterally, no wheezing or rales. No accessory muscle use  CV:  Regular  rhythm,  No edema  GI:  Soft, Non distended but increased girth secondary to body habitus, non-tender.  +Bowel sounds  Neurologic:  Alert and oriented, monotone speech   Psych:   Poor insight. Not anxious nor agitated, flat affect  Skin:  No rashes.   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, toes normal, foot less sensitive to palpation    Reviewed most current lab test results and cultures  YES  Reviewed most current radiology test results   YES  Review and summation of old records today    NO  Reviewed patient's current orders and MAR    YES  PMH/SH reviewed - no change compared to H&P  ________________________________________________________________________  Care Plan discussed with:    Comments   Patient x    Family  x Venkatesh Roman RN x    Care Manager     Consultant                        Multidiciplinary team rounds were held today with , nursing, pharmacist and clinical coordinator. Patient's plan of care was discussed; medications were reviewed and discharge planning was addressed. ________________________________________________________________________  Total NON critical care TIME: 25  Minutes    Total CRITICAL CARE TIME Spent:   Minutes non procedure based      Comments   >50% of visit spent in counseling and coordination of care     ________________________________________________________________________  Lin Noriega MD     Procedures: see electronic medical records for all procedures/Xrays and details which were not copied into this note but were reviewed prior to creation of Plan. LABS:  I reviewed today's most current labs and imaging studies. Pertinent labs include:  Recent Labs      03/18/18   0414  03/16/18   0136   WBC  5.5  13.3*   HGB  12.4  12.6   HCT  39.7  41.3   PLT  137*  245     Recent Labs      03/17/18   0248  03/16/18   1530  03/16/18   0950   03/16/18   0136  03/15/18   1725   NA  135*  135*  137   < >  135*  133*   K  3.7  3.6  3.6   < >  4.4  3.8   CL  102  100  102   < >  98  94*   CO2  27  27  28   < >  21  19*   GLU  228*  159*  158*   < >  260*  324*   BUN  31*  44*  46*   < >  48*  43*   CREA  1.86*  2.64*  2.63*   < >  2.76*  2.48*   CA  8.2*  8.4*  8.0*   < >  7.7*  7.7*   MG  1.7   --    --    --   1.6   --    PHOS  2.3*   --    --    --   3.7   --    ALB  2.9*   --    --    --   3.6  3.6   TBILI  0.5   --    --    --   0.8  0.6   SGOT  13*   --    --    --   8*  9*   ALT  13   --    --    --   11*  11*    < > = values in this interval not displayed.        Signed: Lin Noriega MD

## 2018-03-18 NOTE — PROGRESS NOTES
Lab called and stated a redraw on chemistry for this AM was needed, attempted x 2 without success will have oncoming nurse attempt.

## 2018-03-19 LAB
ALBUMIN SERPL-MCNC: 2.4 G/DL (ref 3.5–5)
ALBUMIN/GLOB SERPL: 0.6 {RATIO} (ref 1.1–2.2)
ALP SERPL-CCNC: 112 U/L (ref 45–117)
ALT SERPL-CCNC: 16 U/L (ref 12–78)
ANION GAP SERPL CALC-SCNC: 8 MMOL/L (ref 5–15)
AST SERPL-CCNC: 20 U/L (ref 15–37)
BASOPHILS # BLD: 0 K/UL (ref 0–0.1)
BASOPHILS NFR BLD: 0 % (ref 0–1)
BILIRUB SERPL-MCNC: 0.7 MG/DL (ref 0.2–1)
BUN SERPL-MCNC: 18 MG/DL (ref 6–20)
BUN/CREAT SERPL: 17 (ref 12–20)
CALCIUM SERPL-MCNC: 8.7 MG/DL (ref 8.5–10.1)
CHLORIDE SERPL-SCNC: 100 MMOL/L (ref 97–108)
CO2 SERPL-SCNC: 23 MMOL/L (ref 21–32)
CREAT SERPL-MCNC: 1.03 MG/DL (ref 0.55–1.02)
DIFFERENTIAL METHOD BLD: ABNORMAL
EOSINOPHIL # BLD: 0 K/UL (ref 0–0.4)
EOSINOPHIL NFR BLD: 0 % (ref 0–7)
ERYTHROCYTE [DISTWIDTH] IN BLOOD BY AUTOMATED COUNT: 15.8 % (ref 11.5–14.5)
GLOBULIN SER CALC-MCNC: 3.8 G/DL (ref 2–4)
GLUCOSE BLD STRIP.AUTO-MCNC: 236 MG/DL (ref 65–100)
GLUCOSE BLD STRIP.AUTO-MCNC: 280 MG/DL (ref 65–100)
GLUCOSE BLD STRIP.AUTO-MCNC: 310 MG/DL (ref 65–100)
GLUCOSE BLD STRIP.AUTO-MCNC: 377 MG/DL (ref 65–100)
GLUCOSE SERPL-MCNC: 234 MG/DL (ref 65–100)
HCT VFR BLD AUTO: 44.2 % (ref 35–47)
HGB BLD-MCNC: 13.4 G/DL (ref 11.5–16)
IMM GRANULOCYTES # BLD: 0 K/UL (ref 0–0.04)
IMM GRANULOCYTES NFR BLD AUTO: 1 % (ref 0–0.5)
LYMPHOCYTES # BLD: 0.8 K/UL (ref 0.8–3.5)
LYMPHOCYTES NFR BLD: 15 % (ref 12–49)
MCH RBC QN AUTO: 24.7 PG (ref 26–34)
MCHC RBC AUTO-ENTMCNC: 30.3 G/DL (ref 30–36.5)
MCV RBC AUTO: 81.4 FL (ref 80–99)
MONOCYTES # BLD: 0.5 K/UL (ref 0–1)
MONOCYTES NFR BLD: 10 % (ref 5–13)
NEUTS SEG # BLD: 4 K/UL (ref 1.8–8)
NEUTS SEG NFR BLD: 74 % (ref 32–75)
NRBC # BLD: 0 K/UL (ref 0–0.01)
NRBC BLD-RTO: 0 PER 100 WBC
PLATELET # BLD AUTO: 113 K/UL (ref 150–400)
PMV BLD AUTO: 10.8 FL (ref 8.9–12.9)
POTASSIUM SERPL-SCNC: 4.4 MMOL/L (ref 3.5–5.1)
PROT SERPL-MCNC: 6.2 G/DL (ref 6.4–8.2)
RBC # BLD AUTO: 5.43 M/UL (ref 3.8–5.2)
SERVICE CMNT-IMP: ABNORMAL
SODIUM SERPL-SCNC: 131 MMOL/L (ref 136–145)
WBC # BLD AUTO: 5.3 K/UL (ref 3.6–11)

## 2018-03-19 PROCEDURE — 97116 GAIT TRAINING THERAPY: CPT

## 2018-03-19 PROCEDURE — G8988 SELF CARE GOAL STATUS: HCPCS

## 2018-03-19 PROCEDURE — 97535 SELF CARE MNGMENT TRAINING: CPT

## 2018-03-19 PROCEDURE — G8987 SELF CARE CURRENT STATUS: HCPCS

## 2018-03-19 PROCEDURE — 74011636637 HC RX REV CODE- 636/637: Performed by: INTERNAL MEDICINE

## 2018-03-19 PROCEDURE — 74011250637 HC RX REV CODE- 250/637: Performed by: INTERNAL MEDICINE

## 2018-03-19 PROCEDURE — 82962 GLUCOSE BLOOD TEST: CPT

## 2018-03-19 PROCEDURE — 97165 OT EVAL LOW COMPLEX 30 MIN: CPT

## 2018-03-19 PROCEDURE — 65660000000 HC RM CCU STEPDOWN

## 2018-03-19 PROCEDURE — 80053 COMPREHEN METABOLIC PANEL: CPT | Performed by: INTERNAL MEDICINE

## 2018-03-19 PROCEDURE — 85025 COMPLETE CBC W/AUTO DIFF WBC: CPT | Performed by: INTERNAL MEDICINE

## 2018-03-19 PROCEDURE — 36415 COLL VENOUS BLD VENIPUNCTURE: CPT | Performed by: INTERNAL MEDICINE

## 2018-03-19 PROCEDURE — 74011250637 HC RX REV CODE- 250/637: Performed by: EMERGENCY MEDICINE

## 2018-03-19 RX ORDER — INSULIN GLARGINE 100 [IU]/ML
10 INJECTION, SOLUTION SUBCUTANEOUS
Status: DISCONTINUED | OUTPATIENT
Start: 2018-03-19 | End: 2018-03-20 | Stop reason: HOSPADM

## 2018-03-19 RX ORDER — INSULIN LISPRO 100 [IU]/ML
5 INJECTION, SOLUTION INTRAVENOUS; SUBCUTANEOUS
Status: DISCONTINUED | OUTPATIENT
Start: 2018-03-19 | End: 2018-03-20 | Stop reason: HOSPADM

## 2018-03-19 RX ADMIN — INSULIN LISPRO 4 UNITS: 100 INJECTION, SOLUTION INTRAVENOUS; SUBCUTANEOUS at 13:08

## 2018-03-19 RX ADMIN — PREDNISONE 30 MG: 20 TABLET ORAL at 07:56

## 2018-03-19 RX ADMIN — Medication 10 ML: at 05:59

## 2018-03-19 RX ADMIN — NYSTATIN: 100000 POWDER TOPICAL at 07:57

## 2018-03-19 RX ADMIN — HYDROCODONE BITARTRATE AND ACETAMINOPHEN 1 TABLET: 5; 325 TABLET ORAL at 22:01

## 2018-03-19 RX ADMIN — INSULIN LISPRO 12 UNITS: 100 INJECTION, SOLUTION INTRAVENOUS; SUBCUTANEOUS at 18:45

## 2018-03-19 RX ADMIN — INSULIN LISPRO 5 UNITS: 100 INJECTION, SOLUTION INTRAVENOUS; SUBCUTANEOUS at 13:08

## 2018-03-19 RX ADMIN — INSULIN LISPRO 5 UNITS: 100 INJECTION, SOLUTION INTRAVENOUS; SUBCUTANEOUS at 22:01

## 2018-03-19 RX ADMIN — INSULIN LISPRO 7 UNITS: 100 INJECTION, SOLUTION INTRAVENOUS; SUBCUTANEOUS at 08:08

## 2018-03-19 RX ADMIN — NYSTATIN: 100000 POWDER TOPICAL at 18:42

## 2018-03-19 RX ADMIN — LEVOTHYROXINE SODIUM 150 MCG: 150 TABLET ORAL at 05:59

## 2018-03-19 RX ADMIN — INSULIN LISPRO 5 UNITS: 100 INJECTION, SOLUTION INTRAVENOUS; SUBCUTANEOUS at 18:41

## 2018-03-19 RX ADMIN — ASPIRIN 81 MG: 81 TABLET, COATED ORAL at 07:56

## 2018-03-19 RX ADMIN — FAMOTIDINE 20 MG: 20 TABLET, FILM COATED ORAL at 05:59

## 2018-03-19 RX ADMIN — INSULIN LISPRO 10 UNITS: 100 INJECTION, SOLUTION INTRAVENOUS; SUBCUTANEOUS at 08:07

## 2018-03-19 NOTE — PROGRESS NOTES
Problem: Mobility Impaired (Adult and Pediatric)  Goal: *Acute Goals and Plan of Care (Insert Text)  Physical Therapy Goals  Initiated 3/16/2018  1. Patient will move from supine to sit and sit to supine , scoot up and down and roll side to side in bed with supervision/set-up within 7 day(s). 2.  Patient will transfer from bed to chair and chair to bed with supervision/set-up using the least restrictive device within 7 day(s). 3.  Patient will perform sit to stand with supervision/set-up within 7 day(s). 4.  Patient will ambulate with supervision/set-up for 200 feet with the least restrictive device within 7 day(s). 5.  Patient will ascend/descend 5 stairs with 1 handrail(s) with supervision/set-up within 7 day(s). physical Therapy TREATMENT  Patient: Azul Chang (58 y.o. female)  Date: 3/19/2018  Diagnosis: DKA (diabetic ketoacidoses) ([facility] [address]  ANN (acute kidney injury) ([facility] [address] <principal problem not specified>       Precautions:  Falls  Chart, physical therapy assessment, plan of care and goals were reviewed. ASSESSMENT:  Patient received standing in room with RW. Patient agreeable to therapy. Patient ambulated with RW with CGA demonstrating decreased karoline, wide CHRIS, minor path deviations. Patient with reports of L hip pain and gout in bilateral feet impacting mobility at this time. Patient requesting to go back bed at end of ambulation. Patient able to complete sit to supine position with standby assist and increased time and effort. Patient will continue to benefit from PT to progress mobility, improve tolerance to activity and reach highest level of independence. Patient will benefit from SNF upon discharge for further mobility efforts.    Progression toward goals:  [x]    Improving appropriately and progressing toward goals  [x]    Improving slowly and progressing toward goals  []    Not making progress toward goals and plan of care will be adjusted     PLAN:  Patient continues to benefit from skilled intervention to address the above impairments. Continue treatment per established plan of care. Discharge Recommendations:  Skilled Nursing Facility  Further Equipment Recommendations for Discharge:  None at this time     SUBJECTIVE:   Patient stated I have been up since 10 AM, I am ready to get back to bed.     OBJECTIVE DATA SUMMARY:   Critical Behavior:  Neurologic State: Alert, Appropriate for age  Orientation Level: Oriented X4  Cognition: Appropriate safety awareness     Functional Mobility Training:  Bed Mobility:        Sit to Supine: Stand-by assistance           Transfers:  Sit to Stand: Modified independent                                Balance:  Sitting: Intact  Standing: Intact  Standing - Static: Good  Standing - Dynamic : Good  Ambulation/Gait Training:  Distance (ft): 100 Feet (ft)  Assistive Device: Gait belt;Walker, rolling  Ambulation - Level of Assistance: Contact guard assistance        Gait Abnormalities: Decreased step clearance;Trunk sway increased; Path deviations        Base of Support: Widened     Speed/Diana: Slow;Pace decreased (<100 feet/min)  Step Length: Left shortened;Right shortened       Pain:  Pain Scale 1: Numeric (0 - 10)  Pain Intensity 1: 0              Activity Tolerance:   Fair. VSS  Please refer to the flowsheet for vital signs taken during this treatment.   After treatment:   []    Patient left in no apparent distress sitting up in chair  [x]    Patient left in no apparent distress in bed  [x]    Call bell left within reach  [x]    Nursing notified  []    Caregiver present  []    Bed alarm activated    COMMUNICATION/COLLABORATION:   The patients plan of care was discussed with: Registered Nurse    Reinaldo Courtney, PT, DPT   Time Calculation: 15 mins

## 2018-03-19 NOTE — WOUND CARE
Wound care nurse evaluated patient's legs and there are no open wounds. Apparently pt. Receives compression therapy at home but not for wounds. Here we do it for wounds that need to be healed in the setting of weeping edema or excessive drainage. Pt. To return to compression at home upon discharge at the lymphedema clinic.    Ellen Simons RN, BSN, [employer]

## 2018-03-19 NOTE — PROGRESS NOTES
Hospitalist Progress Note    NAME: Dusty Mcdowell   :  1960   MRN:  [id number]       Assessment / Plan:  Right Foot Pain, Acute Gout flare: tender to touch, Uric acid level 7.7 (expect to be low with gout flare); patient unable to stand on 3/17  -completed prednisone, pain better  -PT/OT recommended SNF but pt and sister want her to have [facility]; will arrange [facility] PT/OT/SN and Aide    Uncontrolled type 2 diabetes with DKA: DKA resolved and now with high glucose because of Prednisone (see above)  -s/p insulin gtt  -since prednisone stopped will decrease lantus back to 10 units and mealtime insulin back to 5 units TID meals    Acute kidney injury on chronic kidney disease stage III:  -renal function improved  -continue hold nephrotoxins as well as home diuretic and cozaar  -3/16 renal ultrasound on 3/16 with small kidneys and no other abnormalities  -Nephrology consult appreciated  -continue to monitor renal function  -IVF's stopped again    Leukocytosis: likely reactive, AF, UA and CXR wnl  -Leukocytosis normalized    Acute Pancreatitis: POA; resolved; suspect relation to uncontrolled BG  Intractable N/V: from DKA, resolved  GERD:  -tolerating diet  -pepcid  -RUQ ultrasound showed, \"Echogenic hepatic parenchyma as described above. No biliary dilatation or gallstones. \"    Thrombocytopenia: sq heparin discontinued on 3/18 as platelets decreased from 245 to 137 to 113K  -continue to monitor    Chronic venous stasis:  -discontinue Norvasc    Hypothyroidism:  -TSH wnl, continue home levothyroxine    Hypertension: BP acceptable at this time, should improve further since IVF's again stopped  -discontinue Norvasc as above  -hold home Cozaar as above  -prn IV hydralazine    Tobacco use and suspected COPD  -prn nicotine patch    Intellectual Disability: Patient does not have capacity to make medical decisions based on Evaluation by Dr. Andres Dyson on 3/16    Morbid Obesity: Body mass index is 44.02 kg/(m^2).     CODE STATUS: Full Code    DVT prophylaxis: sq heparin discontinued on 3/18 as platelets decreased from 245 to 137     GI Prophylaxis: pepcid asabove  Baseline: independent         Subjective:     Chief Complaint / Reason for Physician Visit: follow-up DKA  Patient seen for follow-up  Pain better  BG trends acceptable  Seen prior to interval therapy eval  Case discussed with RN  Case discussed with sister, Ms Luann Thompson, this evening and she saw patient ambulate yesterday and would feel more comfortable with HH than SNF  Patient has sitter from 8 am to 1 pm and has a good support system, it can be arranged for her to have an afternoon sitter as well per my discussion with Ms Luann Thompson    Review of Systems:  Symptom Y/N Comments  Symptom Y/N Comments   Fever/Chills    Chest Pain n    Poor Appetite n   Edema     Cough    Abdominal Pain n    Sputum    Joint Pain     SOB/RAMIREZ n   Pruritis/Rash     Nausea/vomit    Tolerating PT/OT     Diarrhea n   Tolerating Diet y    Constipation    Other       Could NOT obtain due to:      Objective:     VITALS:   Last 24hrs VS reviewed since prior progress note. Most recent are:  Patient Vitals for the past 24 hrs:   Temp Pulse Resp BP SpO2   03/19/18 1115 98 °F (36.7 °C) 63 18 139/80 92 %   03/19/18 0725 97.6 °F (36.4 °C) (!) 57 16 (!) 155/107 98 %   03/19/18 0105 98.2 °F (36.8 °C) 71 16 147/61 97 %   03/18/18 2022 98 °F (36.7 °C) 69 16 143/74 98 %       Intake/Output Summary (Last 24 hours) at 03/19/18 1418  Last data filed at 03/19/18 1021   Gross per 24 hour   Intake           1542.5 ml   Output             1000 ml   Net            542.5 ml        PHYSICAL EXAM:  General: WD, WN. Alert, cooperative, no acute distress    EENT:  EOMI. Anicteric sclerae. MM dry  Resp:  CTA bilaterally, no wheezing or rales.   No accessory muscle use  CV:  Regular  Rhythm,  no significant edema  GI:  Soft, Non distended but increased girth secondary to body habitus, non-tender.  +Bowel sounds  Neurologic:  Alert and oriented, monotone speech   Psych:   Poor insight. Not anxious nor agitated, flat affect  Skin:  No rashes. 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, toes normal, foot less sensitive to palpation    Reviewed most current lab test results and cultures  YES  Reviewed most current radiology test results   YES  Review and summation of old records today    NO  Reviewed patient's current orders and MAR    YES  PMH/SH reviewed - no change compared to H&P  ________________________________________________________________________  Care Plan discussed with:    Comments   Patient x    Family  x Evelia Hook RN x    Care Manager     Consultant                        Multidiciplinary team rounds were held today with , nursing, pharmacist and clinical coordinator. Patient's plan of care was discussed; medications were reviewed and discharge planning was addressed. ________________________________________________________________________  Total NON critical care TIME: 35  Minutes    Total CRITICAL CARE TIME Spent:   Minutes non procedure based      Comments   >50% of visit spent in counseling and coordination of care x dispo planning   ________________________________________________________________________  James Gage MD     Procedures: see electronic medical records for all procedures/Xrays and details which were not copied into this note but were reviewed prior to creation of Plan. LABS:  I reviewed today's most current labs and imaging studies.   Pertinent labs include:  Recent Labs      03/19/18   0515  03/18/18   0414   WBC  5.3  5.5   HGB  13.4  12.4   HCT  44.2  39.7   PLT  113*  137*     Recent Labs      03/19/18   0515  03/17/18   0248  03/16/18   1530   NA  131*  135*  135*   K  4.4  3.7  3.6   CL  100  102  100   CO2  23  27  27   GLU  234*  228*  159*   BUN  18  31*  44*   CREA  1.03*  1.86*  2.64*   CA  8.7  8.2*  8.4*   MG   --   1.7   --    PHOS   --   2.3*   --    ALB  2.4*  2.9*   --    TBILI 0.7  0.5   --    SGOT  20  13*   --    ALT  16  13   --        Signed: Lin Noriega MD

## 2018-03-19 NOTE — PROGRESS NOTES
Problem: Self Care Deficits Care Plan (Adult)  Goal: *Acute Goals and Plan of Care (Insert Text)  Occupational Therapy Goals  Initiated 3/19/2018  1. Patient will perform lower body dressing with modified independence with bariatric sock aid within 7 day(s). 2.  Patient will perform bathing with modified independence with compensatory strategy for posterior area pericare within 7 day(s). 3.  Patient will perform item retrieval for ADL at Rw level with modified independence within 7 day(s). Occupational Therapy EVALUATION  Patient: Byron Pennington (17 y.o. female)  Date: 3/19/2018  Primary Diagnosis: DKA (diabetic ketoacidoses) ([facility] [address]  ANN (acute kidney injury) ([facility] [address]        Precautions:        ASSESSMENT :  Based on the objective data described below, the patient presents with decreased ADL with decreased reach to posterior periarea and distal LE, improving LE pain and slightly decreased ADL, I-ADl. Patient is mod  I in room for toilet and chair transfer today, mod I to stand at sinkw with cues to wash hands after toileting stating \"I never wash my hands\". Patient will benefit from stated goals, resources provided to increase her I with toileting and distal LE dressing tasks. Patient states she was mod I for ADL at home, yet states she required A for donning sock and couldn't cleanse herself after a BM. Gout pain is resolving, per her report, with significant increases in functional mobility. Patient will no currently require any f/u OT after d/c, she has a caregiver at home M-F, 8-1 am.    Patient will benefit from skilled intervention to address the above impairments.   Patients rehabilitation potential is considered to be Good  Factors which may influence rehabilitation potential include:   []             None noted  []             Mental ability/status  []             Medical condition  []             Home/family situation and support systems  []             Safety awareness  []             Pain tolerance/management  [x]             Other: obesity      PLAN :  Recommendations and Planned Interventions:  [x]               Self Care Training                  [x]        Therapeutic Activities  [x]               Functional Mobility Training    []        Cognitive Retraining  [x]               Therapeutic Exercises           [x]        Endurance Activities  [x]               Balance Training                   [x]        Neuromuscular Re-Education  []               Visual/Perceptual Training     [x]   Home Safety Training  [x]               Patient Education                 [x]        Family Training/Education  []               Other (comment):    Frequency/Duration: Patient will be followed by occupational therapy 4 times a week to address goals. Discharge Recommendations: None  Further Equipment Recommendations for Discharge: toilet aid, bariatric sock aid (issued resources to obtain both as well as make a homemade toilet aid). SUBJECTIVE:   Patient stated [place] never wash my hands. .. Oh, I couldn't do that before (rodrigo socks or cleanse after BM). .. That think doesn't work (sock aid).     OBJECTIVE DATA SUMMARY:   HISTORY:   Past Medical History:   Diagnosis Date    Arthritis     Asthma     Bronchitis     GERD (gastroesophageal reflux disease)     Hypercholesterolemia     Hypertension     Hypothyroid     IDDM (insulin dependent diabetes mellitus) (HCC)     Morbid obesity (HCC)     Peripheral neuropathy     Thyroid disease     Venous insufficiency      Past Surgical History:   Procedure Laterality Date    HX AMPUTATION      HX COLONOSCOPY  2015       Prior Level of Function/Environment/Context: patient reports independence with all mobility; uses RW for ambulation; has caregiver from 8-1, M-F  Occupations in which the patient is/was successful, what are the barriers preventing that success:   Performance Patterns (routines, roles, habits, and rituals):   Personal Interests and/or values: Expanded or extensive additional review of patient history:     Home Situation  Home Environment: Apartment  # Steps to Enter: 0  One/Two Story Residence: One story  # of Interior Steps:  (not very sure of number)  Living Alone: Yes  Support Systems: Family member(s), Home care staff (M-F 8:00-13:00)  Patient Expects to be Discharged to[de-identified] Apartment  Current DME Used/Available at Home: Grace Aas, rolling, Grab bars, Shower chair  Tub or Shower Type: Shower  [x]  Right hand dominant   []  Left hand dominant    EXAMINATION OF PERFORMANCE DEFICITS:  Cognitive/Behavioral Status:                          Hearing: Auditory  Auditory Impairment: None    Vision/Perceptual:                                Corrective Lenses: Glasses    Range of Motion:  BUE WFL                            Strength:  BUE WFL                   Coordination:        BUE WFL       Tone & Sensation:  NT                            Balance:  Sitting: Intact  Standing: Intact  Standing - Static: Good  Standing - Dynamic : Good    Functional Mobility and Transfers for ADLs:  Bed Mobility:       Transfers:  Sit to Stand: Modified independent  Toilet Transfer : Modified independent    ADL Assessment:  Feeding: Independent    Oral Facial Hygiene/Grooming: Independent    Bathing: Moderate assistance    Upper Body Dressing: Modified independent    Lower Body Dressing: Minimum assistance    Toileting: Minimum assistance                ADL Intervention and task modifications:       Grooming  Grooming Assistance: Independent  Washing Hands: Independent (cues to wash hands)         Lower Body Bathing  Perineal  : Compensatory technique training (reviewed flossing technique)  Position Performed: Standing         Lower Body Dressing Assistance  Socks:  Total assistance (dependent)    Toileting  Toileting Assistance: Minimum assistance  Bladder Hygiene: Modified independent  Bowel Hygiene: Minimum assistance  Clothing Management: Modified independent  Cues: Verbal cues provided  Adaptive Equipment: Walker;Grab bars (recommended toilet aid use, provided 2 resources)     issued handout for toilet aid purchase thru [facility] and for bariatric BSC, issued instructions for homemade toilet aid. Also educated to increase thoracic rotation and R lateral flexion to increase reach to posterior periarea with Bm hygiene--still can reach effectively, yet improved--educated to complete every time she toilets to work on ROM, attempted in standing x 2 techniques as well with decreased reach. Therapeutic Exercise:    Functional Measure:  Barthel Index:    Bathin  Bladder: 10  Bowels: 10  Groomin  Dressin  Feeding: 10  Mobility: 0  Stairs: 0  Toilet Use: 5  Transfer (Bed to Chair and Back): 15  Total: 60       Barthel and G-code impairment scale:  Percentage of impairment CH  0% CI  1-19% CJ  20-39% CK  40-59% CL  60-79% CM  80-99% CN  100%   Barthel Score 0-100 100 99-80 79-60 59-40 20-39 1-19   0   Barthel Score 0-20 20 17-19 13-16 9-12 5-8 1-4 0      The Barthel ADL Index: Guidelines  1. The index should be used as a record of what a patient does, not as a record of what a patient could do. 2. The main aim is to establish degree of independence from any help, physical or verbal, however minor and for whatever reason. 3. The need for supervision renders the patient not independent. 4. A patient's performance should be established using the best available evidence. Asking the patient, friends/relatives and nurses are the usual sources, but direct observation and common sense are also important. However direct testing is not needed. 5. Usually the patient's performance over the preceding 24-48 hours is important, but occasionally longer periods will be relevant. 6. Middle categories imply that the patient supplies over 50 per cent of the effort. 7. Use of aids to be independent is allowed. Dahiana Ventura, Barthel, D.W. (5855). Functional evaluation: the Barthel Index.  [facility] J ([phone]. ELLI Chaudhary, Carson Shay., Judith Hernandez., Gail, [address] (1999). Measuring the change indisability after inpatient rehabilitation; comparison of the responsiveness of the Barthel Index and Functional [place] Measure. Journal of Neurology, Neurosurgery, and Psychiatry, 66(4), 352-586. TAMY Barbosa, LINDA Clarke, & Evon Arriaga M.A. (2004.) Assessment of post-stroke quality of life in cost-effectiveness studies: The usefulness of the Barthel Index and the EuroQoL-5D. Quality of Life Research, 13, 250-33       G codes: In compliance with CMSs Claims Based Outcome Reporting, the following G-code set was chosen for this patient based on their primary functional limitation being treated: The outcome measure chosen to determine the severity of the functional limitation was the Barthel index with a score of 60/100 which was correlated with the impairment scale. ? Self Care:     - CURRENT STATUS: CJ - 20%-39% impaired, limited or restricted    - GOAL STATUS: CI - 1%-19% impaired, limited or restricted    - D/C STATUS:  ---------------To be determined---------------     Occupational Therapy Evaluation Charge Determination   History Examination Decision-Making   LOW Complexity : Brief history review  LOW Complexity : 1-3 performance deficits relating to physical, cognitive , or psychosocial skils that result in activity limitations and / or participation restrictions  LOW Complexity : No comorbidities that affect functional and no verbal or physical assistance needed to complete eval tasks       Based on the above components, the patient evaluation is determined to be of the following complexity level: LOW   Pain:  Pain Scale 1: Numeric (0 - 10)  Pain Intensity 1: 0              Activity Tolerance:   good  Please refer to the flowsheet for vital signs taken during this treatment.   After treatment:   [x] Patient left in no apparent distress sitting up in chair  [] Patient left in no apparent distress in bed  [x] Call bell left within reach  [] Nursing notified  [] Caregiver present  [] Bed alarm activated    COMMUNICATION/EDUCATION:   The patients plan of care was discussed with: Registered Nurse. [x] Home safety education was provided and the patient/caregiver indicated understanding. [x] Patient/family have participated as able in goal setting and plan of care. [x] Patient/family agree to work toward stated goals and plan of care. [] Patient understands intent and goals of therapy, but is neutral about his/her participation. [] Patient is unable to participate in goal setting and plan of care. This patients plan of care is appropriate for delegation to [facility].     Thank you for this referral.  Mickie Osuna V, OTR/L   27 minutes

## 2018-03-19 NOTE — PROGRESS NOTES
General Surgery End of Shift Nursing Note    Bedside shift change report given to Lin (oncoming nurse) by Kenroy Borden (offgoing nurse). Report included the following information SBAR, Kardex, Intake/Output, MAR and Recent Results. Shift worked:   4132-2188   Summary of shift:    Medicated x 1 for c/o right foot with relief. Up to BSc multiple times, voiding. Issues for physician to address:   None     Number times ambulated in hallway past shift: 0    Number of times OOB to chair past shift: Multiple    Pain Management:  Current medication: Yes  Patient states pain is manageable on current pain medication: YES    GI:    Current diet:  DIET DIABETIC CONSISTENT CARB Regular  DIET ONE TIME MESSAGE    Tolerating current diet: YES  Passing flatus: YES  Last Bowel Movement: several days ago   Appearance: Unobserved    Respiratory:    Incentive Spirometer at bedside: YES  Patient instructed on use: YES    Patient Safety:    Falls Score: 2  Bed Alarm On? No  Sitter?  No    Marleen Forbes

## 2018-03-20 VITALS
RESPIRATION RATE: 18 BRPM | DIASTOLIC BLOOD PRESSURE: 81 MMHG | SYSTOLIC BLOOD PRESSURE: 133 MMHG | TEMPERATURE: 97.5 F | OXYGEN SATURATION: 99 % | BODY MASS INDEX: 43.99 KG/M2 | HEIGHT: 61 IN | HEART RATE: 70 BPM | WEIGHT: 233 LBS

## 2018-03-20 LAB
ALBUMIN SERPL-MCNC: 3.1 G/DL (ref 3.5–5)
ALBUMIN/GLOB SERPL: 0.9 {RATIO} (ref 1.1–2.2)
ALP SERPL-CCNC: 141 U/L (ref 45–117)
ALT SERPL-CCNC: 18 U/L (ref 12–78)
ANION GAP SERPL CALC-SCNC: 6 MMOL/L (ref 5–15)
AST SERPL-CCNC: 12 U/L (ref 15–37)
BILIRUB SERPL-MCNC: 0.6 MG/DL (ref 0.2–1)
BUN SERPL-MCNC: 27 MG/DL (ref 6–20)
BUN/CREAT SERPL: 21 (ref 12–20)
CALCIUM SERPL-MCNC: 8.9 MG/DL (ref 8.5–10.1)
CHLORIDE SERPL-SCNC: 94 MMOL/L (ref 97–108)
CO2 SERPL-SCNC: 30 MMOL/L (ref 21–32)
CREAT SERPL-MCNC: 1.27 MG/DL (ref 0.55–1.02)
ERYTHROCYTE [DISTWIDTH] IN BLOOD BY AUTOMATED COUNT: 15.7 % (ref 11.5–14.5)
GLOBULIN SER CALC-MCNC: 3.6 G/DL (ref 2–4)
GLUCOSE SERPL-MCNC: 250 MG/DL (ref 65–100)
HCT VFR BLD AUTO: 42.3 % (ref 35–47)
HGB BLD-MCNC: 13.1 G/DL (ref 11.5–16)
MCH RBC QN AUTO: 24 PG (ref 26–34)
MCHC RBC AUTO-ENTMCNC: 31 G/DL (ref 30–36.5)
MCV RBC AUTO: 77.5 FL (ref 80–99)
NRBC # BLD: 0 K/UL (ref 0–0.01)
NRBC BLD-RTO: 0 PER 100 WBC
PLATELET # BLD AUTO: 165 K/UL (ref 150–400)
PMV BLD AUTO: 10.8 FL (ref 8.9–12.9)
POTASSIUM SERPL-SCNC: 3.8 MMOL/L (ref 3.5–5.1)
PROT SERPL-MCNC: 6.7 G/DL (ref 6.4–8.2)
RBC # BLD AUTO: 5.46 M/UL (ref 3.8–5.2)
SODIUM SERPL-SCNC: 130 MMOL/L (ref 136–145)
WBC # BLD AUTO: 7.9 K/UL (ref 3.6–11)

## 2018-03-20 PROCEDURE — 74011250637 HC RX REV CODE- 250/637: Performed by: EMERGENCY MEDICINE

## 2018-03-20 PROCEDURE — 85027 COMPLETE CBC AUTOMATED: CPT | Performed by: INTERNAL MEDICINE

## 2018-03-20 PROCEDURE — 36415 COLL VENOUS BLD VENIPUNCTURE: CPT | Performed by: INTERNAL MEDICINE

## 2018-03-20 PROCEDURE — 74011250637 HC RX REV CODE- 250/637: Performed by: INTERNAL MEDICINE

## 2018-03-20 PROCEDURE — 80053 COMPREHEN METABOLIC PANEL: CPT | Performed by: INTERNAL MEDICINE

## 2018-03-20 RX ORDER — INSULIN ASPART 100 [IU]/ML
5 INJECTION, SOLUTION INTRAVENOUS; SUBCUTANEOUS
Qty: 3 ADJUSTABLE DOSE PRE-FILLED PEN SYRINGE | Refills: 0 | Status: SHIPPED | OUTPATIENT
Start: 2018-03-20

## 2018-03-20 RX ADMIN — FAMOTIDINE 20 MG: 20 TABLET, FILM COATED ORAL at 07:11

## 2018-03-20 RX ADMIN — NYSTATIN: 100000 POWDER TOPICAL at 10:08

## 2018-03-20 RX ADMIN — Medication 10 ML: at 07:12

## 2018-03-20 RX ADMIN — LEVOTHYROXINE SODIUM 150 MCG: 150 TABLET ORAL at 07:10

## 2018-03-20 RX ADMIN — ASPIRIN 81 MG: 81 TABLET, COATED ORAL at 10:06

## 2018-03-20 NOTE — PROGRESS NOTES
Home Health Care Discharge Planning: [facility]  Face to Face Encounter      NAME: Mague Michel   :  1960   MRN:  [id number]     Primary Diagnosis: Gait Dysfunction, Insulin dependent diabetes, Intellectual disability    Date of Face to Face:  3/20/2018 8:46 AM                                  Face to Face Encounter findings are related to primary reason for home care:   YES    1. I certify that the patient needs intermittent skilled nursing care, physical therapy and/or speech therapy. I will not be following this patient in the Community and Dr. Kaleb Fu MD will be responsible for signing the [address] of Care. 2. Initial Orders for Care: Skilled Nursing, Physical Therapy, Occupational Therapy and Medical       3. I certify that this patient is homebound because of illness or injury, need the aid of supportive devices such as crutches, canes, wheelchairs, and walkers; the use of special transportation; or the assistance of another person in order to leave their place of residence. There exists a normal inability to leave home and leaving home requires a considerable and taxing effort. 4. I certify that this patient is under my care and that I had a Face-to-Face Encounter that meets the physician Face-to-Face Encounter requirements. Document the physical findings from the Face-to-Face Encounter that support the need for skilled services: Has new medications that requires skilled nursing teaching and monitoring for understanding and compliance , Needs skilled safety assessment and interventions  and Has new finding of weakness and altered mobility that requires skilled physical/occupational therapy services for evaluation and interventions.      Caesar Kerr MD  Discharging Physician  Office: [phone]  Fax:   [phone]

## 2018-03-20 NOTE — PROGRESS NOTES
Physical Therapy  Chart reviewed for updates and attempted to see patient for PT treatment. Patient preparing for discharge home on arrival. Witnessed patient ambulating around room and to doorway with use of RW. [facility]. Will defer treatment to not delay discharge process as patient and caregiver are verbally eager to leave.    [place], PT, DPT

## 2018-03-20 NOTE — PROGRESS NOTES
General Surgery End of Shift Nursing Note    Bedside shift change report given to REY Wade (oncoming nurse) by Edgard Jolley RN (offgoing nurse). Report included the following information SBAR, Kardex, Intake/Output, MAR and Recent Results. Shift worked:   [id number]   Summary of shift:    Medicated x 1 for c/o right foot with relief. Issues for physician to address:   None     Number times ambulated in hallway past shift: 0    Number of times OOB to chair past shift: Multiple    Pain Management:  Current medication: Yes  Patient states pain is manageable on current pain medication: YES    GI:    Current diet:  DIET DIABETIC CONSISTENT CARB Regular  DIET ONE TIME MESSAGE    Tolerating current diet: YES  Passing flatus: YES  Last Bowel Movement: several days ago   Appearance: Unobserved    Respiratory:    Incentive Spirometer at bedside: YES  Patient instructed on use: YES    Patient Safety:    Falls Score: 2  Bed Alarm On? No  Sitter?  No    Marleen Forbes

## 2018-03-20 NOTE — DISCHARGE SUMMARY
Hospitalist Discharge Summary     Patient ID:  Cassie Ribeiro  [id number]  67 y.o.  1960    PCP on record: Chetan Roth MD    Admit date: 3/15/2018  Discharge date and time: 3/20/2018      DISCHARGE DIAGNOSIS:  Right Foot Pain, Acute Gout flare: resolved  Uncontrolled type 2 diabetes with DKA: Acute kidney injury on chronic kidney disease stage III; ANN resolved  Leukocytosis: likely reactive and resolved  Acute Pancreatitis: POA; resolved; from uncontrolled glucose  Intractable N/V: from DKA, resolved  GERD  Thrombocytopenia: resolved  Chronic venous stasis  Hypothyroidism  Hypertension  Tobacco use and suspected COPD  Intellectual Disability: Patient does not have capacity to make medical decisions for herself based on Evaluation by Dr. Cody Patel on 3/16    CONSULTATIONS:  IP CONSULT TO NEPHROLOGY  IP CONSULT TO PSYCHOLOGY    Excerpted HPI from H&P of Shadia Nayak MD:  Adryan Nicole is a 59 y.o.  woman with a history of obesity, diabetes with prior episodes of DKA, chronic venous stasis, hypothyroidism, hypertension, recurrent bouts of nausea vomiting who reports that she started getting sick on Saturday with nausea vomiting poor appetite has not been taking her insulin regularly was trying to manage at home did not want to come to the hospital but got sicker and sicker. She denies any abdominal pain she has had some loose stools but the last time was 2 days ago no blood in her vomit no blood in her stool or melena. Her sugars have been running in the 300 range last night they were over 600 and this morning they were over 600 she felt poorly and came to the ER was seen at [facility] this morning. In the ER at [facility] she was found to have DKA with sugars greater than 600, elevated white count of 14, acute renal failure with a creatinine of 2.4, negative chest x-ray, negative urine, she was started on an insulin drip.  Her pH was 7.11 on ABG and she was transferred to this ER for further evaluation and management. In our ER, her vital signs have been stable continues on insulin drip and we were asked to admit for further evaluation and management. Her anion gap is still elevated from 36-20 currently. Her bicarb has gone from 10-19 so that is improving as well. The patient reports that she is thirsty and is requesting some coffee. She says she still does not feel well but cannot describe exactly what her symptoms are currently. She denies any abdominal pain and currently no nausea vomiting. She admits to having a cough that is dry and some shortness of breath since Saturday she is unsure which started first and nausea vomiting or the cough. She is a smoker. She denies any focal weakness numbness or tingling no current lightheadedness or dizziness no chest pain or pressure. She is not had any fevers she says she has felt chilled in the last few days. And again requests some coffee. \"    ______________________________________________________________________  DISCHARGE SUMMARY/HOSPITAL COURSE:  for full details see H&P, daily progress notes, labs, consult notes.      Hospital course via problem below:  Right Foot Pain, Acute Gout flare: on 3/17 foot was tender to touch, Uric acid level 7.7 (expect to be lower with gout flare); patient unable to stand on 3/17  -completed prednisone, pain better  -PT/OT recommended SNF but pt and sister want her to have [facility]; will arrange [facility] PT/OT/SN and SW     Uncontrolled type 2 diabetes with DKA: DKA resolved and now with high glucose because of Prednisone (see above)  -s/p insulin gtt  -patient discharged on her PTA dose of insulin with SSI (patient able to describe SSI to me in her own words without me educating her first)    Acute kidney injury on chronic kidney disease stage III:  -renal function improved  -continue hold nephrotoxins as well as home diuretic and cozaar  -3/16 renal ultrasound on 3/16 with small kidneys and no other abnormalities  -Nephrology consult appreciated     Leukocytosis: likely reactive, AF, UA and CXR wnl  -Leukocytosis normalized     Acute Pancreatitis: POA; resolved; relation to uncontrolled BG  Intractable N/V: from DKA, resolved  GERD:  -tolerating diet  -pepcid  -RUQ ultrasound showed, \"Echogenic hepatic parenchyma as described above. No biliary dilatation or gallstones. \"     Thrombocytopenia: sq heparin discontinued on 3/18 as platelets decreased from 245 to 137 to 113K  -wnl on discharge    Chronic venous stasis:  -discontinue Norvasc    Hypothyroidism:  -TSH wnl, continue home levothyroxine    Hypertension: BP acceptable at this time, should improve further since IVF's again stopped  -discontinue Norvasc as above  -hold home Cozaar as above  -prn IV hydralazine    Tobacco use and suspected COPD  -prn nicotine patch   Intellectual Disability: Patient does not have capacity to make medical decisions based on Evaluation by Dr. Camden Kern on 3/16, sister KARIN     Morbid Obesity: Body mass index is 44.02 kg/(m^2). _______________________________________________________________________  Patient seen and examined by me on discharge day. Pertinent Findings:  Gen:    Not in distress  Chest: Clear lungs  CVS:   Regular rhythm  Abd:  Soft, not distended, not tender  Neuro:  Alert  _______________________________________________________________________  DISCHARGE MEDICATIONS:   Current Discharge Medication List      CONTINUE these medications which have CHANGED    Details   insulin aspart U-100 (NOVOLOG FLEXPEN U-100 INSULIN) 100 unit/mL inpn 5 Units by SubCUTAneous route Before breakfast, lunch, and dinner. + SSI TIDAC:  140-199=2 u            200-249=3 u  250-299=5 u  300-349=8 u  Qty: 3 Adjustable Dose Pre-filled Pen Syringe, Refills: 0         CONTINUE these medications which have NOT CHANGED    Details   levothyroxine (SYNTHROID) 150 mcg tablet Take 150 mcg by mouth Daily (before breakfast).       gabapentin (NEURONTIN) 300 mg capsule Take 600 mg by mouth nightly. insulin glargine (LANTUS) 100 unit/mL injection 8 Units by SubCUTAneous route nightly. ZINC PO Take 1 Tab by mouth two (2) times a day. aspirin delayed-release 81 mg tablet Take 81 mg by mouth daily. ipratropium-albuterol (COMBIVENT RESPIMAT)  mcg/actuation inhaler Take 1 Puff by inhalation every six (6) hours as needed for Shortness of Breath. STOP taking these medications       potassium chloride SR (KLOR-CON 10) 10 mEq tablet Comments:   Reason for Stopping:         amLODIPine (NORVASC) 5 mg tablet Comments:   Reason for Stopping:         torsemide (DEMADEX) 20 mg tablet Comments:   Reason for Stopping:         losartan (COZAAR) 25 mg tablet Comments:   Reason for Stopping:         potassium chloride SR (KLOR-CON 10) 10 mEq tablet Comments:   Reason for Stopping:               My Recommended Diet, Activity, Wound Care, and follow-up labs are listed in the patient's Discharge Insturctions which I have personally completed and reviewed.     ______________________________________________________________________    Risk of deterioration: Low    Condition at Discharge:  Stable  ______________________________________________________________________    Disposition  Home with family and home health services  ______________________________________________________________________    Care Plan discussed with:   Patient, RN, Care Manager    ______________________________________________________________________    Code Status: Full Code  ______________________________________________________________________      Follow up with:   PCP : Deanna Gallego MD  Follow-up Information     Follow up With Details [address], [place]   [address]  [phone]                Total time in minutes spent coordinating this discharge (includes going over instructions, follow-up, prescriptions, and preparing report for sign off to her PCP) :  35 minutes    Signed:  Rajeev Rouse MD

## 2018-03-20 NOTE — DISCHARGE INSTRUCTIONS
HOSPITALIST DISCHARGE INSTRUCTIONS    NAME: Marine Choudhary   :  1960   MRN:  [id number]     Date/Time:  3/20/2018 8:37 AM    ADMIT DATE: 3/15/2018     DISCHARGE DATE: 3/20/2018     DISCHARGE DIAGNOSIS:  Right Foot Pain, Acute Gout flare: resolved  Uncontrolled type 2 diabetes with DKA: Acute kidney injury on chronic kidney disease stage III; ANN resolved  Leukocytosis: likely reactive and resolved  Acute Pancreatitis: POA; resolved; suspect relation to uncontrolled BG  Intractable N/V: from DKA, resolved  GERD  Thrombocytopenia: resolved  Chronic venous stasis  Hypothyroidism  Hypertension  Tobacco use and suspected COPD  Intellectual Disability: Patient does not have capacity to make medical decisions for herself based on Evaluation by Dr. Maria Leong on 3/16    MEDICATIONS:  As per medication reconciliation  list  · It is important that you take the medication exactly as they are prescribed. · Keep your medication in the bottles provided by the pharmacist and keep a list of the medication names, dosages, and times to be taken in your wallet. · Do not take other medications without consulting your doctor. Pain Management: per above medications    What to do at Home    Recommended diet:  Diabetic Diet     Recommended activity: PT/OT Eval and Treat      If you experience any of the following symptoms then please call your primary care physician or return to the emergency room if you cannot get hold of your doctor:  Fever, chills, nausea, vomiting, diarrhea, change in mentation, falling, bleeding, shortness of breath or any other concerning symptoms. Follow Up:  Dr. Dennise Arellano MD  Within 1 week. Discuss your diabetes management and blood pressure at this visit. If your restart diuretics (torsemide) then you will need very close moniotring of your kidney function. I also stopped you norvasc as sometimes this medication can cause swelling in the legs.   Information obtained by :  I understand that if any problems occur once I am at home I am to contact my physician. I understand and acknowledge receipt of the instructions indicated above.                                                                                                                                            Physician's or R.N.'s Signature                                                                  Date/Time                                                                                                                                              Patient or Representative Signature                                                          Date/Time

## 2018-03-20 NOTE — PROGRESS NOTES
Pt is a 59 y.o.  female, admitted for diabetic ketoacidosis without coma associated with diabetes mellitus due to underlying conditions. Pt was alert and oriented, upon CM room visit. Pt reported that she resides alone in a one story home (no steps into main entrance). Pt reported that she is independent with ADLs, and does not drive. Pt reported that she is active with PCP: seen in March 2018, for physical.  Pt reported that she uses [facility] [address]. Pt reported DME at home: walker and cane. Pt reported HH in the past and no SNF. Pt reported that she has a personal care aide in the home, provided by [facility] in Home (mon-fri 8-1pm) [phone]. CM will follow up with aide, to inform them that pt is returning home. CM informed pt that she is being recommended for home health at d/c.  CM sent a referral to [address] [place] [address], and is currently waiting for auth. Pt informed of FOC document (signed) placed in chart. Pt aware that her nurse will review d/c plans. CM attempted to contact pt's sister: Brody Bailey telephone regards to pt's d/c needs, however, the attempt was unsuccessful CM left voicemail in regards to pt's needs. UPDATE: 10:08AM    CM was informed that pt will not be accepted to [facility], due to staffing, CM will send referral to [facility]. CM was informed that [facility] has accepeted pt. CM contact private duty aide, and it was reported that pt will have to contact her private duty nurse once back home. Pt informed of 2nd  Medicare letter (signed) placed in chart  . Care Management Interventions  PCP Verified by CM: Yes  Mode of Transport at Discharge:  Other (see comment) (pts sister )  Transition of Care Consult (CM Consult): Discharge Planning, [address]: Yes  Discharge Durable Medical Equipment: No  Physical Therapy Consult: Yes  Occupational Therapy Consult: Yes  Speech Therapy Consult: No  Current Support Network: Lives Alone, Own Home  Confirm Follow Up Transport: Family  Plan discussed with Pt/Family/Caregiver: Yes  Freedom of Choice Offered: Yes  Discharge Location  Discharge Placement: Home with home health    ANTHONY Her   [phone]

## 2018-03-21 NOTE — PROGRESS NOTES
CM was informed by [facility] that they are unable to accept due to pt's insurance provider. CM will resend referral back to [address], to attempt with pt receiving home health services. DANIELLE is currently waiting for auth. CM was accepted to [address].     [facility] [phone]

## 2018-03-21 NOTE — PROGRESS NOTES
Documented on 3/21/18:    Spiritual Care Partner Volunteer visited patient in Gen Surg on 3/20/2018. Documented by:  Gregoria Steward M.Div.    Paging Service [phone])

## 2018-03-22 ENCOUNTER — HOME HEALTH ADMISSION (OUTPATIENT)
Dept: HOME HEALTH SERVICES | Facility: HOME HEALTH | Age: 58
End: 2018-03-22
Payer: MEDICARE

## 2018-03-23 ENCOUNTER — HOME CARE VISIT (OUTPATIENT)
Dept: SCHEDULING | Facility: HOME HEALTH | Age: 58
End: 2018-03-23
Payer: MEDICARE

## 2018-03-23 PROCEDURE — G0299 HHS/HOSPICE OF RN EA 15 MIN: HCPCS

## 2018-03-23 PROCEDURE — 3331090001 HH PPS REVENUE CREDIT

## 2018-03-23 PROCEDURE — 3331090002 HH PPS REVENUE DEBIT

## 2018-03-23 PROCEDURE — 400013 HH SOC

## 2018-03-24 PROCEDURE — 3331090001 HH PPS REVENUE CREDIT

## 2018-03-24 PROCEDURE — 3331090002 HH PPS REVENUE DEBIT

## 2018-03-25 PROCEDURE — 3331090001 HH PPS REVENUE CREDIT

## 2018-03-25 PROCEDURE — 3331090002 HH PPS REVENUE DEBIT

## 2018-03-26 ENCOUNTER — HOME CARE VISIT (OUTPATIENT)
Dept: SCHEDULING | Facility: HOME HEALTH | Age: 58
End: 2018-03-26
Payer: MEDICARE

## 2018-03-26 VITALS
RESPIRATION RATE: 20 BRPM | HEART RATE: 84 BPM | SYSTOLIC BLOOD PRESSURE: 132 MMHG | DIASTOLIC BLOOD PRESSURE: 74 MMHG | OXYGEN SATURATION: 98 % | TEMPERATURE: 98.2 F

## 2018-03-26 PROCEDURE — 3331090001 HH PPS REVENUE CREDIT

## 2018-03-26 PROCEDURE — 3331090002 HH PPS REVENUE DEBIT

## 2018-03-26 PROCEDURE — G0151 HHCP-SERV OF PT,EA 15 MIN: HCPCS

## 2018-03-27 ENCOUNTER — HOME CARE VISIT (OUTPATIENT)
Dept: SCHEDULING | Facility: HOME HEALTH | Age: 58
End: 2018-03-27
Payer: MEDICARE

## 2018-03-27 ENCOUNTER — HOME CARE VISIT (OUTPATIENT)
Dept: HOME HEALTH SERVICES | Facility: HOME HEALTH | Age: 58
End: 2018-03-27
Payer: MEDICARE

## 2018-03-27 VITALS
HEART RATE: 98 BPM | SYSTOLIC BLOOD PRESSURE: 144 MMHG | DIASTOLIC BLOOD PRESSURE: 82 MMHG | RESPIRATION RATE: 18 BRPM | TEMPERATURE: 97.6 F

## 2018-03-27 PROCEDURE — 3331090002 HH PPS REVENUE DEBIT

## 2018-03-27 PROCEDURE — G0152 HHCP-SERV OF OT,EA 15 MIN: HCPCS

## 2018-03-27 PROCEDURE — 3331090001 HH PPS REVENUE CREDIT

## 2018-03-27 PROCEDURE — G0299 HHS/HOSPICE OF RN EA 15 MIN: HCPCS

## 2018-03-28 VITALS
OXYGEN SATURATION: 94 % | HEART RATE: 60 BPM | RESPIRATION RATE: 20 BRPM | TEMPERATURE: 97.6 F | DIASTOLIC BLOOD PRESSURE: 82 MMHG | SYSTOLIC BLOOD PRESSURE: 144 MMHG

## 2018-03-28 PROCEDURE — 3331090001 HH PPS REVENUE CREDIT

## 2018-03-28 PROCEDURE — 3331090002 HH PPS REVENUE DEBIT

## 2018-03-29 PROCEDURE — 3331090001 HH PPS REVENUE CREDIT

## 2018-03-29 PROCEDURE — 3331090002 HH PPS REVENUE DEBIT

## 2018-03-30 ENCOUNTER — HOME CARE VISIT (OUTPATIENT)
Dept: SCHEDULING | Facility: HOME HEALTH | Age: 58
End: 2018-03-30
Payer: MEDICARE

## 2018-03-30 VITALS
OXYGEN SATURATION: 96 % | TEMPERATURE: 98.4 F | HEART RATE: 78 BPM | DIASTOLIC BLOOD PRESSURE: 76 MMHG | SYSTOLIC BLOOD PRESSURE: 128 MMHG | RESPIRATION RATE: 20 BRPM

## 2018-03-30 PROCEDURE — G0299 HHS/HOSPICE OF RN EA 15 MIN: HCPCS

## 2018-03-30 PROCEDURE — 3331090001 HH PPS REVENUE CREDIT

## 2018-03-30 PROCEDURE — 3331090002 HH PPS REVENUE DEBIT

## 2018-03-31 PROCEDURE — 3331090001 HH PPS REVENUE CREDIT

## 2018-03-31 PROCEDURE — 3331090002 HH PPS REVENUE DEBIT

## 2018-04-01 PROCEDURE — 3331090001 HH PPS REVENUE CREDIT

## 2018-04-01 PROCEDURE — 3331090002 HH PPS REVENUE DEBIT

## 2018-04-02 PROCEDURE — 3331090002 HH PPS REVENUE DEBIT

## 2018-04-02 PROCEDURE — 3331090001 HH PPS REVENUE CREDIT

## 2018-04-03 ENCOUNTER — HOME CARE VISIT (OUTPATIENT)
Dept: SCHEDULING | Facility: HOME HEALTH | Age: 58
End: 2018-04-03
Payer: MEDICARE

## 2018-04-03 VITALS
OXYGEN SATURATION: 98 % | SYSTOLIC BLOOD PRESSURE: 140 MMHG | RESPIRATION RATE: 16 BRPM | DIASTOLIC BLOOD PRESSURE: 80 MMHG | HEART RATE: 80 BPM | TEMPERATURE: 98.4 F

## 2018-04-03 PROCEDURE — G0299 HHS/HOSPICE OF RN EA 15 MIN: HCPCS

## 2018-04-03 PROCEDURE — 3331090001 HH PPS REVENUE CREDIT

## 2018-04-03 PROCEDURE — 3331090002 HH PPS REVENUE DEBIT

## 2018-04-04 VITALS
SYSTOLIC BLOOD PRESSURE: 122 MMHG | TEMPERATURE: 98.1 F | RESPIRATION RATE: 20 BRPM | HEART RATE: 84 BPM | OXYGEN SATURATION: 98 % | DIASTOLIC BLOOD PRESSURE: 70 MMHG

## 2018-04-04 PROCEDURE — 3331090002 HH PPS REVENUE DEBIT

## 2018-04-04 PROCEDURE — 3331090001 HH PPS REVENUE CREDIT

## 2018-04-05 ENCOUNTER — HOME CARE VISIT (OUTPATIENT)
Dept: HOME HEALTH SERVICES | Facility: HOME HEALTH | Age: 58
End: 2018-04-05
Payer: MEDICARE

## 2018-04-05 ENCOUNTER — HOME CARE VISIT (OUTPATIENT)
Dept: SCHEDULING | Facility: HOME HEALTH | Age: 58
End: 2018-04-05
Payer: MEDICARE

## 2018-04-05 PROCEDURE — 3331090001 HH PPS REVENUE CREDIT

## 2018-04-05 PROCEDURE — 3331090002 HH PPS REVENUE DEBIT

## 2018-04-05 PROCEDURE — G0299 HHS/HOSPICE OF RN EA 15 MIN: HCPCS

## 2018-04-06 VITALS
TEMPERATURE: 98.2 F | OXYGEN SATURATION: 98 % | DIASTOLIC BLOOD PRESSURE: 70 MMHG | RESPIRATION RATE: 20 BRPM | SYSTOLIC BLOOD PRESSURE: 122 MMHG | HEART RATE: 84 BPM

## 2018-04-06 PROCEDURE — 3331090002 HH PPS REVENUE DEBIT

## 2018-04-06 PROCEDURE — 3331090001 HH PPS REVENUE CREDIT

## 2018-04-07 PROCEDURE — 3331090001 HH PPS REVENUE CREDIT

## 2018-04-07 PROCEDURE — 3331090002 HH PPS REVENUE DEBIT

## 2018-04-08 PROCEDURE — 3331090002 HH PPS REVENUE DEBIT

## 2018-04-08 PROCEDURE — 3331090001 HH PPS REVENUE CREDIT

## 2018-04-09 PROCEDURE — 3331090001 HH PPS REVENUE CREDIT

## 2018-04-09 PROCEDURE — 3331090002 HH PPS REVENUE DEBIT

## 2018-04-10 PROCEDURE — 3331090002 HH PPS REVENUE DEBIT

## 2018-04-10 PROCEDURE — 3331090001 HH PPS REVENUE CREDIT

## 2018-04-11 ENCOUNTER — HOME CARE VISIT (OUTPATIENT)
Dept: SCHEDULING | Facility: HOME HEALTH | Age: 58
End: 2018-04-11
Payer: MEDICARE

## 2018-04-11 PROCEDURE — 3331090002 HH PPS REVENUE DEBIT

## 2018-04-11 PROCEDURE — G0299 HHS/HOSPICE OF RN EA 15 MIN: HCPCS

## 2018-04-11 PROCEDURE — 3331090001 HH PPS REVENUE CREDIT

## 2018-04-12 ENCOUNTER — HOME CARE VISIT (OUTPATIENT)
Dept: HOME HEALTH SERVICES | Facility: HOME HEALTH | Age: 58
End: 2018-04-12
Payer: MEDICARE

## 2018-04-12 PROCEDURE — 3331090001 HH PPS REVENUE CREDIT

## 2018-04-12 PROCEDURE — 3331090002 HH PPS REVENUE DEBIT

## 2018-04-13 ENCOUNTER — HOME CARE VISIT (OUTPATIENT)
Dept: SCHEDULING | Facility: HOME HEALTH | Age: 58
End: 2018-04-13
Payer: MEDICARE

## 2018-04-13 VITALS
HEART RATE: 84 BPM | SYSTOLIC BLOOD PRESSURE: 128 MMHG | TEMPERATURE: 97.4 F | OXYGEN SATURATION: 95 % | RESPIRATION RATE: 18 BRPM | DIASTOLIC BLOOD PRESSURE: 68 MMHG

## 2018-04-13 PROCEDURE — 3331090002 HH PPS REVENUE DEBIT

## 2018-04-13 PROCEDURE — 3331090001 HH PPS REVENUE CREDIT

## 2018-04-13 PROCEDURE — G0299 HHS/HOSPICE OF RN EA 15 MIN: HCPCS

## 2018-04-14 PROCEDURE — 3331090002 HH PPS REVENUE DEBIT

## 2018-04-14 PROCEDURE — 3331090001 HH PPS REVENUE CREDIT

## 2018-04-15 PROCEDURE — 3331090001 HH PPS REVENUE CREDIT

## 2018-04-15 PROCEDURE — 3331090002 HH PPS REVENUE DEBIT

## 2018-04-16 PROCEDURE — 3331090001 HH PPS REVENUE CREDIT

## 2018-04-16 PROCEDURE — 3331090002 HH PPS REVENUE DEBIT

## 2018-04-17 PROCEDURE — 3331090002 HH PPS REVENUE DEBIT

## 2018-04-17 PROCEDURE — 3331090001 HH PPS REVENUE CREDIT

## 2018-04-18 PROCEDURE — 3331090001 HH PPS REVENUE CREDIT

## 2018-04-18 PROCEDURE — 3331090002 HH PPS REVENUE DEBIT

## 2018-04-20 ENCOUNTER — HOME CARE VISIT (OUTPATIENT)
Dept: SCHEDULING | Facility: HOME HEALTH | Age: 58
End: 2018-04-20
Payer: MEDICARE

## 2018-04-20 VITALS
RESPIRATION RATE: 20 BRPM | HEART RATE: 81 BPM | OXYGEN SATURATION: 93 % | SYSTOLIC BLOOD PRESSURE: 138 MMHG | DIASTOLIC BLOOD PRESSURE: 88 MMHG | TEMPERATURE: 98 F

## 2018-04-20 PROCEDURE — A6454 SELF-ADHER BAND W>=3" <5"/YD: HCPCS

## 2018-04-20 PROCEDURE — G0299 HHS/HOSPICE OF RN EA 15 MIN: HCPCS

## 2018-04-26 ENCOUNTER — HOME CARE VISIT (OUTPATIENT)
Dept: HOME HEALTH SERVICES | Facility: HOME HEALTH | Age: 58
End: 2018-04-26
Payer: MEDICARE

## 2018-04-27 ENCOUNTER — HOME CARE VISIT (OUTPATIENT)
Dept: SCHEDULING | Facility: HOME HEALTH | Age: 58
End: 2018-04-27
Payer: MEDICARE

## 2018-04-27 VITALS
SYSTOLIC BLOOD PRESSURE: 128 MMHG | OXYGEN SATURATION: 95 % | TEMPERATURE: 98.5 F | HEART RATE: 80 BPM | DIASTOLIC BLOOD PRESSURE: 82 MMHG | RESPIRATION RATE: 20 BRPM

## 2018-04-27 PROCEDURE — G0299 HHS/HOSPICE OF RN EA 15 MIN: HCPCS

## 2018-11-14 ENCOUNTER — APPOINTMENT (OUTPATIENT)
Dept: CT IMAGING | Age: 58
DRG: 280 | End: 2018-11-14
Attending: HOSPITALIST
Payer: MEDICARE

## 2018-11-14 ENCOUNTER — HOSPITAL ENCOUNTER (INPATIENT)
Age: 58
LOS: 4 days | Discharge: HOME | DRG: 280 | End: 2018-11-18
Attending: EMERGENCY MEDICINE | Admitting: HOSPITALIST
Payer: MEDICARE

## 2018-11-14 DIAGNOSIS — J96.01 ACUTE RESPIRATORY FAILURE WITH HYPOXIA (HCC): ICD-10-CM

## 2018-11-14 DIAGNOSIS — I21.4 NSTEMI (NON-ST ELEVATED MYOCARDIAL INFARCTION) (HCC): ICD-10-CM

## 2018-11-14 DIAGNOSIS — J44.1 COPD EXACERBATION (HCC): Primary | ICD-10-CM

## 2018-11-14 LAB
ARTERIAL PATENCY WRIST A: YES
ATRIAL RATE: 80 BPM
BASE DEFICIT BLDA-SCNC: 5.6 MMOL/L
BDY SITE: ABNORMAL
CALCULATED P AXIS, ECG09: 75 DEGREES
CALCULATED R AXIS, ECG10: 162 DEGREES
CALCULATED T AXIS, ECG11: -63 DEGREES
CK MB CFR SERPL CALC: 5.2 % (ref 0–2.5)
CK MB SERPL-MCNC: 6.4 NG/ML (ref 5–25)
CK SERPL-CCNC: 123 U/L (ref 26–192)
DIAGNOSIS, 93000: NORMAL
FLUAV AG NPH QL IA: NEGATIVE
FLUBV AG NOSE QL IA: NEGATIVE
GAS FLOW.O2 O2 DELIVERY SYS: 3 L/MIN
GLUCOSE BLD STRIP.AUTO-MCNC: 249 MG/DL (ref 65–100)
GLUCOSE BLD STRIP.AUTO-MCNC: 252 MG/DL (ref 65–100)
GLUCOSE BLD STRIP.AUTO-MCNC: 269 MG/DL (ref 65–100)
GLUCOSE BLD STRIP.AUTO-MCNC: 291 MG/DL (ref 65–100)
HCO3 BLDA-SCNC: 23 MMOL/L (ref 22–26)
LIPASE SERPL-CCNC: 48 U/L (ref 73–393)
P-R INTERVAL, ECG05: 170 MS
PCO2 BLDA: 59 MMHG (ref 35–45)
PH BLDA: 7.21 [PH] (ref 7.35–7.45)
PO2 BLDA: 59 MMHG (ref 80–100)
Q-T INTERVAL, ECG07: 442 MS
QRS DURATION, ECG06: 120 MS
QTC CALCULATION (BEZET), ECG08: 509 MS
SAO2 % BLD: 84 % (ref 92–97)
SAO2% DEVICE SAO2% SENSOR NAME: ABNORMAL
SERVICE CMNT-IMP: ABNORMAL
SPECIMEN SITE: ABNORMAL
TROPONIN I SERPL-MCNC: 1.61 NG/ML
VENTRICULAR RATE, ECG03: 80 BPM

## 2018-11-14 PROCEDURE — 74011250636 HC RX REV CODE- 250/636: Performed by: HOSPITALIST

## 2018-11-14 PROCEDURE — 36415 COLL VENOUS BLD VENIPUNCTURE: CPT

## 2018-11-14 PROCEDURE — 94660 CPAP INITIATION&MGMT: CPT

## 2018-11-14 PROCEDURE — 77030038269 HC DRN EXT URIN PURWCK BARD -A

## 2018-11-14 PROCEDURE — C8929 TTE W OR WO FOL WCON,DOPPLER: HCPCS

## 2018-11-14 PROCEDURE — 74011250637 HC RX REV CODE- 250/637: Performed by: NURSE PRACTITIONER

## 2018-11-14 PROCEDURE — 82803 BLOOD GASES ANY COMBINATION: CPT

## 2018-11-14 PROCEDURE — 82553 CREATINE MB FRACTION: CPT

## 2018-11-14 PROCEDURE — 87804 INFLUENZA ASSAY W/OPTIC: CPT

## 2018-11-14 PROCEDURE — 65660000000 HC RM CCU STEPDOWN

## 2018-11-14 PROCEDURE — 74176 CT ABD & PELVIS W/O CONTRAST: CPT

## 2018-11-14 PROCEDURE — 74011250636 HC RX REV CODE- 250/636

## 2018-11-14 PROCEDURE — 93005 ELECTROCARDIOGRAM TRACING: CPT

## 2018-11-14 PROCEDURE — 84484 ASSAY OF TROPONIN QUANT: CPT

## 2018-11-14 PROCEDURE — 36600 WITHDRAWAL OF ARTERIAL BLOOD: CPT

## 2018-11-14 PROCEDURE — 74011250637 HC RX REV CODE- 250/637: Performed by: HOSPITALIST

## 2018-11-14 PROCEDURE — 82962 GLUCOSE BLOOD TEST: CPT

## 2018-11-14 PROCEDURE — 71250 CT THORAX DX C-: CPT

## 2018-11-14 PROCEDURE — 83690 ASSAY OF LIPASE: CPT

## 2018-11-14 PROCEDURE — 74011250636 HC RX REV CODE- 250/636: Performed by: NURSE PRACTITIONER

## 2018-11-14 PROCEDURE — 99285 EMERGENCY DEPT VISIT HI MDM: CPT

## 2018-11-14 PROCEDURE — 74011636637 HC RX REV CODE- 636/637: Performed by: HOSPITALIST

## 2018-11-14 RX ORDER — POTASSIUM CHLORIDE 20 MEQ/1
20 TABLET, EXTENDED RELEASE ORAL 2 TIMES DAILY
COMMUNITY

## 2018-11-14 RX ORDER — TORSEMIDE 20 MG/1
20 TABLET ORAL EVERY OTHER DAY
COMMUNITY

## 2018-11-14 RX ORDER — METOPROLOL TARTRATE 25 MG/1
12.5 TABLET, FILM COATED ORAL EVERY 12 HOURS
Status: DISCONTINUED | OUTPATIENT
Start: 2018-11-14 | End: 2018-11-18 | Stop reason: HOSPADM

## 2018-11-14 RX ORDER — INSULIN LISPRO 100 [IU]/ML
INJECTION, SOLUTION INTRAVENOUS; SUBCUTANEOUS
Status: DISCONTINUED | OUTPATIENT
Start: 2018-11-14 | End: 2018-11-18 | Stop reason: HOSPADM

## 2018-11-14 RX ORDER — SODIUM CHLORIDE 0.9 % (FLUSH) 0.9 %
5-10 SYRINGE (ML) INJECTION EVERY 8 HOURS
Status: DISCONTINUED | OUTPATIENT
Start: 2018-11-14 | End: 2018-11-18 | Stop reason: HOSPADM

## 2018-11-14 RX ORDER — IPRATROPIUM BROMIDE AND ALBUTEROL SULFATE 2.5; .5 MG/3ML; MG/3ML
3 SOLUTION RESPIRATORY (INHALATION)
Status: DISCONTINUED | OUTPATIENT
Start: 2018-11-14 | End: 2018-11-18 | Stop reason: HOSPADM

## 2018-11-14 RX ORDER — INSULIN GLARGINE 100 [IU]/ML
12 INJECTION, SOLUTION SUBCUTANEOUS
Status: DISCONTINUED | OUTPATIENT
Start: 2018-11-14 | End: 2018-11-18 | Stop reason: HOSPADM

## 2018-11-14 RX ORDER — GABAPENTIN 300 MG/1
600 CAPSULE ORAL
Status: DISCONTINUED | OUTPATIENT
Start: 2018-11-14 | End: 2018-11-18 | Stop reason: HOSPADM

## 2018-11-14 RX ORDER — ACETAMINOPHEN 325 MG/1
650 TABLET ORAL
Status: DISCONTINUED | OUTPATIENT
Start: 2018-11-14 | End: 2018-11-18 | Stop reason: HOSPADM

## 2018-11-14 RX ORDER — ASPIRIN 81 MG/1
81 TABLET ORAL DAILY
Status: DISCONTINUED | OUTPATIENT
Start: 2018-11-14 | End: 2018-11-14

## 2018-11-14 RX ORDER — ENOXAPARIN SODIUM 100 MG/ML
1 INJECTION SUBCUTANEOUS ONCE
Status: COMPLETED | OUTPATIENT
Start: 2018-11-14 | End: 2018-11-14

## 2018-11-14 RX ORDER — MAGNESIUM SULFATE 100 %
4 CRYSTALS MISCELLANEOUS AS NEEDED
Status: DISCONTINUED | OUTPATIENT
Start: 2018-11-14 | End: 2018-11-18 | Stop reason: HOSPADM

## 2018-11-14 RX ORDER — SODIUM CHLORIDE 0.9 % (FLUSH) 0.9 %
5-10 SYRINGE (ML) INJECTION AS NEEDED
Status: DISCONTINUED | OUTPATIENT
Start: 2018-11-14 | End: 2018-11-18 | Stop reason: HOSPADM

## 2018-11-14 RX ORDER — DOCUSATE SODIUM 100 MG/1
100 CAPSULE, LIQUID FILLED ORAL 2 TIMES DAILY
Status: DISCONTINUED | OUTPATIENT
Start: 2018-11-14 | End: 2018-11-18 | Stop reason: HOSPADM

## 2018-11-14 RX ORDER — MAGNESIUM SULFATE HEPTAHYDRATE 40 MG/ML
2 INJECTION, SOLUTION INTRAVENOUS ONCE
Status: COMPLETED | OUTPATIENT
Start: 2018-11-14 | End: 2018-11-14

## 2018-11-14 RX ORDER — DEXTROSE 50 % IN WATER (D50W) INTRAVENOUS SYRINGE
12.5-25 AS NEEDED
Status: DISCONTINUED | OUTPATIENT
Start: 2018-11-14 | End: 2018-11-18 | Stop reason: HOSPADM

## 2018-11-14 RX ORDER — ONDANSETRON 2 MG/ML
4 INJECTION INTRAMUSCULAR; INTRAVENOUS
Status: DISCONTINUED | OUTPATIENT
Start: 2018-11-14 | End: 2018-11-18 | Stop reason: HOSPADM

## 2018-11-14 RX ORDER — ENOXAPARIN SODIUM 100 MG/ML
1 INJECTION SUBCUTANEOUS EVERY 12 HOURS
Status: DISCONTINUED | OUTPATIENT
Start: 2018-11-14 | End: 2018-11-14

## 2018-11-14 RX ORDER — ASPIRIN 81 MG/1
81 TABLET ORAL DAILY
Status: DISCONTINUED | OUTPATIENT
Start: 2018-11-14 | End: 2018-11-18 | Stop reason: HOSPADM

## 2018-11-14 RX ORDER — SODIUM CHLORIDE 9 MG/ML
50 INJECTION, SOLUTION INTRAVENOUS CONTINUOUS
Status: DISCONTINUED | OUTPATIENT
Start: 2018-11-14 | End: 2018-11-17

## 2018-11-14 RX ORDER — IPRATROPIUM BROMIDE AND ALBUTEROL SULFATE 2.5; .5 MG/3ML; MG/3ML
3 SOLUTION RESPIRATORY (INHALATION)
Status: DISCONTINUED | OUTPATIENT
Start: 2018-11-14 | End: 2018-11-14

## 2018-11-14 RX ORDER — ATORVASTATIN CALCIUM 20 MG/1
20 TABLET, FILM COATED ORAL
Status: DISCONTINUED | OUTPATIENT
Start: 2018-11-14 | End: 2018-11-18 | Stop reason: HOSPADM

## 2018-11-14 RX ADMIN — METHYLPREDNISOLONE SODIUM SUCCINATE 40 MG: 40 INJECTION, POWDER, FOR SOLUTION INTRAMUSCULAR; INTRAVENOUS at 21:44

## 2018-11-14 RX ADMIN — Medication 10 ML: at 13:10

## 2018-11-14 RX ADMIN — INSULIN GLARGINE 12 UNITS: 100 INJECTION, SOLUTION SUBCUTANEOUS at 19:03

## 2018-11-14 RX ADMIN — MAGNESIUM SULFATE HEPTAHYDRATE 2 G: 40 INJECTION, SOLUTION INTRAVENOUS at 13:10

## 2018-11-14 RX ADMIN — INSULIN LISPRO 7 UNITS: 100 INJECTION, SOLUTION INTRAVENOUS; SUBCUTANEOUS at 18:32

## 2018-11-14 RX ADMIN — INSULIN LISPRO 7 UNITS: 100 INJECTION, SOLUTION INTRAVENOUS; SUBCUTANEOUS at 13:22

## 2018-11-14 RX ADMIN — ENOXAPARIN SODIUM 100 MG: 100 INJECTION, SOLUTION INTRAVENOUS; SUBCUTANEOUS at 20:36

## 2018-11-14 RX ADMIN — INSULIN LISPRO 2 UNITS: 100 INJECTION, SOLUTION INTRAVENOUS; SUBCUTANEOUS at 21:47

## 2018-11-14 RX ADMIN — LEVOTHYROXINE SODIUM 150 MCG: 150 TABLET ORAL at 13:09

## 2018-11-14 RX ADMIN — SODIUM CHLORIDE 50 ML/HR: 900 INJECTION, SOLUTION INTRAVENOUS at 13:11

## 2018-11-14 RX ADMIN — METOPROLOL TARTRATE 12.5 MG: 25 TABLET ORAL at 21:43

## 2018-11-14 RX ADMIN — GABAPENTIN 600 MG: 300 CAPSULE ORAL at 21:43

## 2018-11-14 RX ADMIN — ATORVASTATIN CALCIUM 20 MG: 20 TABLET, FILM COATED ORAL at 21:43

## 2018-11-14 RX ADMIN — ASPIRIN 81 MG: 81 TABLET, COATED ORAL at 13:08

## 2018-11-14 RX ADMIN — PERFLUTREN 2 ML: 6.52 INJECTION, SUSPENSION INTRAVENOUS at 10:31

## 2018-11-14 RX ADMIN — Medication 10 ML: at 21:45

## 2018-11-14 NOTE — ED NOTES
TRANSFER - OUT REPORT: 
 
Verbal report given to Benito Vo RN on [facility]  being transferred to PCU for transferred care Report consisted of patients Situation, Background, Assessment and  
Recommendations(SBAR). Information from the following report(s) SBAR, ED Summary, Recent Results and Cardiac Rhythm NSR was reviewed with the receiving nurse. Lines:  
Peripheral IV 11/14/18 Right Antecubital (Active) Site Assessment Clean, dry, & intact 11/14/2018  8:27 AM  
Phlebitis Assessment 0 11/14/2018  3:49 AM  
Infiltration Assessment 0 11/14/2018  3:49 AM  
Dressing Status Clean, dry, & intact 11/14/2018  8:27 AM  
Dressing Type Transparent 11/14/2018  8:27 AM  
  
 
Opportunity for questions and clarification was provided. Patient transported with: 
 Monitor O2 @ 2 liters

## 2018-11-14 NOTE — H&P
Hospitalist Admission NoteNAME: Deloris Alexander :  1960 MRN:  [id number] Date/Time:  2018 11:06 AM 
 
Patient PCP: Janee Gonzales MD 
______________________________________________________________________ Assessment & Plan: 
Acute hypoxic respiratory failure, POA 
--unclear etiology. Seems like copd flare by outside records. Patient hypoxic 87% RA. No longer wheezing. Has b/l crackles. Outside CXR without acute process. --?copd exac, ?pulmonary edema since probnp elevated 1370 
--check ABG, CT chest 
--duoneb prn 
--need home med reconciliation  
--levaquin given this am 
 
NSTEMI trop 2.59, now trending down 
--no CP but had SOB and n/v earlier. --lovenox 1mg/kg. Aspirin. Check fasting lipid --cardiology consulted. Echo pending 
--start metoprolol 12.5mg bid Epigastric pain, hx pancreatitis 3/18 LLQ pain N/V earlier, resolved 
--UA neg infection. Clear diabetic liquid diet for now. --check lipase 
--CT abdomen ANN Cr 1.49. ? prerenal.   
--CT abdomen will look for any obstructive uropathy Acute encephalopathy, POA Lethargic 
--patient poor historian. Does not know month or time but is able to some answer questions 
--baseline unknown --check ABG for hypercapnea IDDM 
--continue lantus. High dose SSI. Hypothyroid --continue levothyroxine Morbid obesity B/l venous stasis Body mass index is 42.51 kg/m². Code:  Presumed full code since patient not reliable, not clear she has capacity at this time with her lethargy. Says she would not want intubation but was full code 3/18. No advance directive on file. DVT prophylaxis: lovenox Surrogate decision maker:  Sister Thaddeus Roberts Subjective: CHIEF COMPLAINT:  Nausea, vomiting HISTORY OF PRESENT ILLNESS:    
Deloris Alexander is a 59 y.o.    female with DM, DKA 3/18, COPD, tobacco smoker, HTN, morbid obesity who is transferred from \[facility]\"" ER to [facility] ER.   
 Patient lethargic, oriented to self, poor historian. Report going to outside ER due to nausea and vomiting, nonproductive cough, wheezing, followed by SOB, sorethroat, started last night. Denies having chest pain at any time. Denies abdominal pain, diarrhea. No fever or chills. At outside ER, patient noted to be in respiratory distress, wheezing, alert and oriented x 3. Found to have trop 2.59. Patient given aspirin, zofran, solumedrol, duoneb x 1 at outside ER. On arrival here 87% RA, 93% on 2L. Given levaquin. Last admitted 3/18 with DKA, pancreatitis due to DKA, danii on ckd 3, acute gout flare. We were asked to admit for work up and evaluation of the above problems. Past Medical History:  
Diagnosis Date  Arthritis  Asthma  Bronchitis  GERD (gastroesophageal reflux disease)  Hypercholesterolemia  Hypertension  Hypothyroid  IDDM (insulin dependent diabetes mellitus) ([facility] [address]  Morbid obesity ([facility] [address] [facility]  Peripheral neuropathy  Thyroid disease  Venous insufficiency Past Surgical History:  
Procedure Laterality Date  HX AMPUTATION Bilateral   
 toes  HX COLONOSCOPY   Social History Tobacco Use  Smoking status: Current Every Day Smoker Packs/day: 0.50 Types: Cigarettes Last attempt to quit: 2012 Years since quittin.9  Smokeless tobacco: Never Used Substance Use Topics  Alcohol use: Yes Comment: social  
Lives alone, walker Family History Problem Relation Age of Onset  COPD Father  Alcohol abuse Father No Known Allergies Prior to Admission medications Medication Sig Start Date End Date Taking? Authorizing Provider  
potassium chloride (K-DUR, KLOR-CON) 20 mEq tablet Take 20 mEq by mouth every other day. Alternates day with torsemide   Yes Provider, Historical  
torsemide (DEMADEX) 20 mg tablet Take 20 mg by mouth every other day. Alternates administration w/ potassium supplement   Yes Provider, Historical  
silver sulfADIAZINE (SILVADENE) 1 % topical cream Apply 1 Each to affected area every Monday, Wednesday, Friday. Apply to legs   Yes Provider, Historical  
zinc 50 mg tab tablet Take 50 mg by mouth daily. 3/23/18  Yes Provider, Historical  
insulin aspart U-100 (NOVOLOG FLEXPEN U-100 INSULIN) 100 unit/mL inpn 5 Units by SubCUTAneous route Before breakfast, lunch, and dinner. + SSI TIDAC: 
140-199=2 u           
200-249=3 u 
250-299=5 u 
300-349=8 u 
Patient taking differently: 5 Units by SubCUTAneous route Before breakfast, lunch, and dinner. Sliding Scale  
under none 150-200 5 units 201-250 10 units 251-300 15 units 301-350 20 units 351-400 25 units 
over 400 call MD 3/20/18  Yes Hector Benavides MD  
levothyroxine (SYNTHROID) 150 mcg tablet Take 150 mcg by mouth Daily (before breakfast). Yes Provider, Historical  
gabapentin (NEURONTIN) 300 mg capsule Take 600 mg by mouth nightly. Yes Provider, Historical  
insulin glargine (LANTUS) 100 unit/mL injection 12 Units by SubCUTAneous route daily (after dinner). Patient takes at 1830 each day   Yes Provider, Historical  
ipratropium-albuterol (COMBIVENT RESPIMAT)  mcg/actuation inhaler Take 1 Puff by inhalation every six (6) hours as needed for Shortness of Breath. Yes Provider, Historical  
aspirin delayed-release 81 mg tablet Take 81 mg by mouth daily. Yes Provider, Historical  
 
REVIEW OF SYSTEMS:  POSITIVE= Bold. Negative = normal text General:  fever, chills, sweats, generalized weakness, weight loss/gain, loss of appetite Eyes:  blurred vision, eye pain, loss of vision, diplopia Ear Nose and Throat:  rhinorrhea, pharyngitis Respiratory:   cough, sputum production, SOB, wheezing, RAMIREZ, pleuritic pain 
Cardiology:  chest pain, palpitations, orthopnea, PND, edema, syncope Gastrointestinal:  abdominal pain, N/V, dysphagia, diarrhea, constipation, bleeding Genitourinary:  frequency, urgency, dysuria, hematuria, incontinence Muskuloskeletal :  arthralgia, myalgia Hematology:  easy bruising, bleeding, lymphadenopathy Dermatological:  rash, ulceration, pruritis Endocrine:  hot flashes or polydipsia Neurological:  headache, dizziness, confusion, focal weakness, paresthesia, memory loss, gait disturbance Psychological: anxiety, depression, agitation Objective: VITALS:   
Visit Vitals /73 Pulse 82 Temp 98.8 °F (37.1 °C) Resp 16 Ht 5' 1\" (1.549 m) Wt 102.1 kg (225 lb) SpO2 91% BMI 42.51 kg/m² Temp (24hrs), Av °F (36.7 °C), Min:97.5 °F (36.4 °C), Max:98.8 °F (37.1 °C) Body mass index is 42.51 kg/m². PHYSICAL EXAM: 
 
General:    Obese, lethargic, keep eyes closed, answer some questions, no distress, appears stated age. HEENT: Atraumatic, anicteric sclerae, pink conjunctivae No oral ulcers, mucosa moist, throat clear. Hearing intact. Neck:  Supple, symmetrical,  thyroid: non tender Lungs:   Crackles in bases b/l. No Wheezing or Rhonchi. . 
Chest wall:  No tenderness  No Accessory muscle use. Heart:   Regular  rhythm,  No  murmur   No gallop. Trace edema. Abdomen:   Obese, Soft, epigastric and LLQ tender, no guarding. Not distended. Bowel sounds normal. No masses Extremities: No cyanosis. No clubbing Skin:     Not pale Not Jaundiced  Bruising with some linear excoriations in left lower leg, chronic venous stasis changes b/l Psych:  Poor insight. Not depressed. Not anxious or agitated. Neurologic: Not opening eyes. No facial asymmetry. No aphasia or slurred speech. Not cooperating with strength testing, lethargic and oriented X self, somewhat to situation, not to time IMAGING RESULTS: 
 []       I have personally reviewed the actual   []     CXR  []     CT scan CXR: 
CT : 
EKG:  SR 76, RBBB, deep TWI V2-V5 and inferiorly that improved on repeat ekg ________________________________________________________________________ Care Plan discussed with: 
  Comments Patient Family RN Care Manager Consultant:     
________________________________________________________________________ Prophylaxis: 
GI none DVT lovenox  
________________________________________________________________________ Recommended Disposition:  
Home with Family y HH/PT/OT/RN y  
SNF/LTC   
FAMILIA   
________________________________________________________________________ Code Status: 
Full Code y  
DNR/DNI   
________________________________________________________________________ TOTAL TIME:  60 minutes Comments  
 y Reviewed previous records  
 
______________________________________________________________________ Joel Sam, MD 
 
 
Procedures: see electronic medical records for all procedures/Xrays and details which were not copied into this note but were reviewed prior to creation of Plan. LAB DATA REVIEWED:   
Recent Results (from the past 24 hour(s)) METABOLIC PANEL, COMPREHENSIVE Collection Time: 11/14/18  3:36 AM  
Result Value Ref Range Sodium 140 136 - 145 mmol/L Potassium 4.7 3.5 - 5.1 mmol/L Chloride 98 97 - 108 mmol/L  
 CO2 31 21 - 32 mmol/L Anion gap 11 5 - 15 mmol/L Glucose 205 (H) 65 - 100 mg/dL BUN 33 (H) 6 - 20 MG/DL Creatinine 1.49 (H) 0.55 - 1.02 MG/DL  
 BUN/Creatinine ratio 22 (H) 12 - 20 GFR est AA 43 (L) >60 ml/min/1.73m2 GFR est non-AA 36 (L) >60 ml/min/1.73m2 Calcium 9.0 8.5 - 10.1 MG/DL Bilirubin, total 0.7 0.2 - 1.0 MG/DL  
 ALT (SGPT) 20 12 - 78 U/L  
 AST (SGOT) 28 15 - 37 U/L Alk. phosphatase 136 (H) 45 - 117 U/L Protein, total 6.6 6.4 - 8.2 g/dL Albumin 3.2 (L) 3.5 - 5.0 g/dL Globulin 3.4 2.0 - 4.0 g/dL A-G Ratio 0.9 (L) 1.1 - 2.2    
CBC WITH AUTOMATED DIFF Collection Time: 11/14/18  3:36 AM  
Result Value Ref Range WBC 10.7 3.6 - 11.0 K/uL RBC 5.35 (H) 3.80 - 5.20 M/uL  
 HGB 12.8 11.5 - 16.0 g/dL HCT 43.3 35.0 - 47.0 % MCV 80.9 80.0 - 99.0 FL  
 MCH 23.9 (L) 26.0 - 34.0 PG  
 MCHC 29.6 (L) 30.0 - 36.5 g/dL  
 RDW 17.5 (H) 11.5 - 14.5 % PLATELET 779 081 - 868 K/uL MPV 11.0 8.9 - 12.9 FL  
 NRBC 0.0 0  WBC ABSOLUTE NRBC 0.00 0.00 - 0.01 K/uL NEUTROPHILS 86 (H) 32 - 75 % LYMPHOCYTES 8 (L) 12 - 49 % MONOCYTES 5 5 - 13 % EOSINOPHILS 0 0 - 7 % BASOPHILS 0 0 - 1 % IMMATURE GRANULOCYTES 1 (H) 0.0 - 0.5 % ABS. NEUTROPHILS 9.2 (H) 1.8 - 8.0 K/UL  
 ABS. LYMPHOCYTES 0.8 0.8 - 3.5 K/UL  
 ABS. MONOCYTES 0.6 0.0 - 1.0 K/UL  
 ABS. EOSINOPHILS 0.0 0.0 - 0.4 K/UL  
 ABS. BASOPHILS 0.0 0.0 - 0.1 K/UL  
 ABS. IMM. GRANS. 0.1 (H) 0.00 - 0.04 K/UL  
 DF AUTOMATED MAGNESIUM Collection Time: 11/14/18  3:36 AM  
Result Value Ref Range Magnesium 1.7 1.6 - 2.4 mg/dL TROPONIN I Collection Time: 11/14/18  3:36 AM  
Result Value Ref Range Troponin-I, Qt. 2.59 (H) <0.05 ng/mL BNP Collection Time: 11/14/18  3:36 AM  
Result Value Ref Range BNP 1,370 (H) 0 - 100 pg/mL PROTHROMBIN TIME + INR Collection Time: 11/14/18  3:36 AM  
Result Value Ref Range INR 1.0 0.9 - 1.1 Prothrombin time 10.1 9.0 - 11.1 sec PTT Collection Time: 11/14/18  3:36 AM  
Result Value Ref Range aPTT 26.4 22.1 - 32.0 sec  
 aPTT, therapeutic range     58.0 - 77.0 SECS  
LIPASE Collection Time: 11/14/18  3:36 AM  
Result Value Ref Range Lipase 59 (L) 73 - 393 U/L  
LACTIC ACID Collection Time: 11/14/18  3:36 AM  
Result Value Ref Range Lactic acid 1.0 0.4 - 2.0 MMOL/L  
EKG, 12 LEAD, INITIAL Collection Time: 11/14/18  3:48 AM  
Result Value Ref Range Ventricular Rate 77 BPM  
 Atrial Rate 77 BPM  
 P-R Interval 174 ms QRS Duration 112 ms  
 Q-T Interval 462 ms QTC Calculation (Bezet) 522 ms Calculated P Axis 81 degrees Calculated R Axis 138 degrees Calculated T Axis -61 degrees Diagnosis Normal sinus rhythm Possible Left atrial enlargement Pulmonary disease pattern Right bundle branch block Septal infarct , age undetermined T wave abnormality, consider inferolateral ischemia Abnormal ECG When compared with ECG of 15-MAR-2018 13:34, 
Questionable change in QRS duration Septal infarct is now present INFLUENZA A & B AG (RAPID TEST) Collection Time: 11/14/18  3:51 AM  
Result Value Ref Range Influenza A Antigen NEGATIVE  NEG Influenza B Antigen NEGATIVE  NEG    
URINALYSIS W/ RFLX MICROSCOPIC Collection Time: 11/14/18  3:57 AM  
Result Value Ref Range Color YELLOW/STRAW Appearance CLEAR CLEAR Specific gravity 1.020 1.003 - 1.030    
 pH (UA) 5.5 5.0 - 8.0 Protein 100 (A) NEG mg/dL Glucose NEGATIVE  NEG mg/dL Ketone 80 (A) NEG mg/dL Bilirubin NEGATIVE  NEG Blood MODERATE (A) NEG Urobilinogen 0.2 0.2 - 1.0 EU/dL Nitrites NEGATIVE  NEG Leukocyte Esterase NEGATIVE  NEG    
URINE MICROSCOPIC ONLY Collection Time: 11/14/18  3:57 AM  
Result Value Ref Range WBC 0-4 0 - 4 /hpf  
 RBC 0-5 0 - 5 /hpf Epithelial cells FEW FEW /lpf Bacteria NEGATIVE  NEG /hpf Amorphous Crystals 2+ (A) NEG  
TROPONIN I Collection Time: 11/14/18  5:30 AM  
Result Value Ref Range Troponin-I, Qt. 2.28 (H) <0.05 ng/mL TROPONIN I Collection Time: 11/14/18  8:41 AM  
Result Value Ref Range Troponin-I, Qt. 1.61 (H) <0.05 ng/mL INFLUENZA A & B AG (RAPID TEST) Collection Time: 11/14/18  8:46 AM  
Result Value Ref Range Influenza A Antigen NEGATIVE  NEG Influenza B Antigen NEGATIVE  NEG    
EKG, 12 LEAD, SUBSEQUENT Collection Time: 11/14/18  8:58 AM  
Result Value Ref Range Ventricular Rate 80 BPM  
 Atrial Rate 80 BPM  
 P-R Interval 170 ms QRS Duration 120 ms  
 Q-T Interval 442 ms QTC Calculation (Bezet) 509 ms Calculated P Axis 75 degrees Calculated R Axis 162 degrees Calculated T Axis -63 degrees Diagnosis Normal sinus rhythm Low voltage QRS Right bundle branch block Cannot rule out Anteroseptal infarct , age undetermined T wave abnormality, consider inferolateral ischemia No previous ECGs available GLUCOSE, POC Collection Time: 11/14/18  9:58 AM  
Result Value Ref Range Glucose (POC) 252 (H) 65 - 100 mg/dL Performed by Holly Chin \"Ayush\"

## 2018-11-14 NOTE — ED PROVIDER NOTES
EMERGENCY DEPARTMENT HISTORY AND PHYSICAL EXAM 
 
 
Date: 11/14/2018 Patient Name: Adalberto Chin History of Presenting Illness Chief Complaint Patient presents with  Shortness of Breath History Provided By: Patient and Records HPI: Adalberto Chin, 59 y.o. female with PMHx significant for COPD, DM, and HTN, presents via EMS from [facility] to the ED with cc of SOB, nausea, and vomiting since ~0330 today. Pt also reports sore throat and fever since onset. Per records, pt had SpO2 in upper ~80s at [facility], had troponin ~2.7 on lab work, and had negative CXR. Pt was given  mg, Lovenox, Levaquin, IV steroids, IVF, and nebulizer treatment prior to transfer. At time of examination, pt reports she had not vomited since leaving [facility]. She specifically denies any chills, chest pain, headache, rash, diarrhea, sweating or weight loss. There are no other complaints, changes, or physical findings at this time. PCP: Maci Mccauley MD 
 
Current Outpatient Medications Medication Sig Dispense Refill  atorvastatin (LIPITOR) 20 mg tablet Take 1 Tab by mouth nightly for 30 days. 30 Tab 0  
 metoprolol tartrate (LOPRESSOR) 25 mg tablet Take 0.5 Tabs by mouth every twelve (12) hours for 30 days. 30 Tab 0  
 potassium chloride (K-DUR, KLOR-CON) 20 mEq tablet Take 20 mEq by mouth every other day. Alternates day with torsemide  torsemide (DEMADEX) 20 mg tablet Take 20 mg by mouth every other day. Alternates administration w/ potassium supplement  silver sulfADIAZINE (SILVADENE) 1 % topical cream Apply 1 Each to affected area every Monday, Wednesday, Friday. Apply to legs  zinc 50 mg tab tablet Take 50 mg by mouth daily.     
 insulin aspart U-100 (NOVOLOG FLEXPEN U-100 INSULIN) 100 unit/mL inpn 5 Units by SubCUTAneous route Before breakfast, lunch, and dinner. + SSI TIDAC: 
140-199=2 u           
200-249=3 u 
250-299=5 u 
300-349=8 u (Patient taking differently: 5 Units by SubCUTAneous route Before breakfast, lunch, and dinner. Sliding Scale  
under none 150-200 5 units 201-250 10 units 251-300 15 units 301-350 20 units 351-400 25 units 
over 400 call MD) 3 Adjustable Dose Pre-filled Pen Syringe 0  
 levothyroxine (SYNTHROID) 150 mcg tablet Take 150 mcg by mouth Daily (before breakfast).  gabapentin (NEURONTIN) 300 mg capsule Take 600 mg by mouth nightly.  insulin glargine (LANTUS) 100 unit/mL injection 12 Units by SubCUTAneous route daily (after dinner). Patient takes at 1830 each day  ipratropium-albuterol (COMBIVENT RESPIMAT)  mcg/actuation inhaler Take 1 Puff by inhalation every six (6) hours as needed for Shortness of Breath.  aspirin delayed-release 81 mg tablet Take 81 mg by mouth daily. Past History Past Medical History: 
Past Medical History:  
Diagnosis Date  Arthritis  Asthma  Bronchitis  GERD (gastroesophageal reflux disease)  Hypercholesterolemia  Hypertension  Hypothyroid  IDDM (insulin dependent diabetes mellitus) ([facility]  Morbid obesity ([facility]  Peripheral neuropathy  Thyroid disease  Venous insufficiency Past Surgical History: 
Past Surgical History:  
Procedure Laterality Date  HX AMPUTATION Bilateral   
 toes  HX COLONOSCOPY   Family History: 
Family History Problem Relation Age of Onset  COPD Father  Alcohol abuse Father Social History: 
Social History Tobacco Use  Smoking status: Current Every Day Smoker Packs/day: 0.50 Types: Cigarettes Last attempt to quit: 2012 Years since quittin.9  Smokeless tobacco: Never Used Substance Use Topics  Alcohol use: Yes Comment: social  
 Drug use: No  
 
 
Allergies: 
No Known Allergies Review of Systems Review of Systems Constitutional: Positive for fever. Negative for activity change, appetite change, chills, fatigue and unexpected weight change. HENT: Positive for sore throat. Negative for congestion, hearing loss, rhinorrhea, sneezing and voice change. Eyes: Negative. Negative for pain and visual disturbance. Respiratory: Positive for shortness of breath. Negative for apnea, cough, choking and chest tightness. Cardiovascular: Negative. Negative for chest pain and palpitations. Gastrointestinal: Positive for nausea and vomiting. Negative for abdominal distention, abdominal pain, blood in stool and diarrhea. Genitourinary: Negative. Negative for difficulty urinating, flank pain, frequency and urgency. No discharge Musculoskeletal: Negative. Negative for arthralgias, back pain, myalgias and neck stiffness. Skin: Negative. Negative for color change and rash. Neurological: Negative. Negative for dizziness, seizures, syncope, speech difficulty, weakness, numbness and headaches. Hematological: Negative for adenopathy. Psychiatric/Behavioral: Negative. Negative for agitation, behavioral problems, dysphoric mood and suicidal ideas. The patient is not nervous/anxious. Physical Exam  
Physical Exam  
Constitutional: She is oriented to person, place, and time. She appears well-developed and well-nourished. No distress. HENT:  
Head: Normocephalic and atraumatic. Mouth/Throat: Oropharynx is clear and moist. No oropharyngeal exudate. Eyes: Conjunctivae and EOM are normal. Pupils are equal, round, and reactive to light. Right eye exhibits no discharge. Left eye exhibits no discharge. Neck: Normal range of motion. Neck supple. Cardiovascular: Normal rate, regular rhythm and intact distal pulses. Exam reveals no gallop and no friction rub. No murmur heard. Pulmonary/Chest: Effort normal and breath sounds normal. No respiratory distress. She has no wheezes. She has no rales. She exhibits no tenderness. Abdominal: Soft.  Bowel sounds are normal. She exhibits no distension and no mass. There is no tenderness. There is no rebound and no guarding. Musculoskeletal: Normal range of motion. Diffuse B/L lower extremity edema Lymphadenopathy:  
  She has no cervical adenopathy. Neurological: She is alert and oriented to person, place, and time. No cranial nerve deficit. Coordination normal.  
Skin: Skin is warm and dry. No rash noted. No erythema. Psychiatric: She has a normal mood and affect. Nursing note and vitals reviewed. Diagnostic Study Results Labs - No results found for this or any previous visit (from the past 12 hour(s)). Medical Decision Making I am the first provider for this patient. I reviewed the vital signs, available nursing notes, past medical history, past surgical history, family history and social history. Vital Signs-Reviewed the patient's vital signs. No data found. Pulse Oximetry Analysis - 87% on RA Cardiac Monitor:  
Rate: 76 bpm 
Rhythm: Sinus Rhythm EKG interpretation: 08:32 Rhythm: sinus rhythm with marked sinus arrhythmia. Rate (approx.): 76; Axis: normal; ST/T wave: T wave abnormality, consider inferolateral ischemia. Records Reviewed: Nursing Notes, Old Medical Records, Previous electrocardiograms, Ambulance Run Sheet, Previous Radiology Studies and Previous Laboratory Studies Provider Notes (Medical Decision Making): DDx: ACS, arrhythmia, influenza, PNA 
 
ED Course:  
Initial assessment performed. The patients presenting problems have been discussed, and they are in agreement with the care plan formulated and outlined with them. I have encouraged them to ask questions as they arise throughout their visit. Consult Note: 
8:32 AM 
No att. providers found spoke with NP Steven Rogers MD 
Specialty: Cardiology Discussed pts hx, disposition, and available diagnostic and imaging results. Reviewed care plans. Consultant agrees with plans as outlined. Will see pt in ED, request repeat labs and EKG and advise admission to Hospitalist. 
 
8:38 AM 
Pt with new EKG changes compared to old EKG, with inverted T waves septally. CONSULT NOTE:  
9:29 AM 
Elia Hsieh MD spoke with Stella Pitts MD, Specialty: Hospitalist 
Discussed pt's hx, disposition, and available diagnostic and imaging results. Reviewed care plans. Consultant will evaluate pt for admission. Critical Care Time: CRITICAL CARE NOTE : 
 
9:29 AM 
 
IMPENDING DETERIORATION -Cardiovascular and Metabolic ASSOCIATED RISK FACTORS - Hypoxia, Dysrhythmia and Metabolic changes MANAGEMENT- Bedside Assessment and Supervision of Care INTERPRETATION -  ECG, Blood Pressure and Cardiac Output Measures INTERVENTIONS - Metobolic interventions CASE REVIEW - Hospitalist and Medical Sub-Specialist 
TREATMENT RESPONSE -Stable PERFORMED BY - Self I have spent 45 minutes of critical care time involved in lab review, consultations with specialist, family decision- making, bedside attention and documentation. During this entire length of time I was immediately available to the patient . Elia Hsieh MD 
 
Disposition: 
9:29 AM 
Patient is being admitted to the hospital.  The results of their tests and reasons for their admission have been discussed with them and/or available family. They convey agreement and understanding for the need to be admitted and for their admission diagnosis. Consultation has been made with the inpatient physician specialist for hospitalization. PLAN: 
1. Admit to Hospitalist 
 
Diagnosis Clinical Impression: 1. COPD exacerbation ([address] 2. NSTEMI (non-ST elevated myocardial infarction) ([address] 3. Acute respiratory failure with hypoxia ([address] Attestations: This note is prepared by Gladys Jones, acting as Scribe for No att. providers found. No att. providers found:  The scribe's documentation has been prepared under my direction and personally reviewed by me in its entirety. I confirm that the note above accurately reflects all work, treatment, procedures, and medical decision making performed by me.

## 2018-11-14 NOTE — PROGRESS NOTES
TRANSFER - IN REPORT: 
 
Verbal report received from [facility] (name) on Graeme Olszewski  being received from ED (unit) for routine progression of care Report consisted of patients Situation, Background, Assessment and  
Recommendations(SBAR). Information from the following report(s) SBAR and Kardex was reviewed with the receiving nurse. Opportunity for questions and clarification was provided.

## 2018-11-14 NOTE — PROGRESS NOTES
Pharmacy Medication Reconciliation The patient was interviewed regarding current PTA medication list, use and drug allergies;  patient only was present in room and obtained permission from patient to discuss drug regimen with visitor(s) present. The patient was questioned regarding use of any other inhalers, topical products, over the counter medications, herbal medications, vitamin products or ophthalmic/nasal/otic medication use. Allergy Update: [facility] Recommendations/Findings: The following amendments were made to the patient's active medication list on file at [facility]:  
1) Additions: none 2) Deletions: none 3) Changes:  
Some changes to formulations, all other meds unchanged. All updates reflected in PTA med list. 
 
 
-Clarified PTA med list with patient interview, and Rx insurance query. PTA medication list was corrected to the following:  
 
Prior to Admission Medications Prescriptions Last Dose Informant Patient Reported? Taking?  
aspirin delayed-release 81 mg tablet 2018 at 0900 Parent Yes Yes Sig: Take 81 mg by mouth daily. gabapentin (NEURONTIN) 300 mg capsule 2018 at 2100 Parent Yes Yes Sig: Take 600 mg by mouth nightly. insulin aspart U-100 (NOVOLOG FLEXPEN U-100 INSULIN) 100 unit/mL inpn 2018 at Unknown time Self No Yes Si Units by SubCUTAneous route Before breakfast, lunch, and dinner. + SSI TIDAC: 
140-199=2 u           
200-249=3 u 
250-299=5 u 
300-349=8 u  
Patient taking differently: 5 Units by SubCUTAneous route Before breakfast, lunch, and dinner. Sliding Scale  
under none 150-200 5 units 201-250 10 units 251-300 15 units 301-350 20 units 351-400 25 units 
over 400 call MD  
insulin glargine (LANTUS) 100 unit/mL injection 2018 at Unknown time Parent Yes Yes Si Units by SubCUTAneous route daily (after dinner).  Patient takes at 1830 each day  
ipratropium-albuterol (COMBIVENT RESPIMAT)  mcg/actuation inhaler 11/7/2018 at Unknown time Parent Yes Yes Sig: Take 1 Puff by inhalation every six (6) hours as needed for Shortness of Breath. levothyroxine (SYNTHROID) 150 mcg tablet 11/13/2018 at Unknown time Parent Yes Yes Sig: Take 150 mcg by mouth Daily (before breakfast). potassium chloride (K-DUR, KLOR-CON) 20 mEq tablet   Yes Yes Sig: Take 20 mEq by mouth every other day. Alternates day with torsemide  
silver sulfADIAZINE (SILVADENE) 1 % topical cream 11/12/2018 Self Yes Yes Sig: Apply 1 Each to affected area every Monday, Wednesday, Friday. Apply to legs  
torsemide (DEMADEX) 20 mg tablet  Self Yes Yes Sig: Take 20 mg by mouth every other day. Alternates administration w/ potassium supplement  
zinc 50 mg tab tablet 11/13/2018 at 0900 Self Yes Yes Sig: Take 50 mg by mouth daily. Facility-Administered Medications: None Thank you, TRISH Villalta

## 2018-11-14 NOTE — PROGRESS NOTES
1816 : TRANSFER - IN REPORT: 
 
Verbal report received from [facility] (name) on Ethyl Isaias  being received from ED(unit) for routine progression of care Report consisted of patients Situation, Background, Assessment and  
Recommendations(SBAR). Information from the following report(s) SBAR, Kardex, [place] and Recent Results was reviewed with the receiving nurse. Opportunity for questions and clarification was provided. Assessment completed upon patients arrival to unit and care assumed. 1845 : Patient to room 2244. Primary Nurse Morgan Miner RN and Melony Leon RN performed a dual skin assessment on this patient Impairment noted- see wound doc flow sheet Cyrus score is 18. Breakdown noted to gluteal cleft, scars from old venous stasis ulcers noted to BLE, patient stated she has several blisters on both legs, some have burst.  
 
At time of arrival, patient stated she had a walker in ED. ER nurse [facility] to check in room. 1933 : Report given to Ximena Saini RN. SBAR, Kardex, Intake/Output, MAR or Recent Results were discussed. Ximena Saini RN assumed care of the pt.  
 
Morgan Miner RN

## 2018-11-14 NOTE — PROGRESS NOTES
Called for abnormal ABGs results with respiratory acidosis.  Will start BiPAP and repeat ABGs in 2 hours and transfer patient to PCU

## 2018-11-14 NOTE — CONSULTS
[address] [place] [address] [facility]     Date of  Admission: 11/14/2018  8:10 AM     Admission type:Emergency    Consult for: Elevated Troponins  Consult by: Hospitalist     Subjective:     Adalberto Chin is a 59 y.o. female admitted for Acute respiratory failure with hypoxia (HCC)  NSTEMI (non-ST elevated myocardial infarction) ([facility]  COPD exacerbation ([address] Patient complains of  dyspnea, fatigue, nausea, and vomiting. Previous treatment/evaluation includes lexiscan. Patient transferred from [facility] for elevated troponins and EKG changes. PMH significant for COPD, IDDM, venous stasis, peripheral neuropathy, and HTN. Patient states that she had not been feeling well and was having shortness of breath. Patient states that she was also nauseous and admits to vomiting. She states that she always knows she is sick when she vomits. She also states she has had a sore throat and had a fever today. Patient had a Sandra Bless done in 2013 that showed EF 65% and small reversible inferior defect. Patient has seen Dr. Kadi Hickman, but not since 9/2013. When asked how she gets her medications, she states that she gets them from the pharmacy. Denies having gone to the doctor in the last few years.       Cardiac risk factors: smoking/ tobacco exposure, hypertension, diabetes mellitus, obesity, sedentary life style, stress, post-menopausal.      Patient Active Problem List    Diagnosis Date Noted    COPD exacerbation ([facility] [facility] 11/14/2018    NSTEMI (non-ST elevated myocardial infarction) ([address] 11/14/2018    Acute respiratory failure with hypoxia ([address] 11/14/2018    DKA (diabetic ketoacidoses) ([address] 03/15/2018    ANN (acute kidney injury) ([address] 03/15/2018    Venous stasis dermatitis 04/25/2014    Venous stasis syndrome 04/25/2014    IDDM (insulin dependent diabetes mellitus) ([address]     Morbid obesity ([address]     Venous insufficiency       Price Rosales MD  Past Medical History:   Diagnosis Date    Arthritis     Asthma     Bronchitis     GERD (gastroesophageal reflux disease)     Hypercholesterolemia     Hypertension     Hypothyroid     IDDM (insulin dependent diabetes mellitus) ([facility]     Morbid obesity (HCC)     Peripheral neuropathy     Thyroid disease     Venous insufficiency       Social History     Socioeconomic History    Marital status: SINGLE     Spouse name: Not on file    Number of children: Not on file    Years of education: Not on file    Highest education level: Not on file   Social Needs    Financial resource strain: Not on file    Food insecurity - worry: Not on file    Food insecurity - inability: Not on file   [employer] needs - medical: Not on file   [employer] needs - non-medical: Not on file   Occupational History    Not on file   Tobacco Use    Smoking status: Current Every Day Smoker     Packs/day: 0.50     Types: Cigarettes     Last attempt to quit: 2012     Years since quittin.9    Smokeless tobacco: Never Used   Substance and Sexual Activity    Alcohol use: Yes     Comment: social    Drug use: No    Sexual activity: Not on file   Other Topics Concern    Not on file   Social History Narrative    Not on file     No Known Allergies   Family History   Problem Relation Age of Onset    COPD Mother     COPD Father       No current facility-administered medications for this encounter. Current Outpatient Medications   Medication Sig    torsemide (DEMADEX) 20 mg tablet Take 20 mg by mouth every other day.  POTASSIUM CHLORIDE PO Take 20 mEq by mouth every other day.  silver sulfADIAZINE (SILVADENE) 1 % topical cream Apply 1 Each to affected area two (2) days a week.  zinc 50 mg tab tablet Take 50 mg by mouth daily.     insulin aspart U-100 (NOVOLOG FLEXPEN U-100 INSULIN) 100 unit/mL inpn 5 Units by SubCUTAneous route Before breakfast, lunch, and dinner. + SSI TIDAC:  140-199=2 P            200-249=3 u  250-299=5 u  300-349=8 u (Patient taking differently: 5 Units by SubCUTAneous route Before breakfast, lunch, and dinner. Sliding Scale   under none  150-200 5 units  201-250 10 units  251-300 15 units  301-350 20 units  351-400 25 units  over 400 call MD)    levothyroxine (SYNTHROID) 150 mcg tablet Take 150 mcg by mouth Daily (before breakfast).  gabapentin (NEURONTIN) 300 mg capsule Take 600 mg by mouth nightly.  insulin glargine (LANTUS) 100 unit/mL injection 8 Units by SubCUTAneous route nightly.  ipratropium-albuterol (COMBIVENT RESPIMAT)  mcg/actuation inhaler Take 1 Puff by inhalation every six (6) hours as needed for Shortness of Breath.  aspirin delayed-release 81 mg tablet Take 81 mg by mouth daily. Review of Symptoms:   11 systems reviewed, negative other than as stated in the HPI        Objective:      Visit Vitals  /65 (BP 1 Location: Left arm, BP Patient Position: At rest)   Pulse 75   Temp 98.8 °F (37.1 °C)   Resp 20   Ht 5' 1\" (1.549 m)   Wt 225 lb (102.1 kg)   SpO2 (!) 87%   BMI 42.51 kg/m²       Physical:   General: Obese  female in no acute distress. Alert and oriented x3. Flat affect. Appears older than stated age. Heart: RRR, no m/S3/JVD, no carotid bruits   Lungs: clear   Abdomen: Soft, +BS, NTND   Extremities: LE cliff +DP/PT, erythema noted to LLE. Nonpitting edema (tight) to bilateral lower extremities. Amputation of a toe on bilateral feet.      Neurologic: Grossly normal    Data Review:   Recent Labs     11/14/18 0336   WBC 10.7   HGB 12.8   HCT 43.3        Recent Labs     11/14/18 0336      K 4.7   CL 98   CO2 31   *   BUN 33*   CREA 1.49*   CA 9.0   MG 1.7   ALB 3.2*   TBILI 0.7   SGOT 28   ALT 20   INR 1.0       Recent Labs     11/14/18  0530 11/14/18 0336   TROIQ 2.28* 2.59*       No intake or output data in the 24 hours ending 11/14/18 0963 Cardiographics    Telemetry: Sinus rhythm   ECG: Sinus rhythm, inverted T waves septally    Echocardiogram: Pending    CXRAY:No evidence of acute cardiopulmonary process. No significant change  from the prior study. 8/2013:  Small reversible inferior defect, EF 65%     Assessment:      59year old female presenting with fatigue, shortness of breath, and nausea and vomiting. Elevated troponins of 2.59 and 2.28. EKG shows changes of inverted T waves septally when compared to old EKG. Mita Ni from 2013 showed small reversible inferior defect and EF 65%. Patient has not had any follow-up with cardiology or PCP. Active Problems:    COPD exacerbation ([facility] (11/14/2018)      NSTEMI (non-ST elevated myocardial infarction) ([facility] (11/14/2018)      Acute respiratory failure with hypoxia ([facility] (11/14/2018)         Plan:     NSTEMI:  Elevated troponin 2.58. EKG shows changes of inverted T waves septally when compared to old EKG. Start ASA and lovenox 1mg/kg BID. Repeat troponin and EKG now. ECHO ordered STAT.  NPO at midnight. Plan for cardiac cath tomorrow. Will start statin and check lipid panel in AM.   Cr 1.49. Will start IV fluids at 50 ml/hr for hydration until ECHO is completed and EF is known. Check TSH and HgbA1C in AM.     Iris Mcginnis     Agree with NP student assessment and plan. Supplementing Mg and will recheck labs in [facility]    11/14/2018         Patient seen, examined by me personally. Plan discussed as detailed. Agree with note as outlined by  NP. I confirm findings in history and physical exam. No additional findings noted. Agree with plan as outlined above. She denies any chest pain. 2-3 weeks of SOB, edema. Has anna-lateral ischemia on her EKG. Medical treatment of NSTEMI for now. Check echo. Hydrate. Cath/PCI tomorrow if she remains afebrile.     Kieran Ortiz MD

## 2018-11-14 NOTE — ED NOTES
TRANSFER - IN REPORT: 
 
Verbal report received from ***(name) on Felisha Daniels  being received from ***(unit) for {TRANSFER CARE:18514} Report consisted of patients Situation, Background, Assessment and  
Recommendations(SBAR). Information from the following report(s) {SBAR REPORTS YJSL:93551} was reviewed with the receiving nurse. Opportunity for questions and clarification was provided. Assessment completed upon patients arrival to unit and care assumed.

## 2018-11-14 NOTE — ED TRIAGE NOTES
Pt arrived via [facility] from [facility]. Initial complaint of SOB and N/V. Pt sent from [facility] due to elevated troponin.

## 2018-11-15 LAB
ALBUMIN SERPL-MCNC: 2.8 G/DL (ref 3.5–5)
ALBUMIN/GLOB SERPL: 0.7 {RATIO} (ref 1.1–2.2)
ALP SERPL-CCNC: 112 U/L (ref 45–117)
ALT SERPL-CCNC: 17 U/L (ref 12–78)
ANION GAP SERPL CALC-SCNC: 7 MMOL/L (ref 5–15)
ARTERIAL PATENCY WRIST A: YES
AST SERPL-CCNC: 13 U/L (ref 15–37)
BASE EXCESS BLDA CALC-SCNC: 1.5 MMOL/L
BASOPHILS # BLD: 0 K/UL (ref 0–0.1)
BASOPHILS NFR BLD: 0 % (ref 0–1)
BDY SITE: ABNORMAL
BILIRUB SERPL-MCNC: 0.8 MG/DL (ref 0.2–1)
BREATHS.SPONTANEOUS ON VENT: 16
BUN SERPL-MCNC: 51 MG/DL (ref 6–20)
BUN/CREAT SERPL: 29 (ref 12–20)
CALCIUM SERPL-MCNC: 8.2 MG/DL (ref 8.5–10.1)
CHLORIDE SERPL-SCNC: 95 MMOL/L (ref 97–108)
CHOLEST SERPL-MCNC: 138 MG/DL
CO2 SERPL-SCNC: 29 MMOL/L (ref 21–32)
CREAT SERPL-MCNC: 1.77 MG/DL (ref 0.55–1.02)
DIFFERENTIAL METHOD BLD: ABNORMAL
EOSINOPHIL # BLD: 0 K/UL (ref 0–0.4)
EOSINOPHIL NFR BLD: 0 % (ref 0–7)
EPAP/CPAP/PEEP, PAPEEP: 6
ERYTHROCYTE [DISTWIDTH] IN BLOOD BY AUTOMATED COUNT: 17.4 % (ref 11.5–14.5)
EST. AVERAGE GLUCOSE BLD GHB EST-MCNC: 154 MG/DL
FIO2 ON VENT: 50 %
GAS FLOW.O2 SETTING OXYMISER: 14 L/MIN
GLOBULIN SER CALC-MCNC: 3.9 G/DL (ref 2–4)
GLUCOSE BLD STRIP.AUTO-MCNC: 201 MG/DL (ref 65–100)
GLUCOSE BLD STRIP.AUTO-MCNC: 305 MG/DL (ref 65–100)
GLUCOSE BLD STRIP.AUTO-MCNC: 330 MG/DL (ref 65–100)
GLUCOSE BLD STRIP.AUTO-MCNC: 402 MG/DL (ref 65–100)
GLUCOSE SERPL-MCNC: 271 MG/DL (ref 65–100)
HBA1C MFR BLD: 7 % (ref 4.2–6.3)
HCO3 BLDA-SCNC: 29 MMOL/L (ref 22–26)
HCT VFR BLD AUTO: 39.8 % (ref 35–47)
HDLC SERPL-MCNC: 58 MG/DL
HDLC SERPL: 2.4 {RATIO} (ref 0–5)
HGB BLD-MCNC: 12 G/DL (ref 11.5–16)
IMM GRANULOCYTES # BLD: 0.1 K/UL (ref 0–0.04)
IMM GRANULOCYTES NFR BLD AUTO: 1 % (ref 0–0.5)
IPAP/PIP, IPAPIP: 16
LDLC SERPL CALC-MCNC: 68.6 MG/DL (ref 0–100)
LIPID PROFILE,FLP: NORMAL
LYMPHOCYTES # BLD: 0.8 K/UL (ref 0.8–3.5)
LYMPHOCYTES NFR BLD: 9 % (ref 12–49)
MCH RBC QN AUTO: 24.3 PG (ref 26–34)
MCHC RBC AUTO-ENTMCNC: 30.2 G/DL (ref 30–36.5)
MCV RBC AUTO: 80.7 FL (ref 80–99)
MONOCYTES # BLD: 0.3 K/UL (ref 0–1)
MONOCYTES NFR BLD: 3 % (ref 5–13)
NEUTS SEG # BLD: 7.9 K/UL (ref 1.8–8)
NEUTS SEG NFR BLD: 87 % (ref 32–75)
NRBC # BLD: 0 K/UL (ref 0–0.01)
NRBC BLD-RTO: 0 PER 100 WBC
PCO2 BLDA: 60 MMHG (ref 35–45)
PH BLDA: 7.31 [PH] (ref 7.35–7.45)
PLATELET # BLD AUTO: 262 K/UL (ref 150–400)
PMV BLD AUTO: 11.4 FL (ref 8.9–12.9)
PO2 BLDA: 94 MMHG (ref 80–100)
POTASSIUM SERPL-SCNC: 5.4 MMOL/L (ref 3.5–5.1)
PROT SERPL-MCNC: 6.7 G/DL (ref 6.4–8.2)
RBC # BLD AUTO: 4.93 M/UL (ref 3.8–5.2)
SAO2 % BLD: 96 % (ref 92–97)
SAO2% DEVICE SAO2% SENSOR NAME: ABNORMAL
SERVICE CMNT-IMP: ABNORMAL
SODIUM SERPL-SCNC: 131 MMOL/L (ref 136–145)
SPECIMEN SITE: ABNORMAL
TRIGL SERPL-MCNC: 57 MG/DL (ref ?–150)
TSH SERPL DL<=0.05 MIU/L-ACNC: 0.02 UIU/ML (ref 0.36–3.74)
VLDLC SERPL CALC-MCNC: 11.4 MG/DL
WBC # BLD AUTO: 9.1 K/UL (ref 3.6–11)

## 2018-11-15 PROCEDURE — 74011000258 HC RX REV CODE- 258: Performed by: INTERNAL MEDICINE

## 2018-11-15 PROCEDURE — 80053 COMPREHEN METABOLIC PANEL: CPT

## 2018-11-15 PROCEDURE — 77030029684 HC NEB SM VOL KT MONA -A

## 2018-11-15 PROCEDURE — 85025 COMPLETE CBC W/AUTO DIFF WBC: CPT

## 2018-11-15 PROCEDURE — 36600 WITHDRAWAL OF ARTERIAL BLOOD: CPT

## 2018-11-15 PROCEDURE — 77010033678 HC OXYGEN DAILY

## 2018-11-15 PROCEDURE — 74011250637 HC RX REV CODE- 250/637: Performed by: NURSE PRACTITIONER

## 2018-11-15 PROCEDURE — 94660 CPAP INITIATION&MGMT: CPT

## 2018-11-15 PROCEDURE — 74011250637 HC RX REV CODE- 250/637: Performed by: HOSPITALIST

## 2018-11-15 PROCEDURE — 65660000000 HC RM CCU STEPDOWN

## 2018-11-15 PROCEDURE — 74011636637 HC RX REV CODE- 636/637: Performed by: HOSPITALIST

## 2018-11-15 PROCEDURE — 51798 US URINE CAPACITY MEASURE: CPT

## 2018-11-15 PROCEDURE — 77030011943

## 2018-11-15 PROCEDURE — 74011250636 HC RX REV CODE- 250/636: Performed by: INTERNAL MEDICINE

## 2018-11-15 PROCEDURE — 82962 GLUCOSE BLOOD TEST: CPT

## 2018-11-15 PROCEDURE — 83036 HEMOGLOBIN GLYCOSYLATED A1C: CPT

## 2018-11-15 PROCEDURE — 80061 LIPID PANEL: CPT

## 2018-11-15 PROCEDURE — 74011000250 HC RX REV CODE- 250: Performed by: INTERNAL MEDICINE

## 2018-11-15 PROCEDURE — 82803 BLOOD GASES ANY COMBINATION: CPT

## 2018-11-15 PROCEDURE — 36415 COLL VENOUS BLD VENIPUNCTURE: CPT

## 2018-11-15 PROCEDURE — 94640 AIRWAY INHALATION TREATMENT: CPT

## 2018-11-15 PROCEDURE — 74011250636 HC RX REV CODE- 250/636: Performed by: HOSPITALIST

## 2018-11-15 PROCEDURE — 84443 ASSAY THYROID STIM HORMONE: CPT

## 2018-11-15 PROCEDURE — 77030038269 HC DRN EXT URIN PURWCK BARD -A

## 2018-11-15 RX ORDER — IPRATROPIUM BROMIDE AND ALBUTEROL SULFATE 2.5; .5 MG/3ML; MG/3ML
3 SOLUTION RESPIRATORY (INHALATION)
Status: DISCONTINUED | OUTPATIENT
Start: 2018-11-15 | End: 2018-11-17

## 2018-11-15 RX ORDER — ONDANSETRON 2 MG/ML
1 INJECTION INTRAMUSCULAR; INTRAVENOUS ONCE
Status: DISCONTINUED | OUTPATIENT
Start: 2018-11-15 | End: 2018-11-15

## 2018-11-15 RX ADMIN — INSULIN LISPRO 10 UNITS: 100 INJECTION, SOLUTION INTRAVENOUS; SUBCUTANEOUS at 18:20

## 2018-11-15 RX ADMIN — ONDANSETRON 4 MG: 2 INJECTION INTRAMUSCULAR; INTRAVENOUS at 21:49

## 2018-11-15 RX ADMIN — INSULIN GLARGINE 12 UNITS: 100 INJECTION, SOLUTION SUBCUTANEOUS at 18:18

## 2018-11-15 RX ADMIN — GABAPENTIN 600 MG: 300 CAPSULE ORAL at 21:49

## 2018-11-15 RX ADMIN — INSULIN LISPRO 10 UNITS: 100 INJECTION, SOLUTION INTRAVENOUS; SUBCUTANEOUS at 08:02

## 2018-11-15 RX ADMIN — IPRATROPIUM BROMIDE AND ALBUTEROL SULFATE 3 ML: .5; 3 SOLUTION RESPIRATORY (INHALATION) at 15:03

## 2018-11-15 RX ADMIN — INSULIN LISPRO 7 UNITS: 100 INJECTION, SOLUTION INTRAVENOUS; SUBCUTANEOUS at 23:33

## 2018-11-15 RX ADMIN — IPRATROPIUM BROMIDE AND ALBUTEROL SULFATE 3 ML: .5; 3 SOLUTION RESPIRATORY (INHALATION) at 21:41

## 2018-11-15 RX ADMIN — METHYLPREDNISOLONE SODIUM SUCCINATE 40 MG: 40 INJECTION, POWDER, FOR SOLUTION INTRAMUSCULAR; INTRAVENOUS at 08:02

## 2018-11-15 RX ADMIN — SODIUM CHLORIDE 500 ML: 900 INJECTION, SOLUTION INTRAVENOUS at 21:51

## 2018-11-15 RX ADMIN — ASPIRIN 81 MG: 81 TABLET, COATED ORAL at 08:02

## 2018-11-15 RX ADMIN — IPRATROPIUM BROMIDE AND ALBUTEROL SULFATE 3 ML: .5; 3 SOLUTION RESPIRATORY (INHALATION) at 11:25

## 2018-11-15 RX ADMIN — INSULIN LISPRO 10 UNITS: 100 INJECTION, SOLUTION INTRAVENOUS; SUBCUTANEOUS at 18:14

## 2018-11-15 RX ADMIN — INSULIN LISPRO 4 UNITS: 100 INJECTION, SOLUTION INTRAVENOUS; SUBCUTANEOUS at 12:30

## 2018-11-15 RX ADMIN — CALCIUM GLUCONATE 1 G: 98 INJECTION, SOLUTION INTRAVENOUS at 18:52

## 2018-11-15 RX ADMIN — Medication 10 ML: at 21:50

## 2018-11-15 NOTE — PROGRESS NOTES
[address]  [phone] Cardiology Progress Note 11/15/2018 9:22 AM 
 
Admit Date: 11/14/2018 Admit Diagnosis:  
Acute respiratory failure with hypoxia ([facility] [place] [address] NSTEMI (non-ST elevated myocardial infarction) ([facility] [place] [address] [facility]ation [facility] Subjective:  
 
Sofie Maxwell is a little more reserved this morning, not wanting to talk much. States that she feels a little better than yesterday. Denies chest pain and shortness of breath. Patient understands that she is not having cardiac cath today and asks that we inform her sister who is her POA. Only other complaint was that she wanted to eat and drink this morning. Overnight events:  Respiratory acidosis overnight requiring BIPAP. Now on nasal cannula. Will monitor closely for change in mental status and response. Visit Vitals /82 Pulse (!) 57 Temp 98.7 °F (37.1 °C) Resp 20 Ht 5' 1\" (1.549 m) Wt 223 lb 8.7 oz (101.4 kg) SpO2 98% BMI 42.24 kg/m² Current Facility-Administered Medications Medication Dose Route Frequency  0.9% sodium chloride infusion  50 mL/hr IntraVENous CONTINUOUS  
 sodium chloride (NS) flush 5-10 mL  5-10 mL IntraVENous Q8H  
 sodium chloride (NS) flush 5-10 mL  5-10 mL IntraVENous PRN  
 acetaminophen (TYLENOL) tablet 650 mg  650 mg Oral Q6H PRN  
 ondansetron (ZOFRAN) injection 4 mg  4 mg IntraVENous Q6H PRN  
 docusate sodium (COLACE) capsule 100 mg  100 mg Oral BID  aspirin delayed-release tablet 81 mg  81 mg Oral DAILY  insulin lispro (HUMALOG) injection   SubCUTAneous AC&HS  
 glucose chewable tablet 16 g  4 Tab Oral PRN  
 dextrose (D50W) injection syrg 12.5-25 g  12.5-25 g IntraVENous PRN  
 glucagon (GLUCAGEN) injection 1 mg  1 mg IntraMUSCular PRN  
 albuterol-ipratropium (DUO-NEB) 2.5 MG-0.5 MG/3 ML  3 mL Nebulization Q6H PRN  
 levothyroxine (SYNTHROID) tablet 150 mcg  150 mcg Oral ACB  insulin glargine (LANTUS) injection 12 Units  12 Units SubCUTAneous PCD  gabapentin (NEURONTIN) capsule 600 mg  600 mg Oral QHS  metoprolol tartrate (LOPRESSOR) tablet 12.5 mg  12.5 mg Oral Q12H  
 atorvastatin (LIPITOR) tablet 20 mg  20 mg Oral QHS  methylPREDNISolone (PF) (SOLU-MEDROL) injection 40 mg  40 mg IntraVENous Q12H Objective:  
  
Physical Exam: 
General Appearance:  Obese  female in no acute distress. Alert and oriented x3. Flat affect. Appears older than stated age. Chest:   Clear, diminished in bilateral bases Cardiovascular:  Regular rate and rhythm, no murmur.  
Abdomen:   Soft, non-tender, bowel sounds are active.  
Extremities: Nonpitting (tight) edema to BLE. Erythema noted to BLE. H/o venous stasis. Skin:  Warm and dry.  
 
Data Review:  
Recent Labs 11/15/18 
0451 11/14/18 
9200 WBC 9.1 10.7 HGB 12.0 12.8 HCT 39.8 43.3  218 Recent Labs 11/15/18 
0451 11/14/18 
9347 * 140  
K 5.4* 4.7 CL 95* 98  
CO2 29 31 * 205* BUN 51* 33* CREA 1.77* 1.49* CA 8.2* 9.0 MG  --  1.7 ALB 2.8* 3.2* TBILI 0.8 0.7 SGOT 13* 28 ALT 17 20 INR  --  1.0 Recent Labs 11/14/18 
1376 11/14/18 
0530 11/14/18 
3498 TROIQ 1.61* 2.28* 2.59*   --   --   
CKMB 6.4*  --   -- Intake/Output Summary (Last 24 hours) at 11/15/2018 8368 Last data filed at 11/15/2018 2219 Gross per 24 hour Intake  Output 675 ml Net -675 ml Telemetry: Sinus rhythm ECHO:  
Left ventricle: Systolic function was normal. Ejection fraction was 
estimated in the range of 55 % to 60 %. No obvious wall motion 
abnormalities identified in the views obtained. Right ventricle: The ventricle was mildly dilated. Left atrium: The atrium was mildly dilated. Right atrium: The atrium was mildly dilated. Inferior vena cava, hepatic veins: The inferior vena cava was dilated. Assessment:  
 
Active Problems: COPD exacerbation ([facility] [address] (11/14/2018) NSTEMI (non-ST elevated myocardial infarction) ([facility] [address] (11/14/2018) Acute respiratory failure with hypoxia ([facility] [address] (11/14/2018) Plan:  
 
NSTEMI: 
Cardiac cath cancelled for today d/t increase in creatinine. Will hydrate patient and recheck labs in am.  Plan for possible cardiac cath tomorrow if creatinine improves. Will discuss plan with patient's sister- POA. ECHO shows EF 55-60% with mildly dilated RA, LA, RV and IVC. Continue ASA, statin, and BB. Continue IV fluids for hydration as Cr is 1.77 this AM. Continue to treat and monitor blood glucose. Blood glucose this am 305. Meera Sultana [facility] Cardiology [address] [facility] Cardiology 11/15/2018 Patient seen, examined by me personally. Plan discussed as detailed. Agree with note as outlined by  NP. I confirm findings in history and physical exam. No additional findings noted. Agree with plan as outlined above. Renal function worse today despite hydration. She is pain free. Will hold off on cath/PCI today. Tentatively  rescheduled for tomorrow AM. Will discuss further management options with sister.  
 
Prince Rebecca MD

## 2018-11-15 NOTE — PROGRESS NOTES
PCU SHIFT NURSING NOTE Bedside and Verbal shift change report given to Miles Haley and Leopoldo Winn  (oncoming nurse) by Leticia Figueredo  (offgoing nurse). Report included the following information SBAR, Kardex, [facility] and Procedure Verification. Shift Summary:  
0800 meds were held d/t upcoming procedure 1000 Procedure was canceled d/t lab results, put off until 11/16/2018 Pt will be NPO after midnight Pt refused all PO medications except insulin 1 Sister in the room consent was obtained Admission Date 11/14/2018 Admission Diagnosis Acute respiratory failure with hypoxia (HCC) NSTEMI (non-ST elevated myocardial infarction) ([facility] [address] COPD exacerbation ([facility] [address] Consults IP CONSULT TO CARDIOLOGY 
IP CONSULT TO PULMONOLOGY Consults [x]PT [x]OT []Speech  
[]Case Management  
  
[] Palliative Cardiac Monitoring Order  
[x]Yes []No  
 
IV drips []Yes Drip:                            Dose: 
Drip:                            Dose: 
Drip:                            Dose:  
[]No  
 
GI Prophylaxis []Yes []No  
 
 
 
DVT Prophylaxis SCDs:     
     
 Charles stockings:     
  
[] Medication []Contraindicated []None Activity Level Activity Level: Bed Rest   
 Activity Assistance: Partial (two people) Purposeful Rounding every 1-2 hour? [x]Yes Mckoy Score  Total Score: 4 Bed Alarm (If score 3 or >) [x]Yes  
[] Refused (See signed refusal form in chart) Cyrus Score  Cyrus Score: 15 Cyrus Score (if score 14 or less) []PMT consult  
[]Wound Care consult []Specialty bed  
[] Nutrition consult Needs prior to discharge:  
Home O2 required:   
[]Yes []No  
 If yes, how much O2 required? Other:  
 Last Bowel Movement: Last Bowel Movement Date: 11/14/18 Influenza Vaccine Pneumonia Vaccine Diet Active Orders Diet DIET CARDIAC Regular; 2-3 GM NA  
 DIET NPO Past Midnight LDAs Peripheral IV 11/15/18 Right Wrist (Active) Site Assessment Clean, dry, & intact 11/15/2018  7:12 AM  
Phlebitis Assessment 0 11/15/2018  7:12 AM  
Infiltration Assessment 0 11/15/2018  7:12 AM  
Dressing Status Clean, dry, & intact; New 11/15/2018  7:12 AM  
Dressing Type Transparent 11/15/2018  7:12 AM  
Hub Color/Line Status Blue;Flushed;Capped 11/15/2018  7:12 AM  
      
External Female Catheter 11/14/18 (Active) Site Assessment Clean, dry, & intact 11/14/2018  8:08 PM  
Repositioned Yes 11/14/2018  8:08 PM  
Perineal Care No 11/14/2018  8:08 PM  
Wick Changed No 11/14/2018  8:08 PM  
Suction Canister/Tubing Changed No 11/14/2018  8:08 PM  
            
Urinary Catheter Intake & Output Date 11/14/18 0700 - 11/15/18 0659 11/15/18 0700 - 11/16/18 7082 Shift 3105-6386 6023-8531 24 Hour Total 4643-2719 7842-1263 24 Hour Total  
INTAKE Shift Total(mL/kg) OUTPUT Urine(mL/kg/hr)  675(0.6) 675(0.3) Urine  675 675 Shift Total(mL/kg)  675(6.7) 675(6.7) NET  -290 -438 Weight (kg) 102.1 101.4 101.4 101.4 101.4 101.4 Readmission Risk Assessment Tool Score Medium Risk 25 Total Score 3 Has Seen PCP in Last 6 Months (Yes=3, No=0)  
 4 IP Visits Last 12 Months (1-3=4, 4=9, >4=11) 5 Pt. Coverage (Medicare=5 , Medicaid, or Self-Pay=4) 6 Charlson Comorbidity Score (Age + Comorbid Conditions) Criteria that do not apply:  
 . Living with Significant Other. Assisted Living. LTAC. SNF. or  
Rehab Patient Length of Stay (>5 days = 3) Expected Length of Stay 4d 4h Actual Length of Stay 1

## 2018-11-15 NOTE — DIABETES MGMT
DTC Progress Note Recommendations/ Comments: Please consider the followin) beginning humalog 5 units ac tid-- hold while pt NPO 
2) adding DM restrictions to diet when pt eating 3) beginning NPH 10 units Q12hrs linked and timed with solu-medrol Current hospital DM medication: lantus 12 units pc dinner and humalog correction resistant scale Chart reviewed on Jayne Lizama. Patient is a 59 y.o. female with known Type 2 Diabetes on novolog 5 units ac tid plus correction scale and lantus 12 units daily at home. A1c:  
Lab Results Component Value Date/Time Hemoglobin A1c 7.0 (H) 11/15/2018 04:51 AM  
 Hemoglobin A1c 8.0 (H) 2018 01:36 AM  
 
 
Recent Glucose Results:  
Lab Results Component Value Date/Time  (H) 11/15/2018 04:51 AM  
 GLUCPOC 201 (H) 11/15/2018 11:44 AM  
 GLUCPOC 305 (H) 11/15/2018 07:22 AM  
 GLUCPOC 249 (H) 2018 09:01 PM  
  
 
Lab Results Component Value Date/Time Creatinine 1.77 (H) 11/15/2018 04:51 AM  
 
Estimated Creatinine Clearance: 37.4 mL/min (A) (based on SCr of 1.77 mg/dL (H)). Active Orders Diet DIET CARDIAC Regular; 2-3 GM NA  
 DIET NPO Past Midnight PO intake: No data found. Will continue to follow as needed. Thank you Tin Carey, BSN, RN, CDE [facility]

## 2018-11-15 NOTE — CDMP QUERY
There is noted documentation of \"Acute Encephalopathy\" on this record. Could this be further clarified as 
 
=> Acute Metabolic Encephalopathy in the setting of NSTEMI and ANN POA requiring Increased Safety Precautions 
=> Acute Metabolic Encephalopathy in the setting of Hypoxia/Hypercapnea POA requiring Increased Safety Precautions 
=>Other Explanation of clinical findings =>Clinically Undetermined (no explanation for clinical findings) The medical record reflects the following clinical findings, treatment, and risk factors: 
 
Risk Factors: NSTEMI; ANN; Acute Respiratory Failure Clinical Indicators: lethargic; confusion; oriented x self Treatment: Increased Safety Precautions: High-Fall Risk; Fall prevention device; Bed/Chair alarm; Side rails x3; Door open when pt unattended; Room close to nurses station Please clarify and document your clinical opinion in the progress notes and discharge summary including the definitive and/or presumptive diagnosis, (suspected or probable), related to the above clinical findings. Please include clinical findings supporting your diagnosis Thank Addis Delgado [facility] 
[phone]

## 2018-11-15 NOTE — PROGRESS NOTES
Sister Ms. Wood Backers updated. She has agreed to proceed with cath/PCI as necessary. Will await renal function in AM.

## 2018-11-15 NOTE — PROGRESS NOTES
PCU SHIFT NURSING NOTE Bedside and Verbal shift change report given to Vitor Key, RN (oncoming nurse) by Wenceslao Campoverde RN (offgoing nurse). Report included the following information SBAR, Kardex, MAR and Recent Results. Shift Summary:  
2008: Upon assessing patient I noticed that she had some skin breakdown in her pannus area on the left side as well as her R groin. 2015: Spoke with patients sister Marbella Bang to update her on patient condition. Lisa informed me that she is the patients POA, this is not documented in the chart. Lisa informed me that she makes the patient's healthcare decisions because the patient is mentally incapacitated yet high functioning but she is unable to understand medical terminology, risk, and benefits. I informed Dante Peters that we did not have any documentation in the chart stating that she was the POA so we need her to bring those documents to the hospital when she visits, although the admitting physicians note does say that she is the surrogate decision maker for the patient. Patient verbally stated that sister Marbella Bang makes her medical decisions. 0715: Bedside and Verbal shift change report given to Humberto Martinez RN (oncoming nurse) by Vitor Key RN (offgoing nurse). Report included the following information [facility] and Recent Results 
5595: Paged Dr. Rob Mchugh 
8473: Spoke with Dr. Rob Mchugh about patient's sister Marbella Bang needing to be contacted about the cardiac cath that's supposed to be done this morning so that we can obtain consent. He said that Leonidas Bautista will be doing rounds and for us to let her know and that he will let her know as well. Admission Date 11/14/2018 Admission Diagnosis Acute respiratory failure with hypoxia (HCC) NSTEMI (non-ST elevated myocardial infarction) ([facility] [address] COPD exacerbation ([facility] [address] Consults IP CONSULT TO CARDIOLOGY 
IP CONSULT TO PULMONOLOGY Consults []PT []OT []Speech  
[]Case Management  
  
[] Palliative Cardiac Monitoring Order []Yes []No  
 
IV drips []Yes Drip:                            Dose: 
Drip:                            Dose: 
Drip:                            Dose:  
[]No  
 
GI Prophylaxis []Yes []No  
 
 
 
DVT Prophylaxis SCDs:     
     
 Charles stockings:     
  
[] Medication []Contraindicated []None Activity Level Activity Level: Bed Rest   
 Activity Assistance: Partial (two people) Purposeful Rounding every 1-2 hour? []Yes Mckoy Score  Total Score: 4 Bed Alarm (If score 3 or >) []Yes  
[] Refused (See signed refusal form in chart) Cyrus Score  Cyrus Score: 16 Cyrus Score (if score 14 or less) []PMT consult  
[]Wound Care consult []Specialty bed  
[] Nutrition consult Needs prior to discharge:  
Home O2 required:   
[]Yes []No  
 If yes, how much O2 required? Other:  
 Last Bowel Movement:    
  
Influenza Vaccine Pneumonia Vaccine Diet Active Orders Diet DIET NPO Past Midnight LDAs Peripheral IV 11/14/18 Right Antecubital (Active) Site Assessment Clean, dry, & intact 11/14/2018  8:08 PM  
Phlebitis Assessment 0 11/14/2018  8:08 PM  
Infiltration Assessment 0 11/14/2018  8:08 PM  
Dressing Status Clean, dry, & intact 11/14/2018  8:08 PM  
Dressing Type Tape;Transparent 11/14/2018  8:08 PM  
Hub Color/Line Status Green 11/14/2018  8:08 PM  
      
External Female Catheter 11/14/18 (Active) Site Assessment Clean, dry, & intact 11/14/2018  8:08 PM  
Repositioned Yes 11/14/2018  8:08 PM  
Perineal Care No 11/14/2018  8:08 PM  
Wick Changed No 11/14/2018  8:08 PM  
Suction Canister/Tubing Changed No 11/14/2018  8:08 PM  
            
Urinary Catheter Intake & Output Readmission Risk Assessment Tool Score High Risk   
      
 22 Total Score 3 Has Seen PCP in Last 6 Months (Yes=3, No=0)  
 4 IP Visits Last 12 Months (1-3=4, 4=9, >4=11) 9 Pt. Coverage (Medicare=5 , Medicaid, or Self-Pay=4) 6 Charlson Comorbidity Score (Age + Comorbid Conditions) Criteria that do not apply:  
 . Living with Significant Other. Assisted Living. LTAC. SNF. or  
Rehab Patient Length of Stay (>5 days = 3) Expected Length of Stay - - - Actual Length of Stay 1

## 2018-11-15 NOTE — CONSULTS
[facility]  Pulmonary, Critical Care, and Sleep Medicine    Initial Patient Consult    Name: Sarah Parisi MRN: [id number]   : 1960 Hospital: [facility]   Date: 11/15/2018        IMPRESSION:   · Chronic hypercapnic respiratory failure (can not exclude an acute component)  · Appears to have an acute metabolic acidosis superimposed on chronically elevated serum bicarbonate  · NSTEMI  · Nausea/vomiting  · COPD - no details available, not on treatment at home, not clearly exacerbated, but may already be better due to treatment  · Acute/chronic renal failure  · DM  · Tobacco use      RECOMMENDATIONS:   · O2 - wean  · Bronchodilators  · Steroid taper  · Cardiology evaluation ongoing  · Would consider nephrology evaluation with rising creatinine  · Insulin  · DVT prophylaxis     Subjective: This patient has been seen and evaluated at the request of Dr. Robel Vera for \"on BiPAP\". Patient is a 59 y.o. female with a history of COPD (no details available), who presented with nausea and vomiting and a positive troponin. Reportedly had respiratory distress as well. She is a limited historian, but she denies having any shortness of breath or chest pain. She was started on BiPAP which she tolerated poorly. She is off of it now and denies any dyspnea. She reports she is on no inhalers for her COPD. Actively smokes. Past Medical History:   Diagnosis Date    Arthritis     Asthma     Bronchitis     GERD (gastroesophageal reflux disease)     Hypercholesterolemia     Hypertension     Hypothyroid     IDDM (insulin dependent diabetes mellitus) (HCC)     Morbid obesity (HCC)     Peripheral neuropathy     Thyroid disease     Venous insufficiency       Past Surgical History:   Procedure Laterality Date    HX AMPUTATION Bilateral     toes    HX COLONOSCOPY        Prior to Admission medications    Medication Sig Start Date End Date Taking?  Authorizing Provider potassium chloride (K-DUR, KLOR-CON) 20 mEq tablet Take 20 mEq by mouth every other day. Alternates day with torsemide   Yes Provider, Historical   torsemide (DEMADEX) 20 mg tablet Take 20 mg by mouth every other day. Alternates administration w/ potassium supplement   Yes Provider, Historical   silver sulfADIAZINE (SILVADENE) 1 % topical cream Apply 1 Each to affected area every Monday, Wednesday, Friday. Apply to legs   Yes Provider, Historical   zinc 50 mg tab tablet Take 50 mg by mouth daily. 3/23/18  Yes Provider, Historical   insulin aspart U-100 (NOVOLOG FLEXPEN U-100 INSULIN) 100 unit/mL inpn 5 Units by SubCUTAneous route Before breakfast, lunch, and dinner. + SSI TIDAC:  140-199=2 u            200-249=3 u  250-299=5 u  300-349=8 u  Patient taking differently: 5 Units by SubCUTAneous route Before breakfast, lunch, and dinner. Sliding Scale   under none  150-200 5 units  201-250 10 units  251-300 15 units  301-350 20 units  351-400 25 units  over 400 call MD 3/20/18  Yes Deven Mejia MD   levothyroxine (SYNTHROID) 150 mcg tablet Take 150 mcg by mouth Daily (before breakfast). Yes Provider, Historical   gabapentin (NEURONTIN) 300 mg capsule Take 600 mg by mouth nightly. Yes Provider, Historical   insulin glargine (LANTUS) 100 unit/mL injection 12 Units by SubCUTAneous route daily (after dinner). Patient takes at 1830 each day   Yes Provider, Historical   ipratropium-albuterol (COMBIVENT RESPIMAT)  mcg/actuation inhaler Take 1 Puff by inhalation every six (6) hours as needed for Shortness of Breath. Yes Provider, Historical   aspirin delayed-release 81 mg tablet Take 81 mg by mouth daily.    Yes Provider, Historical     No Known Allergies   Social History     Tobacco Use    Smoking status: Current Every Day Smoker     Packs/day: 0.50     Types: Cigarettes     Last attempt to quit: 2012     Years since quittin.9    Smokeless tobacco: Never Used   Substance Use Topics    Alcohol use: Yes     Comment: social      Family History   Problem Relation Age of Onset    COPD Father     Alcohol abuse Father         Current Facility-Administered Medications   Medication Dose Route Frequency    0.9% sodium chloride infusion  50 mL/hr IntraVENous CONTINUOUS    sodium chloride (NS) flush 5-10 mL  5-10 mL IntraVENous Q8H    docusate sodium (COLACE) capsule 100 mg  100 mg Oral BID    aspirin delayed-release tablet 81 mg  81 mg Oral DAILY    insulin lispro (HUMALOG) injection   SubCUTAneous AC&HS    levothyroxine (SYNTHROID) tablet 150 mcg  150 mcg Oral ACB    insulin glargine (LANTUS) injection 12 Units  12 Units SubCUTAneous PCD    gabapentin (NEURONTIN) capsule 600 mg  600 mg Oral QHS    metoprolol tartrate (LOPRESSOR) tablet 12.5 mg  12.5 mg Oral Q12H    atorvastatin (LIPITOR) tablet 20 mg  20 mg Oral QHS    methylPREDNISolone (PF) (SOLU-MEDROL) injection 40 mg  40 mg IntraVENous Q12H       Review of Systems:  A comprehensive review of systems was negative except for that written in the HPI. Objective:   Vital Signs:    Visit Vitals  /82   Pulse (!) 57   Temp 98.7 °F (37.1 °C)   Resp 20   Ht 5' 1\" (1.549 m)   Wt 101.4 kg (223 lb 8.7 oz)   SpO2 98%   BMI 42.24 kg/m²       O2 Device: BIPAP   O2 Flow Rate (L/min): 2 l/min   Temp (24hrs), Av.4 °F (36.9 °C), Min:98.1 °F (36.7 °C), Max:98.7 °F (37.1 °C)       Intake/Output:   Last shift:      No intake/output data recorded. Last 3 shifts:  1901 - 11/15 0700  In: -   Out: 675 [Urine:675]    Intake/Output Summary (Last 24 hours) at 11/15/2018 0946  Last data filed at 11/15/2018 0542  Gross per 24 hour   Intake    Output 675 ml   Net -675 ml      Physical Exam:   General:  Alert, cooperative, no distress    Head:  Normocephalic, without obvious abnormality, atraumatic. Eyes:  Conjunctivae/corneas clear. Nose: Nares normal. Septum midline.  Mucosa normal.     Throat: Lips, mucosa, and tongue normal.     Neck: Supple, symmetrical, trachea midline    Back:   Symmetric, no curvature. ROM normal.   Lungs:   Clear to auscultation bilaterally. Chest wall:  No tenderness or deformity. Heart:  Regular rate and rhythm    Abdomen:   Soft, non-tender.  Bowel sounds normal.     Extremities: Extremities normal, atraumatic, no cyanosis, 3+ chronic lower extremity edema   Skin: Skin color, texture, turgor normal. No rashes or lesions   Lymph nodes: Cervical, supraclavicular nodes normal.   Neurologic: Grossly nonfocal     Data review:     Recent Results (from the past 24 hour(s))   GLUCOSE, POC    Collection Time: 11/14/18  9:58 AM   Result Value Ref Range    Glucose (POC) 252 (H) 65 - 100 mg/dL    Performed by Den Dee \"Ayush\"    GLUCOSE, POC    Collection Time: 11/14/18  1:16 PM   Result Value Ref Range    Glucose (POC) 269 (H) 65 - 100 mg/dL    Performed by [address] [place] [id number], ARTERIAL    Collection Time: 11/14/18  5:00 PM   Result Value Ref Range    pH 7.21 (LL) 7.35 - 7.45      PCO2 59 (H) 35.0 - 45.0 mmHg    PO2 59 (L) 80 - 100 mmHg    O2 SAT 84 (L) 92 - 97 %    BICARBONATE 23 22 - 26 mmol/L    BASE DEFICIT 5.6 mmol/L    O2 METHOD NASAL O2      O2 FLOW RATE 3.00 L/min    Sample source ARTERIAL      SITE RIGHT RADIAL      DEEPAK'S TEST YES      Critical value read back KAMLA GARCIA RN    GLUCOSE, POC    Collection Time: 11/14/18  5:18 PM   Result Value Ref Range    Glucose (POC) 291 (H) 65 - 100 mg/dL    Performed by Juan Swain, POC    Collection Time: 11/14/18  9:01 PM   Result Value Ref Range    Glucose (POC) 249 (H) 65 - 100 mg/dL    Performed by Shona Rosales (PCT)    BLOOD GAS, ARTERIAL    Collection Time: 11/15/18  4:00 AM   Result Value Ref Range    pH 7.31 (L) 7.35 - 7.45      PCO2 60 (H) 35.0 - 45.0 mmHg    PO2 94 80 - 100 mmHg    O2 SAT 96 92 - 97 %    BICARBONATE 29 (H) 22 - 26 mmol/L    BASE EXCESS 1.5 mmol/L    O2 METHOD BIPAP      FIO2 50 %    SET RATE 14      SPONTANEOUS RATE 16.0 IPAP/PIP 16.0      EPAP/CPAP/PEEP 6.0      Sample source ARTERIAL      SITE RIGHT RADIAL      DEEPAK'S TEST YES     METABOLIC PANEL, COMPREHENSIVE    Collection Time: 11/15/18  4:51 AM   Result Value Ref Range    Sodium 131 (L) 136 - 145 mmol/L    Potassium 5.4 (H) 3.5 - 5.1 mmol/L    Chloride 95 (L) 97 - 108 mmol/L    CO2 29 21 - 32 mmol/L    Anion gap 7 5 - 15 mmol/L    Glucose 271 (H) 65 - 100 mg/dL    BUN 51 (H) 6 - 20 MG/DL    Creatinine 1.77 (H) 0.55 - 1.02 MG/DL    BUN/Creatinine ratio 29 (H) 12 - 20      GFR est AA 36 (L) >60 ml/min/1.73m2    GFR est non-AA 29 (L) >60 ml/min/1.73m2    Calcium 8.2 (L) 8.5 - 10.1 MG/DL    Bilirubin, total 0.8 0.2 - 1.0 MG/DL    ALT (SGPT) 17 12 - 78 U/L    AST (SGOT) 13 (L) 15 - 37 U/L    Alk. phosphatase 112 45 - 117 U/L    Protein, total 6.7 6.4 - 8.2 g/dL    Albumin 2.8 (L) 3.5 - 5.0 g/dL    Globulin 3.9 2.0 - 4.0 g/dL    A-G Ratio 0.7 (L) 1.1 - 2.2     CBC WITH AUTOMATED DIFF    Collection Time: 11/15/18  4:51 AM   Result Value Ref Range    WBC 9.1 3.6 - 11.0 K/uL    RBC 4.93 3.80 - 5.20 M/uL    HGB 12.0 11.5 - 16.0 g/dL    HCT 39.8 35.0 - 47.0 %    MCV 80.7 80.0 - 99.0 FL    MCH 24.3 (L) 26.0 - 34.0 PG    MCHC 30.2 30.0 - 36.5 g/dL    RDW 17.4 (H) 11.5 - 14.5 %    PLATELET 642 596 - 646 K/uL    MPV 11.4 8.9 - 12.9 FL    NRBC 0.0 0  WBC    ABSOLUTE NRBC 0.00 0.00 - 0.01 K/uL    NEUTROPHILS 87 (H) 32 - 75 %    LYMPHOCYTES 9 (L) 12 - 49 %    MONOCYTES 3 (L) 5 - 13 %    EOSINOPHILS 0 0 - 7 %    BASOPHILS 0 0 - 1 %    IMMATURE GRANULOCYTES 1 (H) 0.0 - 0.5 %    ABS. NEUTROPHILS 7.9 1.8 - 8.0 K/UL    ABS. LYMPHOCYTES 0.8 0.8 - 3.5 K/UL    ABS. MONOCYTES 0.3 0.0 - 1.0 K/UL    ABS. EOSINOPHILS 0.0 0.0 - 0.4 K/UL    ABS. BASOPHILS 0.0 0.0 - 0.1 K/UL    ABS. IMM.  GRANS. 0.1 (H) 0.00 - 0.04 K/UL    DF AUTOMATED     LIPID PANEL    Collection Time: 11/15/18  4:51 AM   Result Value Ref Range    LIPID PROFILE          Cholesterol, total 138 <200 MG/DL    Triglyceride 57 <150 MG/DL HDL Cholesterol 58 MG/DL    LDL, calculated 68.6 0 - 100 MG/DL    VLDL, calculated 11.4 MG/DL    CHOL/HDL Ratio 2.4 0 - 5.0     TSH 3RD GENERATION    Collection Time: 11/15/18  4:51 AM   Result Value Ref Range    TSH 0.02 (L) 0.36 - 3.74 uIU/mL   GLUCOSE, POC    Collection Time: 11/15/18  7:22 AM   Result Value Ref Range    Glucose (POC) 305 (H) 65 - 100 mg/dL    Performed by Mónica Mason (PCT)        Imaging:  I have personally reviewed the patients radiographs and have reviewed the reports:  CT chest without signs of active disease        Manual MD Carol

## 2018-11-15 NOTE — PROGRESS NOTES
PCU SHIFT NURSING NOTE Bedside shift change report given to [facility] (oncoming nurse) by [facility] (offgoing nurse). Report included the following information SBAR, Kardex, Intake/Output and MAR. Shift Summary:  
0730 - Admission Date 11/14/2018 Admission Diagnosis Acute respiratory failure with hypoxia (HCC) NSTEMI (non-ST elevated myocardial infarction) ([facility] [address] COPD exacerbation ([facility] [address] Consults IP CONSULT TO CARDIOLOGY 
IP CONSULT TO PULMONOLOGY Consults []PT []OT []Speech  
[]Case Management  
  
[] Palliative Cardiac Monitoring Order []Yes []No  
 
IV drips []Yes Drip:                            Dose: 
Drip:                            Dose: 
Drip:                            Dose:  
[]No  
 
GI Prophylaxis []Yes []No  
 
 
 
DVT Prophylaxis SCDs:     
     
 Charles stockings:     
  
[] Medication []Contraindicated []None Activity Level Activity Level: Bed Rest   
 Activity Assistance: Partial (two people) Purposeful Rounding every 1-2 hour? []Yes Mckoy Score  Total Score: 4 Bed Alarm (If score 3 or >) []Yes  
[] Refused (See signed refusal form in chart) Cyrus Score  Cyrus Score: 16 Cyrus Score (if score 14 or less) []PMT consult  
[]Wound Care consult []Specialty bed  
[] Nutrition consult Needs prior to discharge:  
Home O2 required:   
[]Yes []No  
 If yes, how much O2 required? Other:  
 Last Bowel Movement:    
  
Influenza Vaccine Pneumonia Vaccine Diet Active Orders Diet DIET NPO Past Midnight LDAs Peripheral IV 11/15/18 Right Wrist (Active) Site Assessment Clean, dry, & intact 11/15/2018  7:12 AM  
Phlebitis Assessment 0 11/15/2018  7:12 AM  
Infiltration Assessment 0 11/15/2018  7:12 AM  
Dressing Status Clean, dry, & intact; New 11/15/2018  7:12 AM  
Dressing Type Transparent 11/15/2018  7:12 AM  
Hub Color/Line Status Blue;Flushed;Capped 11/15/2018  7:12 AM  
      
 External Female Catheter 11/14/18 (Active) Site Assessment Clean, dry, & intact 11/14/2018  8:08 PM  
Repositioned Yes 11/14/2018  8:08 PM  
Perineal Care No 11/14/2018  8:08 PM  
Wick Changed No 11/14/2018  8:08 PM  
Suction Canister/Tubing Changed No 11/14/2018  8:08 PM  
            
Urinary Catheter Intake & Output Date 11/14/18 0700 - 11/15/18 0659 11/15/18 0700 - 11/16/18 3353 Shift 6013-0856 5684-2610 24 Hour Total 1406-9912 6570-5535 24 Hour Total  
INTAKE Shift Total(mL/kg) OUTPUT Urine(mL/kg/hr)  675(0.6) 675(0.3) Urine  675 675 Shift Total(mL/kg)  675(6.7) 675(6.7) NET  -546 -258 Weight (kg) 102.1 101.4 101.4 101.4 101.4 101.4 Readmission Risk Assessment Tool Score High Risk   
      
 22 Total Score 3 Has Seen PCP in Last 6 Months (Yes=3, No=0)  
 4 IP Visits Last 12 Months (1-3=4, 4=9, >4=11) 9 Pt. Coverage (Medicare=5 , Medicaid, or Self-Pay=4) 6 Charlson Comorbidity Score (Age + Comorbid Conditions) Criteria that do not apply:  
 . Living with Significant Other. Assisted Living. LTAC. SNF. or  
Rehab Patient Length of Stay (>5 days = 3) Expected Length of Stay - - - Actual Length of Stay 1

## 2018-11-15 NOTE — PROGRESS NOTES
Hospitalist Progress Note NAME: Nelda Rey :  1960 MRN:  [id number] This patient is at above high risk of deterioration based on documented presenting clinical data, comorbid conditions, high risk of adverse events and current acute care course. Assessment / Plan: 
Acute renal failure Hyponatremia Hyperkalemia 
-bmp at 2000 following fluid bolus and repeat in AM - goal is to trend down for possible cath in AM 
-no ecg changes c/w hyperK 
-correct bs 
-adjust IVF 
-give mag 
-calcium x1 Acute hypoxic respiratory failure, POA, suspect multifactorial to include NSTEMI and acute on chronic copd exacerbation possibly early bronchitis NSTEMI Tobacco abuse 
-will diurese x 1 
-CT chest neg 
-remain on nebs 
-acapella 
-once med rec completes would continue OP PO meds 
-empiric levaquin - will dose adjust to PO 
-in setting of NSTEMI have to consider it to be the prime consideration for etiology of COPD flare 
-remain on lovenox, asa 
-FLP pending 
-cards insight appreciated 
-bb started -TTE pending 
-no CP but had SOB and n/v earlier. 
-cardiac cath when renal function stable. 
-counseled cessation 
  
Epigastric pain, hx pancreatitis 3/18 LLQ pain N/V earlier, resolved 
-CT abd neg 
-no further n/v - n/v could have been signs of ACS? -encourage PO if cardiology with no intervention 
  
Acute encephalopathy, POA Acidosis, mixed Lethargic 
-abg improved - slightly hypercapnic but pH improved 
-encourage up and moving if able 
-confusion resolved 
  
IDDM 2 uncontrolled with renal insufficiency 
-suspect elevated bs playing  A role in hyponatremia/hyperkalemia 
-correct bs 
-remain on lantus for now - if npo watch bs 
-ssi continues 
  
Hypothyroid 
-continue levothyroxine 
  
Morbid obesity B/l venous stasis Body mass index is 42.51 kg/m².  
-encourage up oob and moving when able and confusion abates 
  
Code:  Presumed full code since patient not reliable, not clear she has capacity at this time with her lethargy. Says she would not want intubation but was full code 3/18. No advance directive on file. DVT prophylaxis: lovenox Surrogate decision maker:  Sister Angie Vilchis Medical Decision Making Today · Acute or chronic illness that poses a threat to life or bodily function · I have reviewed the flowsheet and previous days notes · One or more chronic illnesses with severe exacerbation, progression or side effects of treatment · Review and order of Clinical lab tests · Discuss case with Specialist MD 
 
Subjective: Chief Complaint / Reason for Physician Visit \"i feel good\". Discussed with RN events overnight. Sitting up in bed interactive, following commands. No pains. Less sob. +cough. No n/v 
 
Review of Systems: 
Symptom Y/N Comments  Symptom Y/N Comments Fever/Chills n   Chest Pain n   
Poor Appetite y   Edema Cough y   Abdominal Pain n   
Sputum n   Joint Pain SOB/RAMIREZ y   Pruritis/Rash Nausea/vomit    Tolerating PT/OT Diarrhea n   Tolerating Diet y Constipation n   Other Could NOT obtain due to:   
 
Objective: VITALS:  
Last 24hrs VS reviewed since prior progress note. Most recent are: 
Patient Vitals for the past 24 hrs: 
 Temp Pulse Resp BP SpO2  
11/15/18 0753     98 % 11/15/18 0719  (!) 57  143/82 95 % 11/15/18 0423 98.7 °F (37.1 °C) (!) 59 20 147/86 97 % 11/14/18 2313 98.1 °F (36.7 °C) 74 20 145/79 96 % 11/14/18 2143  70     
11/14/18 2008 98.4 °F (36.9 °C) 74 20 140/64 96 % 11/14/18 1850 98.5 °F (36.9 °C) 83 18 124/71 94 % 11/14/18 1800  73 19 126/57 93 % 11/14/18 1745  76 21 132/59 93 % 11/14/18 1630  71 17 119/52 95 % 11/14/18 1615  77 18 122/53 93 % 11/14/18 1600  79 19 129/57 92 % 11/14/18 1500  82 22 119/55 92 % 11/14/18 1445  81 17 124/52 94 % 11/14/18 1400  84 19 128/58 95 % 11/14/18 1345  87 18 133/62 95 % 11/14/18 1317  86 18 138/60 94 % 11/14/18 1015  82 16 152/73 91 % 11/14/18 1000  85 20 149/69 94 % 11/14/18 0945  88 26 154/77 94 % 11/14/18 0915  87 19 153/54 92 % Intake/Output Summary (Last 24 hours) at 11/15/2018 0840 Last data filed at 11/15/2018 2025 Gross per 24 hour Intake  Output 675 ml Net -675 ml PHYSICAL EXAM: 
General: WD, obese f sitting up in bed interactive, Alert, cooperative, no acute distress   
EENT:  EOMI. Anicteric sclerae. MM dry Resp:  Mild basilar crackles, no wheezing, No accessory muscle use CV:  Regular  rhythm,  No edema GI:  Soft, Non distended, Non tender.  +Bowel sounds Neurologic:  Alert and oriented X 3, normal speech Psych:   Fair  insight. Not anxious nor agitated Skin:  no rashes or ulcers. No jaundice Reviewed most current lab test results and cultures  YES Reviewed most current radiology test results   YES Review and summation of old records today    NO Reviewed patient's current orders and MAR    YES 
PMH/ reviewed - no change compared to H&P 
________________________________________________________________________ Care Plan discussed with: 
  Comments Patient x Discussed with patient in room. POC discussed. Questions answered (25 Family  x Sister Mary Grief, in room RN Care Manager Consultant: x Cardiology 5 Multidiciplinary team rounds were held today with , nursing, pharmacist and clinical coordinator. Patient's plan of care was discussed; medications were reviewed and discharge planning was addressed. ________________________________________________________________________ Total NON critical care TIME:  40   Minutes Total CRITICAL CARE TIME Spent:   Minutes non procedure based. I have provided critical care time. During this entire length of time I was immediately available to the patient.  The reason for providing this level of medical care was due to a critical illness that impaired one or more vital organ systems, such that there was a high probability of imminent or life threatening deterioration in the patient's condition. This care involved high complexity decision making which includes reviewing the patient's past medical records, current laboratory results, and actual Xray films in order to assess, support vital system function, and to treat this degree of vital organ system failure, and to prevent further life threatening deterioration of the patients condition. Comments >50% of visit spent in counseling and coordination of care x See above  
________________________________________________________________________ Procedures: see electronic medical records for all procedures/Xrays and details which were not copied into this note but were reviewed prior to creation of Plan. LABS: 
Recent Labs 11/15/18 
0451 11/14/18 
2722 WBC 9.1 10.7 HGB 12.0 12.8 HCT 39.8 43.3  218 Recent Labs 11/15/18 
0451 11/14/18 
4514 * 140  
K 5.4* 4.7 CL 95* 98  
CO2 29 31 BUN 51* 33* CREA 1.77* 1.49* * 205* CA 8.2* 9.0 MG  --  1.7 Recent Labs 11/15/18 
0451 11/14/18 
9550 11/14/18 
9243 SGOT 13*  --  28 ALT 17  --  20  
  --  136* TBILI 0.8  --  0.7 TP 6.7  --  6.6 ALB 2.8*  --  3.2*  
GLOB 3.9  --  3.4 LPSE  --  48* 59* Recent Labs 11/14/18 
9724 INR 1.0 PTP 10.1 APTT 26.4 No results for input(s): FE, TIBC, PSAT, FERR in the last 72 hours. No results found for: FOL, RBCF Recent Labs 11/15/18 
0400 11/14/18 
1700 PH 7.31* 7.21* PCO2 60* 59* PO2 94 59* No results for input(s): PHI, PO2I, PCO2I in the last 72 hours. Recent Labs 11/14/18 1993 11/14/18 
0530 11/14/18 
6332   --   --   
CKNDX 5.2*  --   --   
TROIQ 1.61* 2.28* 2.59* No results found for: CHOL, CHOLX, CHLST, CHOLV, HDL, LDL, LDLC, DLDLP, TGLX, TRIGL, TRIGP, CHHD, CHHDX Lab Results Component Value Date/Time Glucose (POC) 305 (H) 11/15/2018 07:22 AM  
 Glucose (POC) 249 (H) 11/14/2018 09:01 PM  
 Glucose (POC) 291 (H) 11/14/2018 05:18 PM  
 Glucose (POC) 269 (H) 11/14/2018 01:16 PM  
 Glucose (POC) 252 (H) 11/14/2018 09:58 AM  
 
Lab Results Component Value Date/Time Color YELLOW/STRAW 11/14/2018 03:57 AM  
 Appearance CLEAR 11/14/2018 03:57 AM  
 Specific gravity 1.020 11/14/2018 03:57 AM  
 Specific gravity 1.016 03/16/2018 05:31 PM  
 pH (UA) 5.5 11/14/2018 03:57 AM  
 Protein 100 (A) 11/14/2018 03:57 AM  
 Glucose NEGATIVE  11/14/2018 03:57 AM  
 Ketone 80 (A) 11/14/2018 03:57 AM  
 Bilirubin NEGATIVE  11/14/2018 03:57 AM  
 Urobilinogen 0.2 11/14/2018 03:57 AM  
 Nitrites NEGATIVE  11/14/2018 03:57 AM  
 Leukocyte Esterase NEGATIVE  11/14/2018 03:57 AM  
 Epithelial cells FEW 11/14/2018 03:57 AM  
 Bacteria NEGATIVE  11/14/2018 03:57 AM  
 WBC 0-4 11/14/2018 03:57 AM  
 RBC 0-5 11/14/2018 03:57 AM  
 
 
RADIOGRAPHIC STUDIES: 
CXR Results  (Last 48 hours)  
          
 11/14/18 0403  XR CHEST PORT Final result Impression:  IMPRESSION: No evidence of acute cardiopulmonary process. No significant change  
from the prior study. Narrative:  INDICATION: Shortness of breath COMPARISON: March 15, 2018 FINDINGS: AP portable imaging of the chest performed at 3:56 AM demonstrates a  
stable cardiomediastinal silhouette. Rounded opacity at the right lung base is  
not significantly changed. There is no new airspace disease or pleural effusion. No significant osseous abnormalities are seen. CT Results  (Last 48 hours)  
          
 11/14/18 1158  CT ABD PELV WO CONT Final result Impression:  IMPRESSION:  
Chest  
1. Trace right pleural effusion. 2.  No evidence of pulmonary edema or pneumonia. Abdomen: 1. No evidence of an acute intra-abdominal process. 2.  Small bowel containing right inguinal hernia without evidence of  
obstruction. Narrative:  EXAM:  CT CHEST WO CONT, CT ABD PELV WO CONT INDICATION:   dyspnea, abd pain, b/l crackles, hypoxic, nstemi, eval for  
pneumonia or pulmonary edema COMPARISON: Abdominal ultrasound March 2018. Oscar Zhao TECHNIQUE:   
Multislice helical CT was performed from the thoracic inlet to the pubic  
symphysis was performed without  intravenous contrast administration. Oral  
contrast was not administered. Contiguous 5 mm axial images were reconstructed  
and lung and soft tissue windows were generated. Coronal and sagittal  
reformations were generated. CT dose reduction was achieved through the use of a standardized protocol  
tailored for this examination and automatic exposure control for dose  
modulation. Oscar Zhao FINDINGS:  
CHEST:  
Thyroid: Unremarkable. [facility] Mediastinum/kaelyn: No pathologically enlarged mediastinal or hilar lymph nodes. [facility] Heart/vessels: Mild cardiomegaly. No pericardial effusion. Mild coronary artery  
calcifications. Oscar [employer] Lungs/Pleura: Hypoventilatory exam with respiratory artifact worst at the lung  
bases, limiting detailed evaluation. There is a trace right pleural effusion. There is a fat-containing right Bochdalek hernia. No evidence of infectious  
process. No pulmonary edema. No suspicious nodules or masses. No pneumothorax. Oscar Potjewell ABDOMEN:  
Liver: Unremarkable. No focal abnormality. Oscar Zhao Gallbladder/Biliary: No biliary dilation. Sludge in the gallbladder. Oscar Potjewell Spleen: Unremarkable. Oscar Potjewell Pancreas: Unremarkable. Oscar [email or web address] Adrenals: Unremarkable. Oscar Potjewell Kidneys: Symmetrically small. These measure approximately 8 cm in length. No  
contour deforming mass, hydronephrosis, or nephrolithiasis. Oscar Potjewell Ureters: No evidence of abnormality along the course of the ureters. Oscar Potjewell Bladder: Unremarkable. Oscar Zhao Gastrointestinal tract: Small bowel containing right inguinal hernia. No  
evidence of obstruction or focal inflammation. Trace fluid in the right inguinal  
hernia sac. Oscar Zhao Peritoneum: Trace fluid in the right inguinal hernia sac. Otherwise no evidence  
of ascites. No pneumoperitoneum. Oneida Johnathon Retroperitoneum: Mild atherosclerotic disease of the aorta and its branches. No  
aneurysmal dilation. Reproductive System: Unremarkable. Oneida Johnathon Bones and soft tissues: No acute or aggressive osseous lesion. Subcutaneous  
tissues are unremarkable. .  
   
   
  
 11/14/18 1158  CT CHEST WO CONT Final result Impression:  IMPRESSION:  
Chest  
1. Trace right pleural effusion. 2.  No evidence of pulmonary edema or pneumonia. Abdomen: 1. No evidence of an acute intra-abdominal process. 2.  Small bowel containing right inguinal hernia without evidence of  
obstruction. Narrative:  EXAM:  CT CHEST WO CONT, CT ABD PELV WO CONT INDICATION:   dyspnea, abd pain, b/l crackles, hypoxic, nstemi, eval for  
pneumonia or pulmonary edema COMPARISON: Abdominal ultrasound March 2018. Oneida Diego TECHNIQUE:   
Multislice helical CT was performed from the thoracic inlet to the pubic  
symphysis was performed without  intravenous contrast administration. Oral  
contrast was not administered. Contiguous 5 mm axial images were reconstructed  
and lung and soft tissue windows were generated. Coronal and sagittal  
reformations were generated. CT dose reduction was achieved through the use of a standardized protocol  
tailored for this examination and automatic exposure control for dose  
modulation. Oneida Johnathon FINDINGS:  
CHEST:  
Thyroid: Unremarkable. Oneida Johnathon Mediastinum/kaelyn: No pathologically enlarged mediastinal or hilar lymph nodes. Oneida Johnathon Heart/vessels: Mild cardiomegaly. No pericardial effusion. Mild coronary artery  
calcifications. Oneida Johnathon Lungs/Pleura: Hypoventilatory exam with respiratory artifact worst at the lung  
bases, limiting detailed evaluation. There is a trace right pleural effusion. There is a fat-containing right Bochdalek hernia. No evidence of infectious process. No pulmonary edema. No suspicious nodules or masses. No pneumothorax. Enma Rotunda ABDOMEN:  
Liver: Unremarkable. No focal abnormality. Enma Rotunda Gallbladder/Biliary: No biliary dilation. Sludge in the gallbladder. [place] Rotunda Spleen: Unremarkable. Enma Rotunda Pancreas: Unremarkable. [place] Rotunda Adrenals: Unremarkable. [place] Rotunda Kidneys: Symmetrically small. These measure approximately 8 cm in length. No  
contour deforming mass, hydronephrosis, or nephrolithiasis. [place] Rotunda Ureters: No evidence of abnormality along the course of the ureters. Enma Rotunda Bladder: Unremarkable. Enma Rotunda Gastrointestinal tract: Small bowel containing right inguinal hernia. No  
evidence of obstruction or focal inflammation. Trace fluid in the right inguinal  
hernia sac. [place] Rotunda Peritoneum: Trace fluid in the right inguinal hernia sac. Otherwise no evidence  
of ascites. No pneumoperitoneum. Enma Rotunda Retroperitoneum: Mild atherosclerotic disease of the aorta and its branches. No  
aneurysmal dilation. Reproductive System: Unremarkable. Enma Rotunda Bones and soft tissues: No acute or aggressive osseous lesion. Subcutaneous  
tissues are unremarkable. [place] Rotunda Echo Results  (Last 48 hours)  
          
 18 0000  2D ECHO COMPLETE ADULT (TTE) W OR WO CONTR Final result Narrative:  [address]  
[address]  
[phone] Transthoracic Echocardiogram  
   
Patient: Renu Norris  
MRN: [id number] ACCT #: [de-identified] : 1960 Age: 59 years Gender: Female Height: 61 in  
Weight: 224.6 lb  
BSA: 1.99 mï¾² BP: 139 / 65 mmHg Study date: 2018 Status: Routine Location: Bedside [facility] #: [id number] Allergies: NO KNOWN ALLERGENS Ordering Physician:  Kirk Webb NP Reading Group:  *RCA GROUP Technologist:  Rhea Kurtz [facility] Reading Physician:  Ekaterina Rivera. DAYTON Larsen Baas:  
Left ventricle: Systolic function was normal. Ejection fraction was estimated in the range of 55 % to 60 %. No obvious wall motion  
abnormalities identified in the views obtained. Right ventricle: The ventricle was mildly dilated. Left atrium: The atrium was mildly dilated. Right atrium: The atrium was mildly dilated. Inferior vena cava, hepatic veins: The inferior vena cava was dilated. INDICATIONS: Assess left ventricular function. PROCEDURE: This was a routine study. The study included complete 2D  
imaging, M-mode, complete spectral Doppler, and color Doppler. Systolic  
blood pressure was 139 mmHg, at the start of the study. Diastolic blood  
pressure was 65 mmHg, at the start of the study. Intravenous contrast  
(Definity) was administered. Image quality was adequate. LEFT VENTRICLE: Size was normal. Systolic function was normal. Ejection  
fraction was estimated in the range of 55 % to 60 %. No obvious wall  
motion abnormalities identified in the views obtained. Wall thickness was  
normal.  
   
RIGHT VENTRICLE: The ventricle was mildly dilated. Systolic function was  
normal. Wall thickness was normal.  
   
LEFT ATRIUM: The atrium was mildly dilated. RIGHT ATRIUM: The atrium was mildly dilated. MITRAL VALVE: Normal valve structure. There was normal leaflet separation. DOPPLER: The transmitral velocity was within the normal range. There was  
no evidence for stenosis. There was trivial regurgitation. AORTIC VALVE: The valve was trileaflet. Leaflets exhibited normal  
thickness and normal cuspal separation. DOPPLER: Transaortic velocity was  
within the normal range. There was no stenosis. There was no regurgitation. TRICUSPID VALVE: Normal valve structure. There was normal leaflet  
separation. DOPPLER: The transtricuspid velocity was within the normal  
range. There was no evidence for tricuspid stenosis. There was trivial  
regurgitation. Insufficient tricuspid regurgitation to estimate pulmonary artery pressure. PULMONIC VALVE: Not well visualized, but normal Doppler findings. AORTA: The root exhibited normal size. SYSTEMIC VEINS: IVC: The inferior vena cava was dilated. PERICARDIUM: There was no pericardial effusion. SYSTEM MEASUREMENT TABLES  
   
2D  
LVOT Diam: 2 cm Ao Diam: 2.5 cm  
LA Diam: 3.7 cm LAAd A4C: 22.2 cm2 LAEDV A-L A4C: 69.3 ml  
LAEDV MOD A4C: 66 ml LALd A4C: 6 cm  
%FS: 37.8 % EDV(Teich): 102.6 ml  
EF(Teich): 67.9 % ESV(Teich): 32.9 ml IVSd: 1.3 cm LVIDd: 4.7 cm  
LVIDs: 2.9 cm  
LVPWd: 1.1 cm  
SI(Teich): 35.1 ml/m2 SV(Teich): 69.7 ml Rajni: 23.4 cm2 RAEDV A-L: 75.4 ml  
RAEDV MOD: 73.8 ml RALd: 6.2 cm  
EF(Cube): 76 % ESV(Cube): 25 ml  
SI(Cube): 39.8 ml/m2 SV(Cube): 79.2 ml  
   
CW  
AV Vmax: 1.2 m/s AV maxP.1 mmHg PW  
ISRAEL Vmax: 2.2 cm2 ISRAEL Vmax, Pt: 2.2 cm2 LVOT Env. Ti: 282.8 ms  
LVOT maxPG: 3 mmHg LVOT meanP mmHg LVOT VTI: 19.1 cm  
LVOT Vmax: 0.9 m/s LVOT Vmean: 0.7 m/s A': 0.2 m/s  
E': 0.1 m/s LVSI Dopp: 29.5 ml/m2 LVSV Dopp: 58.8 ml  
E/E': 8  
MV A Yovanny: 1 m/s  
MV Dec [place]: 3.4 m/s2 MV DecT: 164.1 ms  
MV E Yovanny: 0.6 m/s  
MV E/A Ratio: 0.6 MV PHT: 47.6 ms  
MVA By PHT: 4.6 cm2 HR: 89.4 BPM  
   
Prepared and E-signed by  
   
Radha Brunner. Alexis Funk M.D. Signed 2018 12:21:47 VENOUS DOPPLER results  (Last 48 hours) None CULTURES: 
 
No results found for: SDES Lab Results Component Value Date/Time Culture result: NO GROWTH 1 DAY 2018 04:10 AM  
 Culture result: NO GROWTH 1 DAY 2018 04:05 AM  
 Culture result: NO GROWTH 6 DAYS 2018 06:46 AM  
  
 
 
Signed: Kristofer Grider MD 
 
This note will not be viewable in [address]

## 2018-11-15 NOTE — WOUND CARE
Wound care nurse consult from staff nurse stating \" breakdown to gluteal cleft, blisters to ble\". Patient is a 60 y/o morbidly obese CF admitted for COPD exacerbation. 5'1\", 223 lbs. Patient oriented to self and poor historian, would not open eyes while in room. Kept asking for the wraps with Velcro for her legs over and over again. Patient appears to have some intellectual disability also. Past Medical History:  
Diagnosis Date  Arthritis  Asthma  Bronchitis  GERD (gastroesophageal reflux disease)  Hypercholesterolemia  Hypertension  Hypothyroid  IDDM (insulin dependent diabetes mellitus) ([facility] [address]  Morbid obesity ([facility] [address]  Peripheral neuropathy  Thyroid disease  Venous insufficiency Patient has scabs from trauma to right knee and righ LE, chronic venous stasis skin - hemosiderin stained, edematous, scaring, purple/maroon scratch marks to LLE. No blisters seen. MASD to gluteal cleft - partial thickness wound aprox 3.5 x 1 cm. [place] external female wick in place WOUND POA CONDITIONS Wound Leg Lower Anterior; Left (Active) DRESSING TYPE Open to air 11/15/2018 11:37 AM  
Non-Pressure Injury Partial thickness (epider/derm) 11/15/2018 11:37 AM  
Drainage Amount  None 11/15/2018 11:37 AM  
Wound Odor None 11/15/2018 11:37 AM  
Periwound Skin Condition Intact;Edematous 11/15/2018 11:37 AM  
Dressing Type Applied Open to air 11/15/2018 11:37 AM  
Procedure Tolerated Well 11/15/2018 11:37 AM  
Number of days: 220 Wound Knee Anterior;Right (Active) DRESSING TYPE Open to air 11/15/2018 11:37 AM  
Non-Pressure Injury Partial thickness (epider/derm) 11/15/2018 11:37 AM  
Wound Length (cm) 1.5 cm 11/15/2018 11:37 AM  
Wound Width (cm) 2.5 cm 11/15/2018 11:37 AM  
Wound Surface area (cm^2) 3.75 cm^2 11/15/2018 11:37 AM  
Condition of Base Eschar 11/15/2018 11:37 AM  
Tissue Type Black 11/15/2018 11:37 AM  
Drainage Amount  None 11/15/2018 11:37 AM  
 Wound Odor None 11/15/2018 11:37 AM  
Periwound Skin Condition Intact 11/15/2018 11:37 AM  
Dressing Type Applied Open to air 11/15/2018 11:37 AM  
Number of days: 0 Wound Leg Lower Anterior;Right;Lateral (Active) DRESSING TYPE Open to air 11/15/2018 11:37 AM  
Non-Pressure Injury Partial thickness (epider/derm) 11/15/2018 11:37 AM  
Wound Length (cm) 2 cm 11/15/2018 11:37 AM  
Wound Width (cm) 2 cm 11/15/2018 11:37 AM  
Wound Surface area (cm^2) 4 cm^2 11/15/2018 11:37 AM  
Condition of Base Eschar 11/15/2018 11:37 AM  
Tissue Type Black 11/15/2018 11:37 AM  
Drainage Amount  None 11/15/2018 11:37 AM  
Wound Odor None 11/15/2018 11:37 AM  
Periwound Skin Condition Intact;Edematous 11/15/2018 11:37 AM  
Dressing Type Applied Open to air 11/15/2018 11:37 AM  
Procedure Tolerated Well 11/15/2018 11:37 AM  
Number of days: 0 Wound Gluteal fold/cleft Mid (Active) DRESSING TYPE Open to air 11/15/2018 11:37 AM  
Non-Pressure Injury Partial thickness (epider/derm) 11/15/2018 11:37 AM  
Wound Length (cm) 3 cm 11/15/2018 11:37 AM  
Wound Width (cm) 1 cm 11/15/2018 11:37 AM  
Wound Depth (cm) 0.1 11/15/2018 11:37 AM  
Wound Surface area (cm^2) 3 cm^2 11/15/2018 11:37 AM  
Condition of Base [place] 11/15/2018 11:37 AM  
Condition of Edges Open 11/15/2018 11:37 AM  
Tissue Type Pink 11/15/2018 11:37 AM  
Tissue Type Percent Pink 100 11/15/2018 11:37 AM  
Drainage Amount  None 11/15/2018 11:37 AM  
Wound Odor None 11/15/2018 11:37 AM  
Periwound Skin Condition Intact 11/15/2018 11:37 AM  
Dressing Type Applied Open to air 11/15/2018 11:37 AM  
Procedure Tolerated Fair 11/15/2018 11:37 AM  
Number of days: 0 Recommend: MASD to gluteal cleft: clean at least TID of urine with soap and water, pat dry. Apply a thin layer of ES Desitin to wounds and skin. BLE scabs are dry and stable an leave ASHELY.  
 
NO CARINE hose or Ace wraps to BLE's to treat edema - despite patient's insistence. Legs need to be elevated with heels floated in and out of bed.  
 
Peyton Marquis RN, [employer]

## 2018-11-15 NOTE — CARDIO/PULMONARY
CardioPulm Rehab:  
 
Chart reviewed. Pt is a 59 y.o. admitted with COPD and NSTEMI. Cardiac cath possibly tomorrow, pending creatinine levels. Patient given printed teaching materials on MI and the cardiac diet. Pt denies ever having a heart attack. Pt states she has had a cath before and refuses another, however she was describing a CT scan. Discussed cardiac cath and what was to be expected. Reminded her the cath is on hold for today. Pt defers all medical decisions including cardiac teaching to her sister. States she is mentally challenged and will not understand what is going on. Encouraged patient to verbalize concerns/questions. Will follow as appropriate for MI education and support with family present.

## 2018-11-16 ENCOUNTER — APPOINTMENT (OUTPATIENT)
Dept: ULTRASOUND IMAGING | Age: 58
DRG: 280 | End: 2018-11-16
Attending: INTERNAL MEDICINE
Payer: MEDICARE

## 2018-11-16 LAB
ANION GAP SERPL CALC-SCNC: 5 MMOL/L (ref 5–15)
BUN SERPL-MCNC: 58 MG/DL (ref 6–20)
BUN/CREAT SERPL: 32 (ref 12–20)
CALCIUM SERPL-MCNC: 8.6 MG/DL (ref 8.5–10.1)
CHLORIDE SERPL-SCNC: 97 MMOL/L (ref 97–108)
CO2 SERPL-SCNC: 29 MMOL/L (ref 21–32)
CREAT SERPL-MCNC: 1.82 MG/DL (ref 0.55–1.02)
CREAT UR-MCNC: 33.7 MG/DL
CREAT UR-MCNC: 34.5 MG/DL
ERYTHROCYTE [DISTWIDTH] IN BLOOD BY AUTOMATED COUNT: 17.2 % (ref 11.5–14.5)
GLUCOSE BLD STRIP.AUTO-MCNC: 257 MG/DL (ref 65–100)
GLUCOSE BLD STRIP.AUTO-MCNC: 261 MG/DL (ref 65–100)
GLUCOSE BLD STRIP.AUTO-MCNC: 287 MG/DL (ref 65–100)
GLUCOSE BLD STRIP.AUTO-MCNC: 306 MG/DL (ref 65–100)
GLUCOSE BLD STRIP.AUTO-MCNC: 318 MG/DL (ref 65–100)
GLUCOSE SERPL-MCNC: 295 MG/DL (ref 65–100)
HCT VFR BLD AUTO: 36.9 % (ref 35–47)
HGB BLD-MCNC: 11.3 G/DL (ref 11.5–16)
MAGNESIUM SERPL-MCNC: 2.5 MG/DL (ref 1.6–2.4)
MCH RBC QN AUTO: 24.5 PG (ref 26–34)
MCHC RBC AUTO-ENTMCNC: 30.6 G/DL (ref 30–36.5)
MCV RBC AUTO: 79.9 FL (ref 80–99)
NRBC # BLD: 0.02 K/UL (ref 0–0.01)
NRBC BLD-RTO: 0.2 PER 100 WBC
OSMOLALITY UR: 500 MOSM/KG H2O
PHOSPHATE SERPL-MCNC: 3.1 MG/DL (ref 2.6–4.7)
PLATELET # BLD AUTO: 241 K/UL (ref 150–400)
PMV BLD AUTO: 10.7 FL (ref 8.9–12.9)
POTASSIUM SERPL-SCNC: 4.4 MMOL/L (ref 3.5–5.1)
PROT UR-MCNC: 15 MG/DL (ref 0–11.9)
PROT/CREAT UR-RTO: 0.4
RBC # BLD AUTO: 4.62 M/UL (ref 3.8–5.2)
SERVICE CMNT-IMP: ABNORMAL
SODIUM SERPL-SCNC: 131 MMOL/L (ref 136–145)
SODIUM UR-SCNC: 44 MMOL/L
UUN UR-MCNC: 647 MG/DL
WBC # BLD AUTO: 11.8 K/UL (ref 3.6–11)

## 2018-11-16 PROCEDURE — 77030010221 HC SPLNT WR POS TELE -B

## 2018-11-16 PROCEDURE — 80048 BASIC METABOLIC PNL TOTAL CA: CPT

## 2018-11-16 PROCEDURE — 84156 ASSAY OF PROTEIN URINE: CPT

## 2018-11-16 PROCEDURE — 83735 ASSAY OF MAGNESIUM: CPT

## 2018-11-16 PROCEDURE — B2111ZZ FLUOROSCOPY OF MULTIPLE CORONARY ARTERIES USING LOW OSMOLAR CONTRAST: ICD-10-PCS | Performed by: INTERNAL MEDICINE

## 2018-11-16 PROCEDURE — C1894 INTRO/SHEATH, NON-LASER: HCPCS

## 2018-11-16 PROCEDURE — C1769 GUIDE WIRE: HCPCS

## 2018-11-16 PROCEDURE — 94640 AIRWAY INHALATION TREATMENT: CPT

## 2018-11-16 PROCEDURE — 85027 COMPLETE CBC AUTOMATED: CPT

## 2018-11-16 PROCEDURE — 4A023N7 MEASUREMENT OF CARDIAC SAMPLING AND PRESSURE, LEFT HEART, PERCUTANEOUS APPROACH: ICD-10-PCS | Performed by: INTERNAL MEDICINE

## 2018-11-16 PROCEDURE — 74011636637 HC RX REV CODE- 636/637: Performed by: HOSPITALIST

## 2018-11-16 PROCEDURE — 84540 ASSAY OF URINE/UREA-N: CPT

## 2018-11-16 PROCEDURE — 74011250636 HC RX REV CODE- 250/636

## 2018-11-16 PROCEDURE — 77030028837 HC SYR ANGI PWR INJ COEU -A

## 2018-11-16 PROCEDURE — 74011250636 HC RX REV CODE- 250/636: Performed by: INTERNAL MEDICINE

## 2018-11-16 PROCEDURE — 93458 L HRT ARTERY/VENTRICLE ANGIO: CPT

## 2018-11-16 PROCEDURE — 77010033678 HC OXYGEN DAILY

## 2018-11-16 PROCEDURE — 77030019698 HC SYR ANGI MDLON MRTM -A

## 2018-11-16 PROCEDURE — 77030008543 HC TBNG MON PRSS MRTM -A

## 2018-11-16 PROCEDURE — 74011000250 HC RX REV CODE- 250: Performed by: INTERNAL MEDICINE

## 2018-11-16 PROCEDURE — 77030019569 HC BND COMPR RAD TERU -B

## 2018-11-16 PROCEDURE — 74011250636 HC RX REV CODE- 250/636: Performed by: HOSPITALIST

## 2018-11-16 PROCEDURE — 74011000250 HC RX REV CODE- 250

## 2018-11-16 PROCEDURE — 84300 ASSAY OF URINE SODIUM: CPT

## 2018-11-16 PROCEDURE — 74011636320 HC RX REV CODE- 636/320

## 2018-11-16 PROCEDURE — 84100 ASSAY OF PHOSPHORUS: CPT

## 2018-11-16 PROCEDURE — 82962 GLUCOSE BLOOD TEST: CPT

## 2018-11-16 PROCEDURE — 77030004549 HC CATH ANGI DX PRF MRTM -A

## 2018-11-16 PROCEDURE — 87205 SMEAR GRAM STAIN: CPT

## 2018-11-16 PROCEDURE — 51798 US URINE CAPACITY MEASURE: CPT

## 2018-11-16 PROCEDURE — 82570 ASSAY OF URINE CREATININE: CPT

## 2018-11-16 PROCEDURE — 65660000000 HC RM CCU STEPDOWN

## 2018-11-16 PROCEDURE — 74011250637 HC RX REV CODE- 250/637: Performed by: NURSE PRACTITIONER

## 2018-11-16 PROCEDURE — B2151ZZ FLUOROSCOPY OF LEFT HEART USING LOW OSMOLAR CONTRAST: ICD-10-PCS | Performed by: INTERNAL MEDICINE

## 2018-11-16 PROCEDURE — 83935 ASSAY OF URINE OSMOLALITY: CPT

## 2018-11-16 PROCEDURE — 77030013797 HC KT TRNSDUC PRSSR EDWD -A

## 2018-11-16 PROCEDURE — 36415 COLL VENOUS BLD VENIPUNCTURE: CPT

## 2018-11-16 PROCEDURE — 77030015766

## 2018-11-16 PROCEDURE — 74011250637 HC RX REV CODE- 250/637: Performed by: HOSPITALIST

## 2018-11-16 PROCEDURE — 76770 US EXAM ABDO BACK WALL COMP: CPT

## 2018-11-16 RX ORDER — FENTANYL CITRATE 50 UG/ML
INJECTION, SOLUTION INTRAMUSCULAR; INTRAVENOUS
Status: COMPLETED
Start: 2018-11-16 | End: 2018-11-16

## 2018-11-16 RX ORDER — HEPARIN SODIUM 200 [USP'U]/100ML
500 INJECTION, SOLUTION INTRAVENOUS ONCE
Status: COMPLETED | OUTPATIENT
Start: 2018-11-16 | End: 2018-11-16

## 2018-11-16 RX ORDER — LIDOCAINE HYDROCHLORIDE 10 MG/ML
INJECTION, SOLUTION EPIDURAL; INFILTRATION; INTRACAUDAL; PERINEURAL
Status: COMPLETED
Start: 2018-11-16 | End: 2018-11-16

## 2018-11-16 RX ORDER — VERAPAMIL HYDROCHLORIDE 2.5 MG/ML
INJECTION, SOLUTION INTRAVENOUS
Status: COMPLETED
Start: 2018-11-16 | End: 2018-11-16

## 2018-11-16 RX ORDER — FENTANYL CITRATE 50 UG/ML
25-50 INJECTION, SOLUTION INTRAMUSCULAR; INTRAVENOUS
Status: DISCONTINUED | OUTPATIENT
Start: 2018-11-16 | End: 2018-11-16

## 2018-11-16 RX ORDER — MIDAZOLAM HYDROCHLORIDE 1 MG/ML
.5-2 INJECTION, SOLUTION INTRAMUSCULAR; INTRAVENOUS
Status: DISCONTINUED | OUTPATIENT
Start: 2018-11-16 | End: 2018-11-16

## 2018-11-16 RX ORDER — MIDAZOLAM HYDROCHLORIDE 1 MG/ML
INJECTION, SOLUTION INTRAMUSCULAR; INTRAVENOUS
Status: COMPLETED
Start: 2018-11-16 | End: 2018-11-16

## 2018-11-16 RX ORDER — HEPARIN SODIUM 1000 [USP'U]/ML
2500 INJECTION, SOLUTION INTRAVENOUS; SUBCUTANEOUS ONCE
Status: COMPLETED | OUTPATIENT
Start: 2018-11-16 | End: 2018-11-16

## 2018-11-16 RX ORDER — VERAPAMIL HYDROCHLORIDE 2.5 MG/ML
2.5 INJECTION, SOLUTION INTRAVENOUS ONCE
Status: COMPLETED | OUTPATIENT
Start: 2018-11-16 | End: 2018-11-16

## 2018-11-16 RX ORDER — HEPARIN SODIUM 1000 [USP'U]/ML
INJECTION, SOLUTION INTRAVENOUS; SUBCUTANEOUS
Status: COMPLETED
Start: 2018-11-16 | End: 2018-11-16

## 2018-11-16 RX ORDER — HEPARIN SODIUM 200 [USP'U]/100ML
INJECTION, SOLUTION INTRAVENOUS
Status: COMPLETED
Start: 2018-11-16 | End: 2018-11-16

## 2018-11-16 RX ORDER — LIDOCAINE HYDROCHLORIDE 10 MG/ML
1-30 INJECTION, SOLUTION EPIDURAL; INFILTRATION; INTRACAUDAL; PERINEURAL
Status: DISCONTINUED | OUTPATIENT
Start: 2018-11-16 | End: 2018-11-16

## 2018-11-16 RX ADMIN — LEVOTHYROXINE SODIUM 150 MCG: 150 TABLET ORAL at 09:12

## 2018-11-16 RX ADMIN — METHYLPREDNISOLONE SODIUM SUCCINATE 40 MG: 40 INJECTION, POWDER, FOR SOLUTION INTRAMUSCULAR; INTRAVENOUS at 21:20

## 2018-11-16 RX ADMIN — IPRATROPIUM BROMIDE AND ALBUTEROL SULFATE 3 ML: .5; 3 SOLUTION RESPIRATORY (INHALATION) at 15:21

## 2018-11-16 RX ADMIN — SODIUM CHLORIDE 100 ML/HR: 900 INJECTION, SOLUTION INTRAVENOUS at 05:04

## 2018-11-16 RX ADMIN — IOPAMIDOL 65 ML: 755 INJECTION, SOLUTION INTRAVENOUS at 08:18

## 2018-11-16 RX ADMIN — MIDAZOLAM HYDROCHLORIDE 1 MG: 1 INJECTION, SOLUTION INTRAMUSCULAR; INTRAVENOUS at 07:46

## 2018-11-16 RX ADMIN — Medication 10 ML: at 21:21

## 2018-11-16 RX ADMIN — INSULIN LISPRO 7 UNITS: 100 INJECTION, SOLUTION INTRAVENOUS; SUBCUTANEOUS at 10:30

## 2018-11-16 RX ADMIN — Medication 10 ML: at 05:02

## 2018-11-16 RX ADMIN — HEPARIN SODIUM 1000 UNITS: 200 INJECTION, SOLUTION INTRAVENOUS at 08:00

## 2018-11-16 RX ADMIN — SODIUM CHLORIDE 50 ML/HR: 900 INJECTION, SOLUTION INTRAVENOUS at 20:22

## 2018-11-16 RX ADMIN — INSULIN LISPRO 7 UNITS: 100 INJECTION, SOLUTION INTRAVENOUS; SUBCUTANEOUS at 09:12

## 2018-11-16 RX ADMIN — METOPROLOL TARTRATE 12.5 MG: 25 TABLET ORAL at 09:11

## 2018-11-16 RX ADMIN — Medication 10 ML: at 09:13

## 2018-11-16 RX ADMIN — GABAPENTIN 600 MG: 300 CAPSULE ORAL at 21:20

## 2018-11-16 RX ADMIN — ATORVASTATIN CALCIUM 20 MG: 20 TABLET, FILM COATED ORAL at 21:20

## 2018-11-16 RX ADMIN — INSULIN LISPRO 5 UNITS: 100 INJECTION, SOLUTION INTRAVENOUS; SUBCUTANEOUS at 21:21

## 2018-11-16 RX ADMIN — HEPARIN SODIUM 2500 UNITS: 1000 INJECTION, SOLUTION INTRAVENOUS; SUBCUTANEOUS at 08:05

## 2018-11-16 RX ADMIN — INSULIN GLARGINE 12 UNITS: 100 INJECTION, SOLUTION SUBCUTANEOUS at 17:34

## 2018-11-16 RX ADMIN — FENTANYL CITRATE 25 MCG: 50 INJECTION, SOLUTION INTRAMUSCULAR; INTRAVENOUS at 08:06

## 2018-11-16 RX ADMIN — LIDOCAINE HYDROCHLORIDE 2 ML: 10 INJECTION, SOLUTION EPIDURAL; INFILTRATION; INTRACAUDAL; PERINEURAL at 08:02

## 2018-11-16 RX ADMIN — METOPROLOL TARTRATE 12.5 MG: 25 TABLET ORAL at 21:20

## 2018-11-16 RX ADMIN — IPRATROPIUM BROMIDE AND ALBUTEROL SULFATE 3 ML: .5; 3 SOLUTION RESPIRATORY (INHALATION) at 21:11

## 2018-11-16 RX ADMIN — VERAPAMIL HYDROCHLORIDE 2.5 MG: 2.5 INJECTION INTRAVENOUS at 08:04

## 2018-11-16 RX ADMIN — IPRATROPIUM BROMIDE AND ALBUTEROL SULFATE 3 ML: .5; 3 SOLUTION RESPIRATORY (INHALATION) at 11:36

## 2018-11-16 RX ADMIN — ASPIRIN 81 MG: 81 TABLET, COATED ORAL at 09:12

## 2018-11-16 RX ADMIN — INSULIN LISPRO 10 UNITS: 100 INJECTION, SOLUTION INTRAVENOUS; SUBCUTANEOUS at 17:35

## 2018-11-16 RX ADMIN — HEPARIN SODIUM 2500 UNITS: 1000 INJECTION INTRAVENOUS; SUBCUTANEOUS at 08:05

## 2018-11-16 RX ADMIN — VERAPAMIL HYDROCHLORIDE 2.5 MG: 2.5 INJECTION, SOLUTION INTRAVENOUS at 08:04

## 2018-11-16 RX ADMIN — NITROGLYCERIN 200 MCG: 5 INJECTION, SOLUTION INTRAVENOUS at 08:05

## 2018-11-16 RX ADMIN — METHYLPREDNISOLONE SODIUM SUCCINATE 40 MG: 40 INJECTION, POWDER, FOR SOLUTION INTRAMUSCULAR; INTRAVENOUS at 09:11

## 2018-11-16 NOTE — PROGRESS NOTES
0930: incentive spirometer given along with teaching. Will follow-up and make sure she uses it appropriately during the day

## 2018-11-16 NOTE — PROGRESS NOTES
Problem: Falls - Risk of 
Goal: *Absence of Falls Document April Christiano Fall Risk and appropriate interventions in the flowsheet. Outcome: Progressing Towards Goal 
Fall Risk Interventions: 
Mobility Interventions: Assess mobility with egress test, Bed/chair exit alarm, OT consult for ADLs, Patient to call before getting OOB, PT Consult for mobility concerns Medication Interventions: Assess postural VS orthostatic hypotension, Bed/chair exit alarm, Patient to call before getting OOB, Teach patient to arise slowly Elimination Interventions: Bed/chair exit alarm, Call light in reach, Patient to call for help with toileting needs, Toileting schedule/hourly rounds History of Falls Interventions: Bed/chair exit alarm, Consult care management for discharge planning, Door open when patient unattended, Room close to nurse's station

## 2018-11-16 NOTE — CONSULTS
Yonatan Meek MAYRA Davis  QIF:[id number]   HLU:[birth year]   [id number]      arf - multiple etiology  Cardiorenal syn  Progression of ckd- left renal atrophy on renal usg in march  nsaids use    ckd   Dm 2  NSATEMI  S/p cath    Plan  Hold torsemide today  ivf today    Cr. Trends  Results for Macho Renteria (MRN [id number]) as of 11/16/2018 11:22   Ref. Range 3/16/2018 15:30 3/17/2018 02:48 3/19/2018 05:15 3/20/2018 04:04 11/14/2018 03:36 11/15/2018 04:51 11/16/2018 05:01   Creatinine Latest Ref Range: 0.55 - 1.02 MG/DL 2.64 (H) 1.86 (H) 1.03 (H) 1.27 (H) 1.49 (H) 1.77 (H) 1.82 (H)                     Sebas Guajardo MD  [facility]  [facility]  [facility], [address] [facility]  Phone - [phone]         Fax - [phone] [facility] Office  [address] [place] [address], [facility]  Phone - [phone]        Fax - [phone]     www. [facility]

## 2018-11-16 NOTE — PROGRESS NOTES
PCU SHIFT NURSING NOTE Bedside and Verbal shift change report given to REY Mccauley (oncoming nurse) by Nino Watts (offgoing nurse). Report included the following information SBAR, Kardex, ED Summary, Procedure Summary, Intake/Output, MAR, Recent Results, Med Rec Status, Cardiac Rhythm NSR and Alarm Parameters . Shift Summary: Bedside report received at this time. Patient voiced no c/o pain and or discomfort. Patient tolerated meds without difficulty. Patient refused some HS medications. Patient continues on O2 via nasal cannula. No s/sx of distress noted at this time. Admission Date 11/14/2018 Admission Diagnosis Acute respiratory failure with hypoxia (HCC) NSTEMI (non-ST elevated myocardial infarction) ([facility] [address] COPD exacerbation ([facility] [address] Consults IP CONSULT TO CARDIOLOGY 
IP CONSULT TO PULMONOLOGY Consults []PT []OT []Speech  
[]Case Management  
  
[] Palliative Cardiac Monitoring Order []Yes []No  
 
IV drips []Yes Drip:                            Dose: 
Drip:                            Dose: 
Drip:                            Dose:  
[]No  
 
GI Prophylaxis []Yes []No  
 
 
 
DVT Prophylaxis SCDs:     
     
 Charles stockings:     
  
[] Medication []Contraindicated []None Activity Level Activity Level: Up with Assistance Activity Assistance: Partial (two people) Purposeful Rounding every 1-2 hour? []Yes Mckoy Score  Total Score: 3 Bed Alarm (If score 3 or >) []Yes  
[] Refused (See signed refusal form in chart) Cyrus Score  Cyrus Score: 18 Cyrus Score (if score 14 or less) []PMT consult  
[]Wound Care consult []Specialty bed  
[] Nutrition consult Needs prior to discharge:  
Home O2 required:   
[]Yes []No  
 If yes, how much O2 required? Other:  
 Last Bowel Movement: Last Bowel Movement Date: 11/15/18 Influenza Vaccine Pneumonia Vaccine Diet Active Orders Diet DIET NPO Past Midnight LDAs              
Peripheral IV 11/15/18 Right Wrist (Active) Site Assessment Clean, dry, & intact 11/15/2018  7:18 PM  
Phlebitis Assessment 0 11/15/2018  7:18 PM  
Infiltration Assessment 0 11/15/2018  7:18 PM  
Dressing Status Clean, dry, & intact 11/15/2018  7:18 PM  
Dressing Type Tape;Transparent 11/15/2018  7:18 PM  
Hub Color/Line Status Blue; Infusing;Flushed;Patent 11/15/2018  7:18 PM  
Action Taken Open ports on tubing capped 11/15/2018  7:18 PM  
Alcohol Cap Used Yes 11/15/2018  7:18 PM  
   
Peripheral IV 11/16/18 Anterior; Left Antecubital (Active) Site Assessment Clean, dry, & intact 11/16/2018  1:29 AM  
Phlebitis Assessment 0 11/16/2018  1:29 AM  
Infiltration Assessment 0 11/16/2018  1:29 AM  
Dressing Status New;Clean, dry, & intact 11/16/2018  1:29 AM  
Dressing Type Transparent;Tape 11/16/2018  1:29 AM  
Hub Color/Line Status Blue;Flushed;Capped 11/16/2018  1:29 AM  
      
External Female Catheter 11/14/18 (Active) Site Assessment Clean, dry, & intact 11/15/2018  7:18 PM  
Repositioned Yes 11/15/2018  7:18 PM  
Perineal Care Yes 11/15/2018  7:18 PM  
Wick Changed Yes 11/15/2018  7:18 PM  
Suction Canister/Tubing Changed No 11/15/2018  7:18 PM  
Urine Output (mL) 200 ml 11/15/2018 11:37 AM  
            
Urinary Catheter Intake & Output Date 11/15/18 0700 - 11/16/18 0659 11/16/18 0700 - 11/17/18 5848 Shift [id number] 8242-0546 24 Hour Total 8409-2124 7961-5830 24 Hour Total  
INTAKE Shift Total(mL/kg) OUTPUT Urine(mL/kg/hr) 200(0.2)  200 Urine Occurrence(s) 1 x  1 x Urine Output (mL) (External Female Catheter 11/14/18) 200  200 Shift Total(mL/kg) [id number])  [id number]) NET -200  -200 Weight (kg) 101.4 101.4 101.4 101.4 101.4 101.4 Readmission Risk Assessment Tool Score Medium Risk 25 Total Score 3 Has Seen PCP in Last 6 Months (Yes=3, No=0)  
 4 IP Visits Last 12 Months (1-3=4, 4=9, >4=11) 5 Pt. Coverage (Medicare=5 , Medicaid, or Self-Pay=4) 6 Charlson Comorbidity Score (Age + Comorbid Conditions) Criteria that do not apply:  
 . Living with Significant Other. Assisted Living. LTAC. SNF. or  
Rehab Patient Length of Stay (>5 days = 3) Expected Length of Stay 4d 4h Actual Length of Stay 2

## 2018-11-16 NOTE — PROGRESS NOTES
1200: WICK used to attempt to obtain urine sample for labs. 1400: pt had not voided yet. 1530: voided(incontinence, still could not obtain sample). Bladder scanned with 317mL remainiing in baldder. I simply mentioned the possibility of a urinary catheter(straight cath) and the pt refused. Pt's sister called at that time and she tried to convince pt to get straight cath if necessary. Bedside and Verbal shift change report given to Nidhi (oncoming nurse) by Micheline Clancy (offgoing nurse). Report included the following information SBAR, Kardex, Intake/Output, MAR, Accordion and Recent Results.

## 2018-11-16 NOTE — PROGRESS NOTES
PCU SHIFT NURSING NOTE Bedside and Verbal shift change report given to Tasneem Patel RN (oncoming nurse) by Lindsay Stewart RN (offgoing nurse). Report included the following information SBAR, Kardex, ED Summary, Intake/Output, MAR, Recent Results, Cardiac Rhythm NSR and Alarm Parameters . Shift Summary: [id number] 
 
0500: AM labs drawn and sent to lab. Await results. 0630: CHG completed & full linen change done. Daily wt obtained on standing scale. Purewick replaced. Will monitor. 8863Olen Clarity w/ patient's sister Sha Vo, patient's POA, informed of no improvement in kidney function. Would like to be contacted BEFORE cardiac cath procedure done. 0720: Cardiac cath lab team arrived to  patient. This RN called Sha Vo, patient's POA. She would like to speak w/ the MD before they proceed w/ this procedure. 0730: Bedside and Verbal shift change report given to Jenny Keane RN (oncoming nurse) by Tasneem Patel RN (offgoing nurse). Report included the following information SBAR, Kardex, ED Summary, Procedure Summary, Intake/Output, MAR, Recent Results and Cardiac Rhythm NSR. Admission Date 11/14/2018 Admission Diagnosis Acute respiratory failure with hypoxia (HCC) NSTEMI (non-ST elevated myocardial infarction) ([facility] [address] COPD exacerbation ([facility] [address] Consults IP CONSULT TO CARDIOLOGY 
IP CONSULT TO PULMONOLOGY Consults []PT []OT []Speech  
[]Case Management  
  
[] Palliative Cardiac Monitoring Order []Yes []No  
 
IV drips []Yes Drip:                            Dose: 
Drip:                            Dose: 
Drip:                            Dose:  
[]No  
 
GI Prophylaxis []Yes []No  
 
 
 
DVT Prophylaxis SCDs:     
     
 Charles stockings:     
  
[] Medication []Contraindicated []None Activity Level Activity Level: Bed Rest   
 Activity Assistance: Partial (two people) Purposeful Rounding every 1-2 hour? []Yes Mckoy Score  Total Score: 4 Bed Alarm (If score 3 or >) []Yes [] Refused (See signed refusal form in chart) Cyrus Score  Cyrus Score: (P) 18 Cyrus Score (if score 14 or less) []PMT consult  
[]Wound Care consult []Specialty bed  
[] Nutrition consult Needs prior to discharge:  
Home O2 required:   
[]Yes []No  
 If yes, how much O2 required? Other:  
 Last Bowel Movement: Last Bowel Movement Date: 11/15/18 Influenza Vaccine Pneumonia Vaccine Diet Active Orders Diet DIET NPO Past Midnight LDAs Peripheral IV 11/15/18 Right Wrist (Active) Site Assessment Clean, dry, & intact 11/15/2018  7:18 PM  
Phlebitis Assessment 0 11/15/2018  7:18 PM  
Infiltration Assessment 0 11/15/2018  7:18 PM  
Dressing Status Clean, dry, & intact 11/15/2018  7:18 PM  
Dressing Type Tape;Transparent 11/15/2018  7:18 PM  
Hub Color/Line Status Blue; Infusing;Flushed;Patent 11/15/2018  7:18 PM  
Action Taken Open ports on tubing capped 11/15/2018  7:18 PM  
Alcohol Cap Used Yes 11/15/2018  7:18 PM  
   
Peripheral IV 11/16/18 Anterior; Left Antecubital (Active) Site Assessment Clean, dry, & intact 11/16/2018  1:29 AM  
Phlebitis Assessment 0 11/16/2018  1:29 AM  
Infiltration Assessment 0 11/16/2018  1:29 AM  
Dressing Status New;Clean, dry, & intact 11/16/2018  1:29 AM  
Dressing Type Transparent;Tape 11/16/2018  1:29 AM  
Hub Color/Line Status Blue;Flushed;Capped 11/16/2018  1:29 AM  
      
External Female Catheter 11/14/18 (Active) Site Assessment Clean, dry, & intact 11/15/2018  7:18 PM  
Repositioned Yes 11/15/2018  7:18 PM  
Perineal Care Yes 11/15/2018  7:18 PM  
Wick Changed Yes 11/15/2018  7:18 PM  
Suction Canister/Tubing Changed No 11/15/2018  7:18 PM  
Urine Output (mL) 200 ml 11/15/2018 11:37 AM  
            
Urinary Catheter Intake & Output Date 11/15/18 0700 - 11/16/18 0659 11/16/18 0700 - 11/17/18 5892 Shift [id number] 9915-5877 24 Hour Total 7337-6614 5075-1253 24 Hour Total  
INTAKE  
 Shift Total(mL/kg) OUTPUT Urine(mL/kg/hr) 200(0.2)  200 Urine Occurrence(s) 1 x  1 x Urine Output (mL) (External Female Catheter 11/14/18) 200  200 Shift Total(mL/kg) [id number])  [id number]) NET -200  -200 Weight (kg) 101.4 101.4 101.4 101.4 101.4 101.4 Readmission Risk Assessment Tool Score Medium Risk 25 Total Score 3 Has Seen PCP in Last 6 Months (Yes=3, No=0)  
 4 IP Visits Last 12 Months (1-3=4, 4=9, >4=11) 5 Pt. Coverage (Medicare=5 , Medicaid, or Self-Pay=4) 6 Charlson Comorbidity Score (Age + Comorbid Conditions) Criteria that do not apply:  
 . Living with Significant Other. Assisted Living. LTAC. SNF. or  
Rehab Patient Length of Stay (>5 days = 3) Expected Length of Stay 4d 4h Actual Length of Stay 2

## 2018-11-16 NOTE — PROGRESS NOTES
Remains afebrile. Creatinine relatively stable. No significant jump. ? New baseline. Will need nephrology evaluation. Proceed with cath/PCI. Not a good candidate for surgical revascularization.

## 2018-11-16 NOTE — CARDIO/PULMONARY
Cardiopulmonary Rehab Nursing Entry: 
 
Chart reviewed and pt visited to reinforce cardiac teaching. Pt is a 60 yo admitted with NSTEMI, s/p cardiac cath without intervention. Pt has been given the [place] educational written materials. She denies any specific questions. Pt reports that she has made dietary changes since March of last year, she does a daily weight, checks her blood sugar and vital signs at home and recently quit smoking. She walks at home weather dependent. She declined out-pt Cardiac Rehab due to driving distance from [address] [address] [facility], she resides in [place] and does not drive. [facility], to resume home walking routine and use big box stores when inclimate weather. Pt verbalized basic understanding.

## 2018-11-16 NOTE — DIABETES MGMT
DTC Progress Note Recommendations/ Comments: Please consider the followin) beginning humalog 5 units ac tid- hold when NPO 
2) beginning NPH 10 units Q12hrs linked and timed with solu-medrol Current hospital DM medication: lantus 12 units pc dinner and humalog correction resistant scale (pt has received 38 units of correction insulin over the last 24 hours) Also on solumedrol 40 mg every 12 hours. Chart reviewed on Jayne Lizama. Patient is a 59 y.o. female with known Type 2 Diabetes on novolog 5 units ac tid plus correction scale and lantus 12 units daily at home. A1c:  
Lab Results Component Value Date/Time Hemoglobin A1c 7.0 (H) 11/15/2018 04:51 AM  
 Hemoglobin A1c 8.0 (H) 2018 01:36 AM  
 
 
Recent Glucose Results:  
Lab Results Component Value Date/Time  (H) 2018 05:01 AM  
 GLUCPOC 261 (H) 2018 08:35 AM  
 GLUCPOC 257 (H) 2018 07:26 AM  
 GLUCPOC 402 (H) 11/15/2018 11:01 PM  
  
 
Lab Results Component Value Date/Time Creatinine 1.82 (H) 2018 05:01 AM  
 
Estimated Creatinine Clearance: 36.9 mL/min (A) (based on SCr of 1.82 mg/dL (H)). Active Orders Diet DIET DIABETIC CONSISTENT CARB Regular PO intake: No data found. Will continue to follow as needed. Thank you Lynsey Clinton RD, CDE [facility]

## 2018-11-16 NOTE — PROGRESS NOTES
Hospitalist Progress Note NAME: Lisa Caballero :  1960 MRN:  [id number] This patient is at above high risk of deterioration based on documented presenting clinical data, comorbid conditions, high risk of adverse events and current acute care course. Assessment / Plan: 
Acute renal failure Hyponatremia Hyperkalemia 
-s/p cardiac cath this AM without disease 
-minimal dye used 
-creat did not improve with IVF 
-could be chronic disease in the setting of chronic uncontrolled hypertension, diabetes but did arrive on demadex which could have further contributed 
-get renal US, have nephrology see and evaluate 
-work to bring bs down - suspect hyponatremia partially related to pseudohyponatremia 
-K corrected - no changes on telemetry 
-received a x1 dose of calcium with thehyperkalemia noted yesterday Acute hypoxic respiratory failure, POA, suspect multifactorial to include NSTEMI and acute on chronic copd exacerbation possibly early bronchitis NSTEMI Tobacco abuse 
-good FLP profile with decent HDL and LDL, TG 57 
-s/p cath - per verbal report no stents placed and no need for surgical consultation 
-continue nebs if needed but currently with normal lung sounds 
-up oob to moving 
-wean o2 to off if able 
-encourage IS 
-suspect can stop the empiric levaquin but yield to pulmoanry 
-remain on lovenox, asa for now 
-seems to be tolerating BB at this time. -TTE pending 
-counseled cessation of tobacco - she was not interested 
  
Epigastric pain, hx pancreatitis 3/18 LLQ pain N/V earlier, resolved 
-resolved 
-CT abd neg 
  
Acute encephalopathy, POA Acidosis, mixed Lethargic 
-abg improved - slightly hypercapnic but pH improved 
-encourage up and moving if able 
-confusion resolved 
  
IDDM 2 uncontrolled with renal insufficiency 
-suspect elevated bs playing a role in low sodium 
-correct bs, encourage her back on home regimen with dietary adjustment 
-ssi continues 
  
Hypothyroid -continue levothyroxine 
  
Morbid obesity B/l venous stasis Body mass index is 42.51 kg/m². -encourage up oob and moving when able and confusion abates 
  
Code:  Presumed full code since patient not reliable, not clear she has capacity at this time with her lethargy. Says she would not want intubation but was full code 3/18. No advance directive on file. DVT prophylaxis: lovenox Surrogate decision maker:  Sister Darcy Mckeon Medical Decision Making Today · Acute or chronic illness that poses a threat to life or bodily function · I have reviewed the flowsheet and previous days notes · One or more chronic illnesses with severe exacerbation, progression or side effects of treatment · Review and order of Clinical lab tests · Discuss case with Specialist MD 
 
Subjective: Chief Complaint / Reason for Physician Visit \"i feel fine\" seen in room post cath. Discussed with RN events overnight. Sitting up feeding self. following commands. No pains. Less sob. +cough. No n/v 
 
Review of Systems: 
Symptom Y/N Comments  Symptom Y/N Comments Fever/Chills n   Chest Pain n   
Poor Appetite y   Edema Cough y   Abdominal Pain n   
Sputum n   Joint Pain n   
SOB/RAMIREZ y   Pruritis/Rash n   
Nausea/vomit    Tolerating PT/OT Diarrhea n   Tolerating Diet y Constipation n   Other Could NOT obtain due to:   
 
Objective: VITALS:  
Last 24hrs VS reviewed since prior progress note. Most recent are: 
Patient Vitals for the past 24 hrs: 
 Temp Pulse Resp BP SpO2  
11/16/18 0845  61 15 145/81 97 % 11/16/18 0843  60 21 147/84 98 % 11/16/18 0840  65 20  100 % 11/16/18 0830 97.6 °F (36.4 °C) 64 18 128/70 90 % 11/16/18 0735 97.8 °F (36.6 °C)      
11/16/18 0322 97.9 °F (36.6 °C) 77 18 131/60 95 % 11/15/18 2336 98.4 °F (36.9 °C) 90 16 127/63 95 % 11/15/18 1918 98.4 °F (36.9 °C) 82 18 132/57 97 % 11/15/18 1811 97.8 °F (36.6 °C) 86 18 136/61 94 % 11/15/18 1503     95 % 11/15/18 1454  66   97 % 11/15/18 1453 97.5 °F (36.4 °C) 72 16 118/49 97 % 11/15/18 1125     94 % 11/15/18 1100 97.6 °F (36.4 °C) 63 10 122/52 90 % 11/15/18 1059 98.6 °F (37 °C) 63 20 122/52 91 % Intake/Output Summary (Last 24 hours) at 11/16/2018 0945 Last data filed at 11/16/2018 9187 Gross per 24 hour Intake 2693.33 ml Output 400 ml Net 2293.33 ml PHYSICAL EXAM: 
General: WD, obese f sitting up in bed feeding self, Alert, cooperative, no acute distress   
EENT:  EOMI. Anicteric sclerae. MM m Resp:  cta b, no wheezing, No accessory muscle use CV:  Regular  rhythm,  No edema GI:  Soft, Non distended, Non tender.  +Bowel sounds Neurologic:  Alert and oriented X 3, normal speech Psych:   Fair  insight. Not anxious nor agitated Skin:  no rashes or ulcers. No jaundice Reviewed most current lab test results and cultures  YES Reviewed most current radiology test results   YES Review and summation of old records today    NO Reviewed patient's current orders and MAR    YES 
PMH/SH reviewed - no change compared to H&P 
________________________________________________________________________ Care Plan discussed with: 
  Comments Patient x Discussed with patient in room. POC discussed. Questions answered (20 Family RN Care Manager Consultant: x Cardiology 5 Multidiciplinary team rounds were held today with , nursing, pharmacist and clinical coordinator. Patient's plan of care was discussed; medications were reviewed and discharge planning was addressed. ________________________________________________________________________ Total NON critical care TIME:  35   Minutes Total CRITICAL CARE TIME Spent:   Minutes non procedure based. I have provided critical care time. During this entire length of time I was immediately available to the patient.  The reason for providing this level of medical care was due to a critical illness that impaired one or more vital organ systems, such that there was a high probability of imminent or life threatening deterioration in the patient's condition. This care involved high complexity decision making which includes reviewing the patient's past medical records, current laboratory results, and actual Xray films in order to assess, support vital system function, and to treat this degree of vital organ system failure, and to prevent further life threatening deterioration of the patients condition. Comments >50% of visit spent in counseling and coordination of care x See above  
________________________________________________________________________ Procedures: see electronic medical records for all procedures/Xrays and details which were not copied into this note but were reviewed prior to creation of Plan. LABS: 
Recent Labs 11/16/18 
0501 11/15/18 
0451 11/14/18 
4732 WBC 11.8* 9.1 10.7 HGB 11.3* 12.0 12.8 HCT 36.9 39.8 43.3  262 218 Recent Labs 11/16/18 
0501 11/15/18 
0451 11/14/18 
8538 * 131* 140  
K 4.4 5.4* 4.7 CL 97 95* 98  
CO2 29 29 31 BUN 58* 51* 33* CREA 1.82* 1.77* 1.49* * 271* 205* CA 8.6 8.2* 9.0 MG 2.5*  --  1.7 PHOS 3.1  --   --   
 
Recent Labs 11/15/18 
0451 11/14/18 
3245 11/14/18 
1425 SGOT 13*  --  28 ALT 17  --  20  
  --  136* TBILI 0.8  --  0.7 TP 6.7  --  6.6 ALB 2.8*  --  3.2*  
GLOB 3.9  --  3.4 LPSE  --  48* 59* Recent Labs 11/14/18 
9478 INR 1.0 PTP 10.1 APTT 26.4 No results for input(s): FE, TIBC, PSAT, FERR in the last 72 hours. No results found for: FOL, RBCF Recent Labs 11/15/18 
0400 11/14/18 
1700 PH 7.31* 7.21* PCO2 60* 59* PO2 94 59* No results for input(s): PHI, PO2I, PCO2I in the last 72 hours. Recent Labs 11/14/18 
4682 11/14/18 
0530 11/14/18 
0390   --   --   
 CKNDX 5.2*  --   --   
TROIQ 1.61* 2.28* 2.59* Lab Results Component Value Date/Time Cholesterol, total 138 11/15/2018 04:51 AM  
 HDL Cholesterol 58 11/15/2018 04:51 AM  
 LDL, calculated 68.6 11/15/2018 04:51 AM  
 Triglyceride 57 11/15/2018 04:51 AM  
 CHOL/HDL Ratio 2.4 11/15/2018 04:51 AM  
 
Lab Results Component Value Date/Time Glucose (POC) 261 (H) 11/16/2018 08:35 AM  
 Glucose (POC) 257 (H) 11/16/2018 07:26 AM  
 Glucose (POC) 402 (H) 11/15/2018 11:01 PM  
 Glucose (POC) 330 (H) 11/15/2018 04:42 PM  
 Glucose (POC) 201 (H) 11/15/2018 11:44 AM  
 
Lab Results Component Value Date/Time Color YELLOW/STRAW 11/14/2018 03:57 AM  
 Appearance CLEAR 11/14/2018 03:57 AM  
 Specific gravity 1.020 11/14/2018 03:57 AM  
 Specific gravity 1.016 03/16/2018 05:31 PM  
 pH (UA) 5.5 11/14/2018 03:57 AM  
 Protein 100 (A) 11/14/2018 03:57 AM  
 Glucose NEGATIVE  11/14/2018 03:57 AM  
 Ketone 80 (A) 11/14/2018 03:57 AM  
 Bilirubin NEGATIVE  11/14/2018 03:57 AM  
 Urobilinogen 0.2 11/14/2018 03:57 AM  
 Nitrites NEGATIVE  11/14/2018 03:57 AM  
 Leukocyte Esterase NEGATIVE  11/14/2018 03:57 AM  
 Epithelial cells FEW 11/14/2018 03:57 AM  
 Bacteria NEGATIVE  11/14/2018 03:57 AM  
 WBC 0-4 11/14/2018 03:57 AM  
 RBC 0-5 11/14/2018 03:57 AM  
 
 
RADIOGRAPHIC STUDIES: 
CXR Results  (Last 48 hours) None CT Results  (Last 48 hours)  
          
 11/14/18 1158  CT ABD PELV WO CONT Final result Impression:  IMPRESSION:  
Chest  
1. Trace right pleural effusion. 2.  No evidence of pulmonary edema or pneumonia. Abdomen: 1. No evidence of an acute intra-abdominal process. 2.  Small bowel containing right inguinal hernia without evidence of  
obstruction. Narrative:  EXAM:  CT CHEST WO CONT, CT ABD PELV WO CONT INDICATION:   dyspnea, abd pain, b/l crackles, hypoxic, nstemi, eval for  
pneumonia or pulmonary edema COMPARISON: Abdominal ultrasound March 2018. Brii Velasquez TECHNIQUE:   
Multislice helical CT was performed from the thoracic inlet to the pubic  
symphysis was performed without  intravenous contrast administration. Oral  
contrast was not administered. Contiguous 5 mm axial images were reconstructed  
and lung and soft tissue windows were generated. Coronal and sagittal  
reformations were generated. CT dose reduction was achieved through the use of a standardized protocol  
tailored for this examination and automatic exposure control for dose  
modulation. Ramon Inch FINDINGS:  
CHEST:  
Thyroid: Unremarkable. Ramon Inch Mediastinum/kaelyn: No pathologically enlarged mediastinal or hilar lymph nodes. Ramon Inch Heart/vessels: Mild cardiomegaly. No pericardial effusion. Mild coronary artery  
calcifications. Ramon Inch Lungs/Pleura: Hypoventilatory exam with respiratory artifact worst at the lung  
bases, limiting detailed evaluation. There is a trace right pleural effusion. There is a fat-containing right Bochdalek hernia. No evidence of infectious  
process. No pulmonary edema. No suspicious nodules or masses. No pneumothorax. Ramon Inch ABDOMEN:  
Liver: Unremarkable. No focal abnormality. Ramon Inch Gallbladder/Biliary: No biliary dilation. Sludge in the gallbladder. Ramon Inch Spleen: Unremarkable. Ramon Inch Pancreas: Unremarkable. Ramon Inch Adrenals: Unremarkable. Ramon Inch Kidneys: Symmetrically small. These measure approximately 8 cm in length. No  
contour deforming mass, hydronephrosis, or nephrolithiasis. Ramon Inch Ureters: No evidence of abnormality along the course of the ureters. Ramon Inch Bladder: Unremarkable. Ramon Inch Gastrointestinal tract: Small bowel containing right inguinal hernia. No  
evidence of obstruction or focal inflammation. Trace fluid in the right inguinal  
hernia sac. Ramon Inch Peritoneum: Trace fluid in the right inguinal hernia sac. Otherwise no evidence  
of ascites. No pneumoperitoneum. Ramon Inch Retroperitoneum: Mild atherosclerotic disease of the aorta and its branches. No  
aneurysmal dilation. Reproductive System: Unremarkable. Mordecai Hays Bones and soft tissues: No acute or aggressive osseous lesion. Subcutaneous  
tissues are unremarkable. .  
   
   
  
 11/14/18 1158  CT CHEST WO CONT Final result Impression:  IMPRESSION:  
Chest  
1. Trace right pleural effusion. 2.  No evidence of pulmonary edema or pneumonia. Abdomen: 1. No evidence of an acute intra-abdominal process. 2.  Small bowel containing right inguinal hernia without evidence of  
obstruction. Narrative:  EXAM:  CT CHEST WO CONT, CT ABD PELV WO CONT INDICATION:   dyspnea, abd pain, b/l crackles, hypoxic, nstemi, eval for  
pneumonia or pulmonary edema COMPARISON: Abdominal ultrasound March 2018. Mordecai Hays TECHNIQUE:   
Multislice helical CT was performed from the thoracic inlet to the pubic  
symphysis was performed without  intravenous contrast administration. Oral  
contrast was not administered. Contiguous 5 mm axial images were reconstructed  
and lung and soft tissue windows were generated. Coronal and sagittal  
reformations were generated. CT dose reduction was achieved through the use of a standardized protocol  
tailored for this examination and automatic exposure control for dose  
modulation. Mordecai Hays FINDINGS:  
CHEST:  
Thyroid: Unremarkable. Mordecai Hays Mediastinum/kaelyn: No pathologically enlarged mediastinal or hilar lymph nodes. Mordecai Hays Heart/vessels: Mild cardiomegaly. No pericardial effusion. Mild coronary artery  
calcifications. Mordecai Hays Lungs/Pleura: Hypoventilatory exam with respiratory artifact worst at the lung  
bases, limiting detailed evaluation. There is a trace right pleural effusion. There is a fat-containing right Bochdalek hernia. No evidence of infectious  
process. No pulmonary edema. No suspicious nodules or masses. No pneumothorax. Mordecai Hays ABDOMEN:  
Liver: Unremarkable. No focal abnormality. Mordecai Hays Gallbladder/Biliary: No biliary dilation. Sludge in the gallbladder. Mordecai Hays Spleen: Unremarkable. Mordecai Hays Pancreas: Unremarkable. Isak Jaimes Adrenals: Unremarkable. Isak Moestein Kidneys: Symmetrically small. These measure approximately 8 cm in length. No  
contour deforming mass, hydronephrosis, or nephrolithiasis. Traverse Jaimes Ureters: No evidence of abnormality along the course of the ureters. Isak Meostein Bladder: Unremarkable. Isak Moestein Gastrointestinal tract: Small bowel containing right inguinal hernia. No  
evidence of obstruction or focal inflammation. Trace fluid in the right inguinal  
hernia sac. Isak Jaimes Peritoneum: Trace fluid in the right inguinal hernia sac. Otherwise no evidence  
of ascites. No pneumoperitoneum. Isak Jaimes Retroperitoneum: Mild atherosclerotic disease of the aorta and its branches. No  
aneurysmal dilation. Reproductive System: Unremarkable. Isak Jaimes Bones and soft tissues: No acute or aggressive osseous lesion. Subcutaneous  
tissues are unremarkable. Isak Moestein Echo Results  (Last 48 hours) None VENOUS DOPPLER results  (Last 48 hours) None CULTURES: 
 
No results found for: SDES Lab Results Component Value Date/Time Culture result: NO GROWTH 1 DAY 11/14/2018 04:10 AM  
 Culture result: NO GROWTH 1 DAY 11/14/2018 04:05 AM  
 Culture result: NO GROWTH 6 DAYS 01/24/2018 06:46 AM  
  
 
 
Signed: Cooper Meneses MD 
 
This note will not be viewable in [address]

## 2018-11-16 NOTE — CONSULTS
Sarah العراقي  MR#: [id number]  : 1960  ACCOUNT #: [de-identified]   DATE OF SERVICE: 2018    REFERRING PHYSICIAN:  Ruel Tadeo MD    REASON FOR CONSULTATION:  Acute kidney injury. HISTORY OF PRESENT ILLNESS:  The patient is a 40-year-old white female with past medical history significant for diabetes and hypertension, who is admitted for non-ST elevation MI and respiratory failure. Patient is found to have renal failure. Her creatinine level has increased to 1.82 with BUN of 58 today. On admission, her creatinine was 1.5 with sodium of 140. The patient had a creatinine level of 1.27 in 2018. Prior to that, in 2018, her creatinine level was 1.4. In 2017, her creatinine level was 1.45. Patient has been taking Aleve for joint pain along with Tylenol. She has been on torsemide, which is chronic. No new changes in the torsemide. The patient did have an episode of vomiting prior to presentation. She also has hyperglycemia. Her blood sugars have been in the 300-400 range. She is also diagnosed with a COPD exacerbation and she is on IV steroids. The patient has undergone cardiac catheterization today. No coronary stenting required. No documented hypotension. She is not on any ACE inhibitors or ARB. A renal ultrasound from March showed left kidney 7.4 cm and right kidney 9.1 cm size. PAST MEDICAL HISTORY:  1.  CKD stage III. 2.  Diabetes. 3.  Morbid obesity. 4.  Hypertension. 5.  Hypothyroidism. 6.  Hyperlipidemia. 7.  Gastroesophageal reflux disease. 8.  Arthritis. 9.  NSAID use. 10.  Venous insufficiency. 11.  Partial toe amputation. 12.  Colonoscopy. SOCIAL HISTORY:  Lives alone. Current smoker. No alcohol abuse. FAMILY HISTORY:  Negative for any end-stage renal disease. MEDICATIONS PRIOR TO ADMISSION:  KCl, Demadex, zinc, levothyroxine, Combivent, Neurontin, Lantus, aspirin.     REVIEW OF SYSTEMS:  As per HPI. A total of 11 systems were reviewed and essentially negative. Her shortness of breath has improved. She denies any chest pain. She does complain of leg edema. PHYSICAL EXAMINATION:  VITAL SIGNS:  Temperature 97.6, pulse 59, blood pressure 130/80, respiration rate 18 per minute. GENERAL:  Patient is lying in the bed, comfortable, not in apparent distress. Alert, awake, oriented x3. NECK:  Supple. No palpable neck mass. LUNGS:  Clear to auscultation bilaterally. No wheezing or crackles. CARDIAC:  S1, S2.  Regular rate and rhythm. ABDOMEN:  Obese, soft, nontender. Bowel sounds present. EXTREMITIES:  +3 lower extremity edema present. No rash. SKIN:  No rash. JOINTS:  No joint effusion or tenderness. BACK:  No spine tenderness. NEUROLOGIC:  Nonfocal.  Normal speech. DIAGNOSTIC DATA:  Hemoglobin 11.3:  White cell count 11.8.  UA positive for protein, positive for blood, negative for nitrites, negative for leukocyte esterase. Sodium 131, potassium 4.4, glucose 295, creatinine 1.82, BUN 58. CHEST X-RAY:  No pulmonary edema, no consolidation. ELECTROCARDIOGRAM:  Normal sinus rhythm, right bundle branch block, T-wave inferolateral ischemia. IMPRESSION:  1. Acute kidney injury, multifactorial, could be due to progression of chronic kidney disease or cardiorenal syndrome. Postrenal obstruction less likely. Vasculitis is less likely. Prerenal factors are possible, although her creatinine got worse after admission. She may have a progression of her renal atrophy, which could explain to her rise in her creatinine. 2.  Chronic kidney disease stage III, baseline creatinine 1.3-1.5. 3.  Proteinuria. 4.  Non-ST elevation myocardial infarction, status post cardiac catheterization, 11/16/2018.  5.  Chronic obstructive pulmonary disease exacerbation. 6.  Current smoker. 7.  Diabetes type 2.  8.  Hyponatremia, likely pseudohyponatremia due to hyperglycemia.   9.  History of edema. 10.  History of atrophic right kidney. PLAN:  1. Continue IV fluids until tomorrow morning. 2.  Hold Demadex today. 3.  Check workup for serology, check JULIAN, ANCA, serum protein electrophoresis, complement level. 4.  Check urine electrolytes. 5.  Check renal ultrasound. 6.  Check postvoid bladder scan. 7.  Labs in the morning. Her high BUN is likely due to steroids. Treatment plan discussed with the patient. Thanks for the consultation. We will follow the patient with you.       MD VIRA Willett / TAYLOR  D: 11/16/2018 11:32     T: 11/16/2018 11:55  JOB #: [id number]  CC: Manuel Miranda MD

## 2018-11-16 NOTE — PROGRESS NOTES
Problem: Pressure Injury - Risk of 
Goal: *Prevention of pressure injury Document Cyrus Scale and appropriate interventions in the flowsheet. Outcome: Progressing Towards Goal 
Pressure Injury Interventions: 
Sensory Interventions: Assess changes in LOC, Assess need for specialty bed, Check visual cues for pain, Discuss PT/OT consult with provider, Float heels, Keep linens dry and wrinkle-free, Maintain/enhance activity level, Minimize linen layers, Monitor skin under medical devices, Pressure redistribution bed/mattress (bed type), Turn and reposition approx. every two hours (pillows and wedges if needed) Moisture Interventions: Absorbent underpads, Check for incontinence Q2 hours and as needed, Internal/External urinary devices, Limit adult briefs Activity Interventions: Increase time out of bed, Pressure redistribution bed/mattress(bed type), PT/OT evaluation Mobility Interventions: Float heels, HOB 30 degrees or less, Pressure redistribution bed/mattress (bed type), PT/OT evaluation, Turn and reposition approx. every two hours(pillow and wedges) Nutrition Interventions: Document food/fluid/supplement intake, Discuss nutritional consult with provider, Offer support with meals,snacks and hydration Friction and Shear Interventions: HOB 30 degrees or less, Lift sheet, Lift team/patient mobility team

## 2018-11-16 NOTE — PROGRESS NOTES
[address] [place] [address], [address] [place] [address], [address] [facility]  [phone] Cardiology Progress Note 11/16/2018 1:52 PM 
 
Admit Date: 11/14/2018 Admit Diagnosis:  
Acute respiratory failure with hypoxia ([facility] [address] NSTEMI (non-ST elevated myocardial infarction) ([facility] [address] [facility] COPD exacerbation ([facility] [address] Subjective:  
 
Marlo Montano is drowsy, but states that she is doing ok. Complaining of a sore throat that she states she has had for 3 days now. Also states that her legs are bothering her. Appears that wound care did see patient and recommended no compression stockings and leaving scabs ASHELY. Elevate legs to decrease edema. Family not at bedside at this time. Denies chest pain and shortness of breath. States that she is coughing d/t sore throat. Visit Vitals /67 Pulse 71 Temp 98.1 °F (36.7 °C) Resp 22 Ht 5' 1\" (1.549 m) Wt 229 lb 4.5 oz (104 kg) SpO2 92% BMI 43.32 kg/m² Current Facility-Administered Medications Medication Dose Route Frequency  albuterol-ipratropium (DUO-NEB) 2.5 MG-0.5 MG/3 ML  3 mL Nebulization QID RT  
 zinc oxide-cod liver oil (DESITIN) 40 % paste   Topical PRN  
 0.9% sodium chloride infusion  50 mL/hr IntraVENous CONTINUOUS  
 sodium chloride (NS) flush 5-10 mL  5-10 mL IntraVENous Q8H  
 sodium chloride (NS) flush 5-10 mL  5-10 mL IntraVENous PRN  
 acetaminophen (TYLENOL) tablet 650 mg  650 mg Oral Q6H PRN  
 ondansetron (ZOFRAN) injection 4 mg  4 mg IntraVENous Q6H PRN  
 docusate sodium (COLACE) capsule 100 mg  100 mg Oral BID  aspirin delayed-release tablet 81 mg  81 mg Oral DAILY  insulin lispro (HUMALOG) injection   SubCUTAneous AC&HS  
 glucose chewable tablet 16 g  4 Tab Oral PRN  
 dextrose (D50W) injection syrg 12.5-25 g  12.5-25 g IntraVENous PRN  
 glucagon (GLUCAGEN) injection 1 mg  1 mg IntraMUSCular PRN  
 albuterol-ipratropium (DUO-NEB) 2.5 MG-0.5 MG/3 ML  3 mL Nebulization Q6H PRN  
  levothyroxine (SYNTHROID) tablet 150 mcg  150 mcg Oral ACB  insulin glargine (LANTUS) injection 12 Units  12 Units SubCUTAneous PCD  gabapentin (NEURONTIN) capsule 600 mg  600 mg Oral QHS  metoprolol tartrate (LOPRESSOR) tablet 12.5 mg  12.5 mg Oral Q12H  
 atorvastatin (LIPITOR) tablet 20 mg  20 mg Oral QHS  methylPREDNISolone (PF) (SOLU-MEDROL) injection 40 mg  40 mg IntraVENous Q12H Objective:  
  
Physical Exam: 
General Appearance:  Obese  female in no acute distress. Alert and oriented x3. Flat affect. Appears older than stated age. Chest:   Clear, diminished in bilateral bases Cardiovascular:  Regular rate and rhythm, no murmur.  
Abdomen:   Soft, non-tender, bowel sounds are active.  
Extremities: Nonpitting (tight) edema to BLE. Erythema noted to BLE. H/o venous stasis. Skin:    Warm and dry.  
 
Data Review:  
Recent Labs 11/16/18 
0501 11/15/18 
0451 11/14/18 
2692 WBC 11.8* 9.1 10.7 HGB 11.3* 12.0 12.8 HCT 36.9 39.8 43.3  262 218 Recent Labs 11/16/18 
0501 11/15/18 
0451 11/14/18 
2797 * 131* 140  
K 4.4 5.4* 4.7 CL 97 95* 98  
CO2 29 29 31 * 271* 205* BUN 58* 51* 33* CREA 1.82* 1.77* 1.49* CA 8.6 8.2* 9.0 MG 2.5*  --  1.7 PHOS 3.1  --   --   
ALB  --  2.8* 3.2* TBILI  --  0.8 0.7 SGOT  --  13* 28 ALT  --  17 20 INR  --   --  1.0 Recent Labs 11/14/18 
7755 11/14/18 
0530 11/14/18 
7880 TROIQ 1.61* 2.28* 2.59*   --   --   
CKMB 6.4*  --   -- Intake/Output Summary (Last 24 hours) at 11/16/2018 1352 Last data filed at 11/16/2018 4307 Gross per 24 hour Intake 2693.33 ml Output 200 ml Net 2493.33 ml Telemetry: Sinus rhythm Cardiac cath: 11/16/18 CARDIAC STRUCTURES: 
Global left ventricular function was normal. EF calculated by contrast 
ventriculography was 55 %. CORONARY CIRCULATION: 
Proximal RCA: There was a 20 % stenosis. ECHO: 11/14/18 Left ventricle: Systolic function was normal. Ejection fraction was 
estimated in the range of 55 % to 60 %. No obvious wall motion 
abnormalities identified in the views obtained. Right ventricle: The ventricle was mildly dilated. Left atrium: The atrium was mildly dilated. Right atrium: The atrium was mildly dilated. Inferior vena cava, hepatic veins: The inferior vena cava was dilated. Assessment:  
 
Active Problems: COPD exacerbation ([facility] [address] (11/14/2018) NSTEMI (non-ST elevated myocardial infarction) ([facility] [address] (11/14/2018) Acute respiratory failure with hypoxia ([facility] [address] (11/14/2018) Plan:  
 
NSTEMI: 
Cardiac cath showed no obstructive disease from today 11/16/18. Continue to rehydrate patient post-cath. Monitor creatinine. ECHO shows EF 55-60% with mildly dilated RA, LA, RV and IVC. Continue ASA, statin, and BB. Continue with medical management. Continue to treat and monitor blood glucose. Blood glucose still in 200s today. Agree with NP student assessment and plan Paulino Brewster [facility] 11/16/2018 Patient seen, examined by me personally. Plan discussed as detailed. Agree with note as outlined by  NP. I confirm findings in history and physical exam. No additional findings noted. Agree with plan as outlined above. Probably has type II MI. Continue ASA, betablocker. Appreciate nephrology input. Continue hydration. Discussed with sister.  
 
Trice Degroot MD

## 2018-11-17 ENCOUNTER — APPOINTMENT (OUTPATIENT)
Dept: CT IMAGING | Age: 58
DRG: 280 | End: 2018-11-17
Attending: INTERNAL MEDICINE
Payer: MEDICARE

## 2018-11-17 LAB
ALBUMIN SERPL-MCNC: 3 G/DL (ref 3.5–5)
ANION GAP SERPL CALC-SCNC: 7 MMOL/L (ref 5–15)
BASOPHILS # BLD: 0 K/UL (ref 0–0.1)
BASOPHILS NFR BLD: 0 % (ref 0–1)
BUN SERPL-MCNC: 49 MG/DL (ref 6–20)
BUN/CREAT SERPL: 38 (ref 12–20)
CALCIUM SERPL-MCNC: 8.7 MG/DL (ref 8.5–10.1)
CHLORIDE SERPL-SCNC: 97 MMOL/L (ref 97–108)
CO2 SERPL-SCNC: 27 MMOL/L (ref 21–32)
CREAT SERPL-MCNC: 1.28 MG/DL (ref 0.55–1.02)
DIFFERENTIAL METHOD BLD: ABNORMAL
EOSINOPHIL # BLD: 0 K/UL (ref 0–0.4)
EOSINOPHIL #/AREA URNS HPF: NEGATIVE /[HPF]
EOSINOPHIL NFR BLD: 0 % (ref 0–7)
ERYTHROCYTE [DISTWIDTH] IN BLOOD BY AUTOMATED COUNT: 17.3 % (ref 11.5–14.5)
GLUCOSE BLD STRIP.AUTO-MCNC: 119 MG/DL (ref 65–100)
GLUCOSE BLD STRIP.AUTO-MCNC: 166 MG/DL (ref 65–100)
GLUCOSE BLD STRIP.AUTO-MCNC: 396 MG/DL (ref 65–100)
GLUCOSE BLD STRIP.AUTO-MCNC: 418 MG/DL (ref 65–100)
GLUCOSE SERPL-MCNC: 246 MG/DL (ref 65–100)
HAV IGM SER QL: NONREACTIVE
HBV CORE IGM SER QL: NONREACTIVE
HBV CORE IGM SER QL: NONREACTIVE
HBV SURFACE AG SER QL: <0.1 INDEX
HBV SURFACE AG SER QL: NEGATIVE
HCT VFR BLD AUTO: 39.2 % (ref 35–47)
HCV AB SERPL QL IA: NONREACTIVE
HCV COMMENT,HCGAC: NORMAL
HGB BLD-MCNC: 12 G/DL (ref 11.5–16)
IMM GRANULOCYTES # BLD: 0.1 K/UL (ref 0–0.04)
IMM GRANULOCYTES NFR BLD AUTO: 1 % (ref 0–0.5)
LYMPHOCYTES # BLD: 0.5 K/UL (ref 0.8–3.5)
LYMPHOCYTES NFR BLD: 7 % (ref 12–49)
MAGNESIUM SERPL-MCNC: 2.4 MG/DL (ref 1.6–2.4)
MCH RBC QN AUTO: 24.2 PG (ref 26–34)
MCHC RBC AUTO-ENTMCNC: 30.6 G/DL (ref 30–36.5)
MCV RBC AUTO: 79 FL (ref 80–99)
MONOCYTES # BLD: 0.3 K/UL (ref 0–1)
MONOCYTES NFR BLD: 4 % (ref 5–13)
NEUTS SEG # BLD: 6.4 K/UL (ref 1.8–8)
NEUTS SEG NFR BLD: 88 % (ref 32–75)
NRBC # BLD: 0 K/UL (ref 0–0.01)
NRBC BLD-RTO: 0 PER 100 WBC
PHOSPHATE SERPL-MCNC: 2.5 MG/DL (ref 2.6–4.7)
PLATELET # BLD AUTO: 242 K/UL (ref 150–400)
PMV BLD AUTO: 11 FL (ref 8.9–12.9)
POTASSIUM SERPL-SCNC: 5.2 MMOL/L (ref 3.5–5.1)
RBC # BLD AUTO: 4.96 M/UL (ref 3.8–5.2)
RBC MORPH BLD: ABNORMAL
RBC MORPH BLD: ABNORMAL
SERVICE CMNT-IMP: ABNORMAL
SODIUM SERPL-SCNC: 131 MMOL/L (ref 136–145)
SP1: NORMAL
SP2: NORMAL
SP3: NORMAL
WBC # BLD AUTO: 7.3 K/UL (ref 3.6–11)

## 2018-11-17 PROCEDURE — 86160 COMPLEMENT ANTIGEN: CPT

## 2018-11-17 PROCEDURE — 83735 ASSAY OF MAGNESIUM: CPT

## 2018-11-17 PROCEDURE — 74011250637 HC RX REV CODE- 250/637: Performed by: INTERNAL MEDICINE

## 2018-11-17 PROCEDURE — 77030011256 HC DRSG MEPILEX <16IN NO BORD MOLN -A

## 2018-11-17 PROCEDURE — 65660000000 HC RM CCU STEPDOWN

## 2018-11-17 PROCEDURE — 74011250636 HC RX REV CODE- 250/636: Performed by: INTERNAL MEDICINE

## 2018-11-17 PROCEDURE — 74011250637 HC RX REV CODE- 250/637: Performed by: HOSPITALIST

## 2018-11-17 PROCEDURE — 80069 RENAL FUNCTION PANEL: CPT

## 2018-11-17 PROCEDURE — 82962 GLUCOSE BLOOD TEST: CPT

## 2018-11-17 PROCEDURE — 74011250636 HC RX REV CODE- 250/636: Performed by: HOSPITALIST

## 2018-11-17 PROCEDURE — 74011636637 HC RX REV CODE- 636/637: Performed by: HOSPITALIST

## 2018-11-17 PROCEDURE — 85025 COMPLETE CBC W/AUTO DIFF WBC: CPT

## 2018-11-17 PROCEDURE — 80074 ACUTE HEPATITIS PANEL: CPT

## 2018-11-17 PROCEDURE — 74011636637 HC RX REV CODE- 636/637: Performed by: INTERNAL MEDICINE

## 2018-11-17 PROCEDURE — 82784 ASSAY IGA/IGD/IGG/IGM EACH: CPT

## 2018-11-17 PROCEDURE — 82595 ASSAY OF CRYOGLOBULIN: CPT

## 2018-11-17 PROCEDURE — 86038 ANTINUCLEAR ANTIBODIES: CPT

## 2018-11-17 PROCEDURE — 83520 IMMUNOASSAY QUANT NOS NONAB: CPT

## 2018-11-17 PROCEDURE — 36415 COLL VENOUS BLD VENIPUNCTURE: CPT

## 2018-11-17 PROCEDURE — 71275 CT ANGIOGRAPHY CHEST: CPT

## 2018-11-17 PROCEDURE — 74011250637 HC RX REV CODE- 250/637: Performed by: NURSE PRACTITIONER

## 2018-11-17 PROCEDURE — 74011636320 HC RX REV CODE- 636/320: Performed by: INTERNAL MEDICINE

## 2018-11-17 PROCEDURE — 86705 HEP B CORE ANTIBODY IGM: CPT

## 2018-11-17 RX ORDER — INSULIN LISPRO 100 [IU]/ML
18 INJECTION, SOLUTION INTRAVENOUS; SUBCUTANEOUS ONCE
Status: COMPLETED | OUTPATIENT
Start: 2018-11-17 | End: 2018-11-17

## 2018-11-17 RX ORDER — PREDNISONE 20 MG/1
20 TABLET ORAL
Status: DISCONTINUED | OUTPATIENT
Start: 2018-11-17 | End: 2018-11-18 | Stop reason: HOSPADM

## 2018-11-17 RX ORDER — SODIUM CHLORIDE 0.9 % (FLUSH) 0.9 %
10 SYRINGE (ML) INJECTION
Status: COMPLETED | OUTPATIENT
Start: 2018-11-17 | End: 2018-11-17

## 2018-11-17 RX ORDER — SODIUM CHLORIDE 9 MG/ML
50 INJECTION, SOLUTION INTRAVENOUS
Status: COMPLETED | OUTPATIENT
Start: 2018-11-17 | End: 2018-11-17

## 2018-11-17 RX ORDER — SODIUM POLYSTYRENE SULFONATE 15 G/60ML
45 SUSPENSION ORAL; RECTAL
Status: COMPLETED | OUTPATIENT
Start: 2018-11-17 | End: 2018-11-17

## 2018-11-17 RX ADMIN — GABAPENTIN 600 MG: 300 CAPSULE ORAL at 21:20

## 2018-11-17 RX ADMIN — Medication 10 ML: at 13:57

## 2018-11-17 RX ADMIN — ATORVASTATIN CALCIUM 20 MG: 20 TABLET, FILM COATED ORAL at 21:20

## 2018-11-17 RX ADMIN — Medication 1 LOZENGE: at 09:05

## 2018-11-17 RX ADMIN — IOPAMIDOL 100 ML: 755 INJECTION, SOLUTION INTRAVENOUS at 13:57

## 2018-11-17 RX ADMIN — Medication 1 LOZENGE: at 05:38

## 2018-11-17 RX ADMIN — INSULIN LISPRO 3 UNITS: 100 INJECTION, SOLUTION INTRAVENOUS; SUBCUTANEOUS at 17:40

## 2018-11-17 RX ADMIN — ASPIRIN 81 MG: 81 TABLET, COATED ORAL at 09:04

## 2018-11-17 RX ADMIN — Medication 10 ML: at 21:25

## 2018-11-17 RX ADMIN — Medication 10 ML: at 13:10

## 2018-11-17 RX ADMIN — SODIUM CHLORIDE 50 ML/HR: 900 INJECTION, SOLUTION INTRAVENOUS at 13:57

## 2018-11-17 RX ADMIN — INSULIN GLARGINE 12 UNITS: 100 INJECTION, SOLUTION SUBCUTANEOUS at 17:40

## 2018-11-17 RX ADMIN — METHYLPREDNISOLONE SODIUM SUCCINATE 40 MG: 40 INJECTION, POWDER, FOR SOLUTION INTRAMUSCULAR; INTRAVENOUS at 09:04

## 2018-11-17 RX ADMIN — Medication 10 ML: at 05:38

## 2018-11-17 RX ADMIN — LEVOTHYROXINE SODIUM 150 MCG: 150 TABLET ORAL at 10:23

## 2018-11-17 RX ADMIN — INSULIN LISPRO 13 UNITS: 100 INJECTION, SOLUTION INTRAVENOUS; SUBCUTANEOUS at 09:09

## 2018-11-17 RX ADMIN — INSULIN LISPRO 18 UNITS: 100 INJECTION, SOLUTION INTRAVENOUS; SUBCUTANEOUS at 13:08

## 2018-11-17 RX ADMIN — SODIUM POLYSTYRENE SULFONATE 45 G: 15 SUSPENSION ORAL; RECTAL at 10:24

## 2018-11-17 RX ADMIN — METOPROLOL TARTRATE 12.5 MG: 25 TABLET ORAL at 21:23

## 2018-11-17 RX ADMIN — Medication 1 LOZENGE: at 20:31

## 2018-11-17 RX ADMIN — METOPROLOL TARTRATE 12.5 MG: 25 TABLET ORAL at 09:04

## 2018-11-17 NOTE — PROGRESS NOTES
Nephrology Progress Note Yonatan Soto  
 
[email or web address]                  Phone - [phone] Patient: Deonte Lopez YOB: 1960     Admit Date: 11/14/2018 Date- 11/17/2018 CC: Follow up for ARF Subjective: Interval History:  
-  CR. IMPROVED TO 1.3 
k high Na low No sob Feels better Voiding well Renal usg noted and d.w pt ROS:- as above Assessment: 1. Acute kidney injury, multifactorial, could be due to progression of chronic kidney disease or cardiorenal syndrome. Postrenal obstruction less likely. Vasculitis is less likely. Prerenal factors are possible, although her creatinine got worse after admission. She may have a progression of her renal atrophy, which could explain to her rise in her creatinine. 2.  Chronic kidney disease stage III, baseline creatinine 1.3-1.5. 3.  Proteinuria. 4.  Non-ST elevation myocardial infarction, status post cardiac catheterization, 11/16/2018. 
5.  Chronic obstructive pulmonary disease exacerbation. 6.  hyponatremia 7. Diabetes type 2. 
8.  Hyponatremia, likely pseudohyponatremia due to hyperglycemia. 9.  History of edema. 10.  History of atrophic right kidney. 
  
 
· Plan:  
· Hold torsemide today · No ivf today · Follow na level · Follow serology work up. Renal usg results noted · Po kayexalate · Bmp in am 
· Hold acei or arb for now Physical Exam:  
GEN: NAD, obese NECK- Supple, no thyromegaly RESP: decreased BS b/l, no wheezing CVS: RRR,S1,S2 SKIN: No Rash, ABDO: soft , non tender, No hepatosplenomegaly EXT: + Edema NEURO: non focal, normal speech Care Plan discussed with:pt , nurse Objective:  
Patient Vitals for the past 24 hrs: 
 Temp Pulse Resp BP SpO2  
11/17/18 0333 98.2 °F (36.8 °C) 69 19 143/78 96 % 11/16/18 2335 98 °F (36.7 °C) 68 19 131/73 96 % 11/16/18 2121  71 13 120/51 92 % 11/16/18 2120  70  120/51  11/16/18 2112     94 % 11/16/18 1932 97.9 °F (36.6 °C) 74 22 148/81 93 % 11/16/18 1930  75 20 148/81 (!) 80 % 11/16/18 1527     95 % 11/16/18 1521     95 % 11/16/18 1504 97.8 °F (36.6 °C) 68 25 158/72 97 % 11/16/18 1400  70 19  94 % 11/16/18 1301  71 22 122/67   
11/16/18 1200  (!) 59 19 126/67 92 % 11/16/18 1145  (!) 57 10 129/73 92 % 11/16/18 1136     96 % 11/16/18 1130 98.1 °F (36.7 °C) 62 18 (!) 127/105 95 % 11/16/18 1115  (!) 56 19 146/87 96 % 11/16/18 1100  (!) 58 21 141/86 97 % 11/16/18 1045  (!) 56 19 142/67 93 % 11/16/18 1030  (!) 59 18 130/80 98 % 11/16/18 1015  (!) 59 18 125/64 100 % 11/16/18 1010  (!) 58 20  98 % 11/16/18 1005  (!) 59   98 % 11/16/18 1000  90  118/74 98 % 11/16/18 0957  61   100 % 11/16/18 0950  60   97 % 11/16/18 0947  62  135/83 98 % 11/16/18 0930  65 17 (!) 129/95 90 % 11/16/18 0915  62 20 123/76 90 % 11/16/18 0902  65 14 144/76 (!) 84 % 11/16/18 0845  61 15 145/81 97 % 11/16/18 0843  60 21 147/84 98 % 11/16/18 0840  65 20  100 % 11/16/18 0830 97.6 °F (36.4 °C) 64 18 128/70 90 % 11/16/18 0735 97.8 °F (36.6 °C)     Last 3 Recorded Weights in this Encounter 11/14/18 2253 11/15/18 0654 11/16/18 9244 Weight: 102.1 kg (225 lb) 101.4 kg (223 lb 8.7 oz) 104 kg (229 lb 4.5 oz)  
 
11/15 1901 - 11/17 0700 In: 1198.3 [I.V.:1198.3] Out: 1250 [[facility]:[id number]] Chart reviewed. Pertinent Notes reviewed. Medication list  reviewed Current Facility-Administered Medications Medication  sodium polystyrene (KAYEXALATE) 15 gram/60 mL oral suspension 45 g  
 benzocaine-menthol (CHLORASEPTIC MAX) lozenge 1 Lozenge  albuterol-ipratropium (DUO-NEB) 2.5 MG-0.5 MG/3 ML  
 zinc oxide-cod liver oil (DESITIN) 40 % paste  sodium chloride (NS) flush 5-10 mL  sodium chloride (NS) flush 5-10 mL  acetaminophen (TYLENOL) tablet 650 mg  
  ondansetron (ZOFRAN) injection 4 mg  docusate sodium (COLACE) capsule 100 mg  aspirin delayed-release tablet 81 mg  
 insulin lispro (HUMALOG) injection  glucose chewable tablet 16 g  
 dextrose (D50W) injection syrg 12.5-25 g  
 glucagon (GLUCAGEN) injection 1 mg  
 albuterol-ipratropium (DUO-NEB) 2.5 MG-0.5 MG/3 ML  
 levothyroxine (SYNTHROID) tablet 150 mcg  insulin glargine (LANTUS) injection 12 Units  gabapentin (NEURONTIN) capsule 600 mg  
 metoprolol tartrate (LOPRESSOR) tablet 12.5 mg  
 atorvastatin (LIPITOR) tablet 20 mg  
 methylPREDNISolone (PF) (SOLU-MEDROL) injection 40 mg Data Review : 
Recent Labs 11/17/18 
0203 11/16/18 
0501 11/15/18 
0451 WBC 7.3 11.8* 9.1 HGB 12.0 11.3* 12.0  241 262 ANEU 6.4  --  7.9 * 131* 131*  
K 5.2* 4.4 5.4*  
* 295* 271* BUN 49* 58* 51* CREA 1.28* 1.82* 1.77* ALT  --   --  17 SGOT  --   --  13* TBILI  --   --  0.8 AP  --   --  112 CA 8.7 8.6 8.2* MG 2.4 2.5*  --   
PHOS 2.5* 3.1  --   
 
Lab Results Component Value Date/Time Culture result: NO GROWTH 1 DAY 11/14/2018 04:10 AM  
 Culture result: NO GROWTH 1 DAY 11/14/2018 04:05 AM  
 Culture result: NO GROWTH 6 DAYS 01/24/2018 06:46 AM  
 
No results found for: SDES No results for input(s): FE, TIBC, PSAT, FERR in the last 72 hours. Lab Results Component Value Date/Time Sodium,urine random 44 11/16/2018 11:44 AM  
 Creatinine, urine 33.70 11/16/2018 11:44 AM  
 Creatinine, urine 34.50 11/16/2018 11:44 AM  
 
 
Renal usg Examination is technically challenging due to large body habitus. The 
right kidney measures 10 cm in length. The left kidney measures 9.3 cm in 
length. Both kidneys demonstrate normal cortical echogenicity. No renal calculus 
is seen. There is no hydronephrosis. No renal cyst or solid mass is evident. The 
aorta, iliac arteries, and IVC are not visualized due to body habitus.  The 
 urinary bladder demonstrates no filling defect. 
  
IMPRESSION IMPRESSION:  
No acute abnormality. No hydronephrosis. Sarah Beth Martinez MD 
[facility] 
 www. [email or web address], [address] [place], [address] Phone - [phone] Fax - [phone]

## 2018-11-17 NOTE — PROGRESS NOTES
1915 Bedside shift change report given to me  (oncoming nurse) by Brooke Purdy RN  (offgoing nurse). Report given with SBAR, MAR and Recent Results. Pt awake and on the telephone with sisters . UA specimens collected and sent . 2130 Pt bladder scanned for 3 ml . Voiding via pure wick . 0230 labs drawn and sent . Pt without complaints . 0530 pt awake request pure wick be removed and requests to walk to bathroom . Bath done in the bathroom the patient steady with walker . O2 sats on room air 87-90% . Placed back on supplemental o2 via nasal cannula.

## 2018-11-17 NOTE — PROGRESS NOTES
Hospitalist Progress Note NAME: Juan Francisco Speaker :  1960 MRN:  [id number] This patient is at above high risk of deterioration based on documented presenting clinical data, comorbid conditions, high risk of adverse events and current acute care course. Assessment / Plan: 
Acute renal failure Hyponatremia Hyperkalemia 
-creat trending down to her baseline and I suspect is close now 
-tolerated cardiac cath 
-suspect this is more related to chronic disease + acute injury 
-following with renal team 
-replaced lytes  
-sodium remains low 
-K is at 5.2 -following with renal team 
-renal us noted Acute hypoxic respiratory failure, POA, suspect multifactorial to include NSTEMI and acute on chronic copd exacerbation possibly early bronchitis NSTEMI Tobacco abuse 
-noted cath - ? Type 2 MI 
good FLP profile with decent HDL and LDL, TG 57 
-s/p cath - noted normal 
-continue asa/bb/statin 
-given her sob CTA was ordered to r/o PE - none on report 
-wean o2 to off 
-encourage IS 
-off abx 
-remain on lovenox, asa for now -TTE noted 
-counseled cessation of tobacco - she was not interested 
  
Epigastric pain, hx pancreatitis 3/18 LLQ pain N/V earlier, resolved 
-resolved 
-CT abd neg 
  
Acute encephalopathy, POA Acidosis, mixed Lethargic 
-abg improved - slightly hypercapnic but pH improved 
-encourage up and moving if able 
-confusion resolved 
  
IDDM 2 uncontrolled with renal insufficiency 
-suspect elevated bs playing a role in low sodium 
-correct as able - she is a challenge to manage as she is not watching diet. -correct bs, encourage her back on home regimen with dietary adjustment 
-ssi continues 
  
Hypothyroid 
-continue levothyroxine 
  
Morbid obesity B/l venous stasis Body mass index is 42.51 kg/m².  
-encourage up oob and moving when able and confusion abates 
  
Code:  Presumed full code since patient not reliable, not clear she has capacity at this time with her lethargy. Says she would not want intubation but was full code 3/18. No advance directive on file. DVT prophylaxis: lovenox Surrogate decision maker:  Sister Angie Vilchis Medical Decision Making Today · Acute or chronic illness that poses a threat to life or bodily function · I have reviewed the flowsheet and previous days notes · One or more chronic illnesses with severe exacerbation, progression or side effects of treatment · Review and order of Clinical lab tests · Discuss case with Specialist MD 
 
Subjective: Chief Complaint / Reason for Physician Visit \"i feel good\" up moving in room. Weaning o2 to off. fine\" no cp sob Review of Systems: 
Symptom Y/N Comments  Symptom Y/N Comments Fever/Chills n   Chest Pain n   
Poor Appetite y   Edema Cough n   Abdominal Pain n   
Sputum n   Joint Pain n   
SOB/RAIMREZ y   Pruritis/Rash n   
Nausea/vomit    Tolerating PT/OT Diarrhea n   Tolerating Diet y Constipation n   Other Could NOT obtain due to:   
 
Objective: VITALS:  
Last 24hrs VS reviewed since prior progress note. Most recent are: 
Patient Vitals for the past 24 hrs: 
 Temp Pulse Resp BP SpO2  
11/17/18 1138 98.4 °F (36.9 °C) 65 18  94 % 11/17/18 0836 98.3 °F (36.8 °C) 64 19 152/79 97 % 11/17/18 0333 98.2 °F (36.8 °C) 69 19 143/78 96 % 11/16/18 2335 98 °F (36.7 °C) 68 19 131/73 96 % 11/16/18 2121  71 13 120/51 92 % 11/16/18 2120  70  120/51   
11/16/18 2112     94 % 11/16/18 1932 97.9 °F (36.6 °C) 74 22 148/81 93 % 11/16/18 1930  75 20 148/81 (!) 80 % 11/16/18 1527     95 % 11/16/18 1521     95 % Intake/Output Summary (Last 24 hours) at 11/17/2018 1515 Last data filed at 11/17/2018 1138 Gross per 24 hour Intake 610 ml Output 1550 ml Net -940 ml PHYSICAL EXAM: 
General: WD, obese f sitting up in bed, Alert, cooperative, no acute distress   
EENT:  EOMI. Anicteric sclerae. MM dry Resp:  cta b, no wheezing, No accessory muscle use CV:  Regular  rhythm,  No edema GI:  Soft, Non distended, Non tender.  +Bowel sounds Neurologic:  Alert and oriented X 3, normal speech Psych:   Fair  insight. Not anxious nor agitated Skin:  no rashes or ulcers. No jaundice Reviewed most current lab test results and cultures  YES Reviewed most current radiology test results   YES Review and summation of old records today    NO Reviewed patient's current orders and MAR    YES 
PMH/SH reviewed - no change compared to H&P 
________________________________________________________________________ Care Plan discussed with: 
  Comments Patient x Discussed with patient in room. POC discussed. Questions answered (15 Family RN x Care Manager Consultant: x Cardiology 5 Multidiciplinary team rounds were held today with , nursing, pharmacist and clinical coordinator. Patient's plan of care was discussed; medications were reviewed and discharge planning was addressed. ________________________________________________________________________ Total NON critical care TIME:  30   Minutes Total CRITICAL CARE TIME Spent:   Minutes non procedure based. I have provided critical care time. During this entire length of time I was immediately available to the patient. The reason for providing this level of medical care was due to a critical illness that impaired one or more vital organ systems, such that there was a high probability of imminent or life threatening deterioration in the patient's condition. This care involved high complexity decision making which includes reviewing the patient's past medical records, current laboratory results, and actual Xray films in order to assess, support vital system function, and to treat this degree of vital organ system failure, and to prevent further life threatening deterioration of the patients condition. Comments >50% of visit spent in counseling and coordination of care x See above  
________________________________________________________________________ Procedures: see electronic medical records for all procedures/Xrays and details which were not copied into this note but were reviewed prior to creation of Plan. LABS: 
Recent Labs 11/17/18 
0203 11/16/18 
0501 11/15/18 
0451 WBC 7.3 11.8* 9.1 HGB 12.0 11.3* 12.0 HCT 39.2 36.9 39.8  241 262 Recent Labs 11/17/18 
0203 11/16/18 
0501 11/15/18 
0451 * 131* 131*  
K 5.2* 4.4 5.4* CL 97 97 95* CO2 27 29 29 BUN 49* 58* 51* CREA 1.28* 1.82* 1.77* * 295* 271* CA 8.7 8.6 8.2* MG 2.4 2.5*  --   
PHOS 2.5* 3.1  --   
 
Recent Labs 11/17/18 
0203 11/15/18 
0451 SGOT  --  13* ALT  --  17  
AP  --  112 TBILI  --  0.8 TP  --  6.7 ALB 3.0* 2.8*  
GLOB  --  3.9 No results for input(s): INR, PTP, APTT in the last 72 hours. No lab exists for component: INREXT, INREXT No results for input(s): FE, TIBC, PSAT, FERR in the last 72 hours. No results found for: FOL, RBCF Recent Labs 11/15/18 
0400 11/14/18 
1700 PH 7.31* 7.21* PCO2 60* 59* PO2 94 59* No results for input(s): PHI, PO2I, PCO2I in the last 72 hours. No results for input(s): CPK, CKNDX, TROIQ in the last 72 hours. No lab exists for component: CPKMB Lab Results Component Value Date/Time Cholesterol, total 138 11/15/2018 04:51 AM  
 HDL Cholesterol 58 11/15/2018 04:51 AM  
 LDL, calculated 68.6 11/15/2018 04:51 AM  
 Triglyceride 57 11/15/2018 04:51 AM  
 CHOL/HDL Ratio 2.4 11/15/2018 04:51 AM  
 
Lab Results Component Value Date/Time Glucose (POC) 418 (H) 11/17/2018 10:29 AM  
 Glucose (POC) 396 (H) 11/17/2018 09:00 AM  
 Glucose (POC) 306 (H) 11/16/2018 09:13 PM  
 Glucose (POC) 318 (H) 11/16/2018 05:11 PM  
 Glucose (POC) 287 (H) 11/16/2018 10:24 AM  
 
Lab Results Component Value Date/Time Color YELLOW/STRAW 11/14/2018 03:57 AM  
 Appearance CLEAR 11/14/2018 03:57 AM  
 Specific gravity 1.020 11/14/2018 03:57 AM  
 Specific gravity 1.016 03/16/2018 05:31 PM  
 pH (UA) 5.5 11/14/2018 03:57 AM  
 Protein 100 (A) 11/14/2018 03:57 AM  
 Glucose NEGATIVE  11/14/2018 03:57 AM  
 Ketone 80 (A) 11/14/2018 03:57 AM  
 Bilirubin NEGATIVE  11/14/2018 03:57 AM  
 Urobilinogen 0.2 11/14/2018 03:57 AM  
 Nitrites NEGATIVE  11/14/2018 03:57 AM  
 Leukocyte Esterase NEGATIVE  11/14/2018 03:57 AM  
 Epithelial cells FEW 11/14/2018 03:57 AM  
 Bacteria NEGATIVE  11/14/2018 03:57 AM  
 WBC 0-4 11/14/2018 03:57 AM  
 RBC 0-5 11/14/2018 03:57 AM  
 
 
RADIOGRAPHIC STUDIES: 
CXR Results  (Last 48 hours) None CT Results  (Last 48 hours)  
          
 11/17/18 1357  CTA CHEST W OR W WO CONT Final result Impression:  IMPRESSION:  
No evidence of acute pulmonary embolus. Increased right lower lobe consolidation  
may represent atelectasis or pneumonia. Increased small bilateral pleural  
effusions. Enlarged main pulmonary artery suggests pulmonary hypertension. Narrative:  INDICATION: Shortness of breath COMPARISON: November 14, 2018 TECHNIQUE:    
Routine noncontrast imaging the chest was performed for localization purposes. Then, following the uneventful intravenous administration of 100 cc [id number],  
thin helical axial images were obtained through the chest. 3D image  
postprocessing was performed. CT dose reduction was achieved through use of a  
standardized protocol tailored for this examination and automatic exposure  
control for dose modulation. FINDINGS:  
   
THYROID: No nodule. MEDIASTINUM: No mass or lymphadenopathy. LUDMILA: No mass or lymphadenopathy. THORACIC AORTA: No dissection or aneurysm. PULMONARY ARTERIES: Main pulmonary artery is dilated, measuring 3.7 cm in  
diameter. No evidence of acute pulmonary emboli. TRACHEA/BRONCHI: Patent. ESOPHAGUS: No wall thickening or dilatation. HEART: Normal in size. Mild coronary artery calcifications. PLEURA: Small bilateral pleural effusions, increased. LUNGS: Increased right lower lobe consolidation. Bilateral dependent  
atelectasis. INCIDENTALLY IMAGED UPPER ABDOMEN: No focal abnormality. BONES: No destructive bone lesion. ADDITIONAL COMMENTS: Right-sided fat-containing Bochdalek hernia, unchanged. Echo Results  (Last 48 hours) None VENOUS DOPPLER results  (Last 48 hours) None CULTURES: 
 
No results found for: SDES Lab Results Component Value Date/Time Culture result: NO GROWTH 3 DAYS 11/14/2018 04:10 AM  
 Culture result: NO GROWTH 3 DAYS 11/14/2018 04:05 AM  
 Culture result: NO GROWTH 6 DAYS 01/24/2018 06:46 AM  
  
 
 
Signed: Sagar Michael MD 
 
This note will not be viewable in [address]

## 2018-11-17 NOTE — PROGRESS NOTES
11/17/2018 11:42 AM 
 
Admit Date: 11/14/2018 Admit Diagnosis:  
Acute respiratory failure with hypoxia (HCC);NSTEMI (non-ST elevated myocardial infarction) (HCC);COPD exacerbation (HCC) Subjective:  
 
Keenan Schaeffer denies chest pain or shortness of breath. Current Facility-Administered Medications Medication Dose Route Frequency  predniSONE (DELTASONE) tablet 20 mg  20 mg Oral DAILY WITH BREAKFAST  benzocaine-menthol (CHLORASEPTIC MAX) lozenge 1 Lozenge  1 Lozenge Oral Q2H PRN  zinc oxide-cod liver oil (DESITIN) 40 % paste   Topical PRN  
 sodium chloride (NS) flush 5-10 mL  5-10 mL IntraVENous Q8H  
 sodium chloride (NS) flush 5-10 mL  5-10 mL IntraVENous PRN  
 acetaminophen (TYLENOL) tablet 650 mg  650 mg Oral Q6H PRN  
 ondansetron (ZOFRAN) injection 4 mg  4 mg IntraVENous Q6H PRN  
 docusate sodium (COLACE) capsule 100 mg  100 mg Oral BID  aspirin delayed-release tablet 81 mg  81 mg Oral DAILY  insulin lispro (HUMALOG) injection   SubCUTAneous AC&HS  
 glucose chewable tablet 16 g  4 Tab Oral PRN  
 dextrose (D50W) injection syrg 12.5-25 g  12.5-25 g IntraVENous PRN  
 glucagon (GLUCAGEN) injection 1 mg  1 mg IntraMUSCular PRN  
 albuterol-ipratropium (DUO-NEB) 2.5 MG-0.5 MG/3 ML  3 mL Nebulization Q6H PRN  
 levothyroxine (SYNTHROID) tablet 150 mcg  150 mcg Oral ACB  insulin glargine (LANTUS) injection 12 Units  12 Units SubCUTAneous PCD  gabapentin (NEURONTIN) capsule 600 mg  600 mg Oral QHS  metoprolol tartrate (LOPRESSOR) tablet 12.5 mg  12.5 mg Oral Q12H  
 atorvastatin (LIPITOR) tablet 20 mg  20 mg Oral QHS Objective:  
  
Physical Exam:   
Visit Vitals /79 (BP 1 Location: Left arm, BP Patient Position: At rest) Pulse 65 Temp 98.4 °F (36.9 °C) Resp 18 Ht 5' 1\" (1.549 m) Wt 229 lb 4.5 oz (104 kg) SpO2 94% BMI 43.32 kg/m² Gen:  NAD Mental Status - Alert. General Appearance - Not in acute distress. Chest and Lung Exam  
Inspection: Accessory muscles - No use of accessory muscles in breathing. Auscultation:  
Breath sounds: -A few scattered expiratory wheezes Cardiovascular Inspection: Jugular vein - Bilateral - Inspection Normal.  
Palpation/Percussion:  
Apical Impulse: - Normal.  
Auscultation: Rhythm - Regular. Heart Sounds - S1 WNL and S2 WNL. No S3 or S4. Murmurs & Other Heart Sounds: Auscultation of the heart reveals - No Murmurs. Peripheral Vascular Upper Extremity: Inspection - Bilateral - No Cyanotic nailbeds or Digital clubbing. right radial intact, uncomplicated Lower Extremity:  
Palpation: Edema - Bilateral - No edema. Abdomen:   Soft, non-tender, bowel sounds are active. Neuro: A&O times 3, CN and motor grossly WNL Data Review:  
Recent Labs 11/17/18 
0203 11/16/18 
0501 11/15/18 
0451 WBC 7.3 11.8* 9.1 HGB 12.0 11.3* 12.0 HCT 39.2 36.9 39.8  241 262 Recent Labs 11/17/18 
0203 11/16/18 
0501 11/15/18 
0451 * 131* 131*  
K 5.2* 4.4 5.4* CL 97 97 95* CO2 27 29 29 * 295* 271* BUN 49* 58* 51* CREA 1.28* 1.82* 1.77* CA 8.7 8.6 8.2* MG 2.4 2.5*  --   
PHOS 2.5* 3.1  --   
ALB 3.0*  --  2.8* TBILI  --   --  0.8 SGOT  --   --  13* ALT  --   --  17 No results for input(s): TROIQ, CPK, CKMB in the last 72 hours. Intake/Output Summary (Last 24 hours) at 11/17/2018 1142 Last data filed at 11/17/2018 1331 Gross per 24 hour Intake  Output 1050 ml Net -1050 ml Telemetry: normal sinus rhythm Assessment:  
 
Active Problems: COPD exacerbation ([facility] [address] (11/14/2018) NSTEMI (non-ST elevated myocardial infarction) ([facility] [address] (11/14/2018) Acute respiratory failure with hypoxia ([address] (11/14/2018) Plan:  
 
NSTEMI: 
Cardiac cath showed no obstructive disease from today 11/16/18. Continue to rehydrate patient post-cath. Monitor creatinine.  
Per Dr. Karlyn Cabot, probably has type II MI.  
 ECHO shows EF 55-60% with mildly dilated RA, LA, RV and IVC. Continue ASA, statin, and BB. Continue with medical management. I discussed with Dr. Roel Cohen today. In light of troponin elevation, acute hypoxic respiratory failure, RV dilatation, he plans to get a chest CTA to rule out pulmonary embolism. Otherwise, all the above findings are likely due to acute on chronic hypoxic respiratory failure causing a slight troponin bump due to global myocardial hypoxia. Thanks for the consult. I will sign off for now. Please call if any questions or concerns.   Follow-up with Dr. Rani Thomas after dischargeadded to discharge instructions.

## 2018-11-17 NOTE — PROGRESS NOTES
DVT ppx CXR - negative for PE Please clarify if DVT ppx is desired - Lovenox dc'd in prep for CCL 11/14 Thank you, 
Gina Burt, [facility]

## 2018-11-18 VITALS
SYSTOLIC BLOOD PRESSURE: 144 MMHG | OXYGEN SATURATION: 91 % | DIASTOLIC BLOOD PRESSURE: 89 MMHG | BODY MASS INDEX: 43.29 KG/M2 | HEIGHT: 61 IN | TEMPERATURE: 97.9 F | HEART RATE: 60 BPM | RESPIRATION RATE: 18 BRPM | WEIGHT: 229.28 LBS

## 2018-11-18 LAB
ALBUMIN SERPL-MCNC: 3 G/DL (ref 3.5–5)
ANA SER QL: NEGATIVE
ANION GAP SERPL CALC-SCNC: 6 MMOL/L (ref 5–15)
BUN SERPL-MCNC: 38 MG/DL (ref 6–20)
BUN/CREAT SERPL: 33 (ref 12–20)
C3 SERPL-MCNC: 118 MG/DL (ref 82–167)
C4 SERPL-MCNC: 32 MG/DL (ref 14–44)
CALCIUM SERPL-MCNC: 8.8 MG/DL (ref 8.5–10.1)
CHLORIDE SERPL-SCNC: 98 MMOL/L (ref 97–108)
CO2 SERPL-SCNC: 30 MMOL/L (ref 21–32)
CREAT SERPL-MCNC: 1.14 MG/DL (ref 0.55–1.02)
GLUCOSE BLD STRIP.AUTO-MCNC: 138 MG/DL (ref 65–100)
GLUCOSE SERPL-MCNC: 114 MG/DL (ref 65–100)
MAGNESIUM SERPL-MCNC: 2.4 MG/DL (ref 1.6–2.4)
PHOSPHATE SERPL-MCNC: 2.6 MG/DL (ref 2.6–4.7)
POTASSIUM SERPL-SCNC: 4.3 MMOL/L (ref 3.5–5.1)
SEE BELOW:, 164879: NORMAL
SERVICE CMNT-IMP: ABNORMAL
SODIUM SERPL-SCNC: 134 MMOL/L (ref 136–145)

## 2018-11-18 PROCEDURE — 94760 N-INVAS EAR/PLS OXIMETRY 1: CPT

## 2018-11-18 PROCEDURE — 82962 GLUCOSE BLOOD TEST: CPT

## 2018-11-18 PROCEDURE — 80069 RENAL FUNCTION PANEL: CPT

## 2018-11-18 PROCEDURE — 74011250637 HC RX REV CODE- 250/637: Performed by: HOSPITALIST

## 2018-11-18 PROCEDURE — 36415 COLL VENOUS BLD VENIPUNCTURE: CPT

## 2018-11-18 PROCEDURE — 74011636637 HC RX REV CODE- 636/637: Performed by: INTERNAL MEDICINE

## 2018-11-18 PROCEDURE — 74011250637 HC RX REV CODE- 250/637: Performed by: NURSE PRACTITIONER

## 2018-11-18 PROCEDURE — 83735 ASSAY OF MAGNESIUM: CPT

## 2018-11-18 RX ORDER — ATORVASTATIN CALCIUM 20 MG/1
20 TABLET, FILM COATED ORAL
Qty: 30 TAB | Refills: 0 | Status: SHIPPED | OUTPATIENT
Start: 2018-11-18 | End: 2018-12-18

## 2018-11-18 RX ORDER — METOPROLOL TARTRATE 25 MG/1
12.5 TABLET, FILM COATED ORAL EVERY 12 HOURS
Qty: 30 TAB | Refills: 0 | Status: SHIPPED | OUTPATIENT
Start: 2018-11-18 | End: 2018-12-18

## 2018-11-18 RX ADMIN — Medication 10 ML: at 06:27

## 2018-11-18 RX ADMIN — METOPROLOL TARTRATE 12.5 MG: 25 TABLET ORAL at 09:03

## 2018-11-18 RX ADMIN — ASPIRIN 81 MG: 81 TABLET, COATED ORAL at 09:03

## 2018-11-18 RX ADMIN — LEVOTHYROXINE SODIUM 150 MCG: 150 TABLET ORAL at 06:27

## 2018-11-18 RX ADMIN — PREDNISONE 20 MG: 20 TABLET ORAL at 09:03

## 2018-11-18 NOTE — DISCHARGE SUMMARY
Hospitalist Discharge Summary    NAME: Pranay Alexander   :  1960   MRN:  [id number]     DISCHARGE DIAGNOSIS:  Acute hypoxic respiratory failure, POA, suspect multifactorial to include NSTEMI and acute on chronic copd exacerbation possibly early bronchitis  NSTEMI   Acute renal failure  Hyponatremia  Hyperkalemia  Tobacco abuse  Epigastric pain, hx pancreatitis 3/18  LLQ pain  N/V earlier, resolved  Acute encephalopathy, POA  Acidosis, mixed  Lethargic  IDDM 2 uncontrolled with renal insufficiency  Hypothyroid  Morbid obesity  B/l venous stasis    CONSULTATIONS:  Cardiology and Nephrology    Follow Up: Follow-up Information     Follow up With Specialties Details Why Contact Info    Abelino Church MD Cardiology, [address] [facility] Vascular Surgery, Internal Medicine Schedule an appointment as soon as possible for a visit in 1 month  [address]  [address] [facility] Brittany Sloan MD Internal Medicine Schedule an appointment as soon as possible for a visit in 1 week Call Sooner, As needed, If symptoms worsen [address] Dionne Iqbal [phone]      Dash Tam MD Nephrology Schedule an appointment as soon as possible for a visit in 2 weeks Call Sooner, If symptoms worsen, As needed [address] [facility]  [phone]            Procedures: see electronic medical records for all procedures/Xrays and details which were not copied into this note but were reviewed prior to creation of Plan. Please follow-up tests/labs that are still pendin. None     PMH/SH reviewed - no change compared to H&P    DISCHARGE SUMMARY/HOSPITAL COURSE: for full details see H&P, daily progress notes, labs, consult notes. Briefly As Per HPI:   Pranay Alexander is a 59 y.o.  female with DM, DKA 3/18, COPD, tobacco smoker, HTN, morbid obesity who is transferred from [facility] ER to [address] ER. Patient lethargic, oriented to self, poor historian. Report going to outside ER due to nausea and vomiting, nonproductive cough, wheezing, followed by SOB, sorethroat, started last night. Denies having chest pain at any time. Denies abdominal pain, diarrhea. No fever or chills.     At outside ER, patient noted to be in respiratory distress, wheezing, alert and oriented x 3. Found to have trop 2.59. Patient given aspirin, zofran, solumedrol, duoneb x 1 at outside ER. On arrival here 87% RA, 93% on 2L. Given levaquin.     Last admitted 3/18 with DKA, pancreatitis due to DKA, ann on ckd 3, acute gout flare.     We were asked to admit for work up and evaluation of the above problems.            The patient's hospital course was complicated by:  Acute hypoxic respiratory failure, POA, suspect multifactorial to include NSTEMI and acute on chronic copd exacerbation possibly early bronchitis  NSTEMI   Acute renal failure  Hyponatremia  Hyperkalemia  Tobacco abuse  Epigastric pain, hx pancreatitis 3/18  LLQ pain  N/V earlier, resolved  Acute encephalopathy, POA  Acidosis, mixed  Lethargic  IDDM 2 uncontrolled with renal insufficiency  Hypothyroid  Morbid obesity  B/l venous stasis    Patient with complex inpatient course. Please see all notes, labs, vitals, testing and procedures for details, briefly the patient was admitted following presentation to ED with n/v. Found to have NSTEMI with cardiac cath without findings of obstructive disease. High risk factors and therefore elevated risk for future disease therefore continue on asa, bb, statin therapy. Patient is tolerating. otehrwise noted to have ANN on CKD. Nephrology aid appreciated - suspected to have been dehydrated along with taking her demadex. Now improved to her baseline 1.3-1.5. Safe for dc at this time after her hypoxic failure, attributed to NSTEMI and copd flare, has resolved.  CTA chest neg for PE.  _______________________________________________________________________   Patient seen and examined by me on day of discharge. Pertinent findings are:  Gen: wd obese f in nad  HEENT: ncat  Chest: ctab  Cv: no r/g   Abd: s/nd/ nt +BS  Neuro: cn 2-12 gi    See Discharge Instructions for further details. _______________________________________________________________________    Medications Reviewed:  Current Discharge Medication List      START taking these medications    Details   atorvastatin (LIPITOR) 20 mg tablet Take 1 Tab by mouth nightly for 30 days. Qty: 30 Tab, Refills: 0      metoprolol tartrate (LOPRESSOR) 25 mg tablet Take 0.5 Tabs by mouth every twelve (12) hours for 30 days. Qty: 30 Tab, Refills: 0         CONTINUE these medications which have NOT CHANGED    Details   potassium chloride (K-DUR, KLOR-CON) 20 mEq tablet Take 20 mEq by mouth every other day. Alternates day with torsemide      torsemide (DEMADEX) 20 mg tablet Take 20 mg by mouth every other day. Alternates administration w/ potassium supplement      silver sulfADIAZINE (SILVADENE) 1 % topical cream Apply 1 Each to affected area every Monday, Wednesday, Friday. Apply to legs      zinc 50 mg tab tablet Take 50 mg by mouth daily. insulin aspart U-100 (NOVOLOG FLEXPEN U-100 INSULIN) 100 unit/mL inpn 5 Units by SubCUTAneous route Before breakfast, lunch, and dinner. + SSI TIDAC:  140-199=2 u            200-249=3 u  250-299=5 u  300-349=8 u  Qty: 3 Adjustable Dose Pre-filled Pen Syringe, Refills: 0      levothyroxine (SYNTHROID) 150 mcg tablet Take 150 mcg by mouth Daily (before breakfast). gabapentin (NEURONTIN) 300 mg capsule Take 600 mg by mouth nightly. insulin glargine (LANTUS) 100 unit/mL injection 12 Units by SubCUTAneous route daily (after dinner). Patient takes at 1830 each day      ipratropium-albuterol (COMBIVENT RESPIMAT)  mcg/actuation inhaler Take 1 Puff by inhalation every six (6) hours as needed for Shortness of Breath. aspirin delayed-release 81 mg tablet Take 81 mg by mouth daily. _______________________________________________________________________    Risk of deterioration: Moderate  ________________________________________________________________________    Disposition  Home with family, no needs  ________________________________________________________________________    Care Plan discussed with:   Patient, Family, RN, Care Manager, Consultant  ________________________________________________________________________    Code Status: Full Code  ________________________________________________________________________    Total time spent in discharge (min): 40   ________________________________________________________________________    CDMP Checked: Yes    Signed: Toñito Haider, MD    This note will not be viewable in [address]     3

## 2018-11-18 NOTE — DISCHARGE INSTRUCTIONS
DISCHARGE DIAGNOSIS:    Acute hypoxic respiratory failure, POA, suspect multifactorial to include NSTEMI and acute on chronic copd exacerbation possibly early bronchitis  NSTEMI   Acute renal failure  Hyponatremia  Hyperkalemia  Tobacco abuse  Epigastric pain, hx pancreatitis 3/18  LLQ pain  N/V earlier, resolved  Acute encephalopathy, POA  Acidosis, mixed  Lethargic  IDDM 2 uncontrolled with renal insufficiency  Hypothyroid  Morbid obesity  B/l venous stasis    MEDICATIONS:  · It is important that you take the medication exactly as they are prescribed. · Keep your medication in the bottles provided by the pharmacist and keep a list of the medication names, dosages, and times to be taken in your wallet. · Do not take other medications without consulting your doctor. Pain Management: per above medications    What to do at Home    Recommended diet:  Cardiac Diet and Diabetic Diet    Recommended activity: activity as tolerated - STOP SMOKING!!    If you have questions regarding the hospital related prescriptions or hospital related issues please call [facility] [place] [address] 190 at . You can always direct your questions to your primary care doctor if you are unable to reach your hospital physician; your PCP works as an extension of your hospital doctor just like your hospital doctor is an extension of your PCP for your time at the hospital [facility]).     If you experience any of the following symptoms then please call your primary care physician or return to the emergency room if you cannot get hold of your doctor:  Fever, chills, nausea, vomiting, diarrhea, change in mentation, falling, bleeding, shortness of breath,    STOP SMOKING and take your medicine as directed

## 2018-11-18 NOTE — PROGRESS NOTES
Bedside report received from Assessment, Background, Procedure summary, Intake/Output, MAR, and recent results discussed. Care assumed. Pt ambulated around room for approximately 5mins on room air. O2 sats > 90%m during ambulation. Pt appears steady with walker and has no complaints of pain, shortness of breath, dizziness, or light-headedness. Incision appears clean, dry, and intact with no swelling or hematoma present. Right  radial Tegaderm removed. Discharge instructions reviewed with patient and sister. Allowed adequate time to ask questions, all questions answered. Printed copy of AVS given to patient. All belongings gathered, IV and tele discontinued. Transported via wheelchair by [employer] to main entrance and into care of family.

## 2018-11-18 NOTE — PROGRESS NOTES
0400 Pt awaken for vitals O2 sat noted to be 82 % for several minutes . O2 placed and pt and layne gradually anabelle to > 90%. 0630 pt up to bathroom O2 removed will monitor pt on room air . 0730 Bedside shift change report given to Keri Houston 67 (oncoming nurse) by me (offgoing nurse). Report given with SBAR, MAR and Recent Results.

## 2018-11-18 NOTE — PROGRESS NOTES
Bedside report received from Khai Ocasiochdestin, [address] [place] [address] Assessment, Background, Procedure summary, Intake/Output, MAR, and recent results discussed. Care assumed. CTA completed. Dr. Mallika Max to update patient's sister. Verbal report given to oncoming nurse Khai Delarosa, RN Report consisted of patients Situation, Background, Assessment, and  
Recommendations Information was reviewed with the receiving nurse. Opportunity for questions and clarification was provided.    
 
Amalia Sofia RN

## 2018-11-19 LAB
C-ANCA TITR SER IF: NORMAL TITER
IGA SERPL-MCNC: 124 MG/DL (ref 87–352)
IGG SERPL-MCNC: 905 MG/DL (ref 700–1600)
IGM SERPL-MCNC: 82 MG/DL (ref 26–217)
MYELOPEROXIDASE AB SER IA-ACNC: <9 U/ML (ref 0–9)
P-ANCA ATYPICAL TITR SER IF: NORMAL TITER
P-ANCA TITR SER IF: NORMAL TITER
PROT PATTERN SERPL IFE-IMP: NORMAL
PROTEINASE3 AB SER IA-ACNC: <3.5 U/ML (ref 0–3.5)

## 2018-11-21 LAB — CRYOGLOB SER QL 1D COLD INC: NORMAL

## 2019-01-07 ENCOUNTER — HOSPITAL ENCOUNTER (INPATIENT)
Age: 59
LOS: 4 days | Discharge: NURSING, REHAB OR LONG-TERM CARE | DRG: 064 | End: 2019-01-11
Attending: EMERGENCY MEDICINE | Admitting: INTERNAL MEDICINE
Payer: MEDICARE

## 2019-01-07 ENCOUNTER — APPOINTMENT (OUTPATIENT)
Dept: GENERAL RADIOLOGY | Age: 59
DRG: 064 | End: 2019-01-07
Attending: EMERGENCY MEDICINE
Payer: MEDICARE

## 2019-01-07 DIAGNOSIS — I63.9 CEREBROVASCULAR ACCIDENT (CVA), UNSPECIFIED MECHANISM (HCC): Primary | ICD-10-CM

## 2019-01-07 DIAGNOSIS — J96.01 ACUTE RESPIRATORY FAILURE WITH HYPOXIA (HCC): ICD-10-CM

## 2019-01-07 DIAGNOSIS — J18.9 COMMUNITY ACQUIRED PNEUMONIA, UNSPECIFIED LATERALITY: ICD-10-CM

## 2019-01-07 DIAGNOSIS — I21.4 NSTEMI (NON-ST ELEVATED MYOCARDIAL INFARCTION) (HCC): ICD-10-CM

## 2019-01-07 DIAGNOSIS — I65.23 BILATERAL CAROTID ARTERY STENOSIS: ICD-10-CM

## 2019-01-07 PROCEDURE — 74011250636 HC RX REV CODE- 250/636: Performed by: INTERNAL MEDICINE

## 2019-01-07 PROCEDURE — 72100 X-RAY EXAM L-S SPINE 2/3 VWS: CPT

## 2019-01-07 PROCEDURE — 65660000000 HC RM CCU STEPDOWN

## 2019-01-07 PROCEDURE — 99285 EMERGENCY DEPT VISIT HI MDM: CPT

## 2019-01-07 PROCEDURE — 94761 N-INVAS EAR/PLS OXIMETRY MLT: CPT

## 2019-01-07 RX ORDER — GUAIFENESIN 100 MG/5ML
81 LIQUID (ML) ORAL DAILY
Status: DISCONTINUED | OUTPATIENT
Start: 2019-01-08 | End: 2019-01-11 | Stop reason: HOSPADM

## 2019-01-07 RX ORDER — SODIUM CHLORIDE 0.9 % (FLUSH) 0.9 %
5-40 SYRINGE (ML) INJECTION EVERY 8 HOURS
Status: DISCONTINUED | OUTPATIENT
Start: 2019-01-07 | End: 2019-01-11 | Stop reason: HOSPADM

## 2019-01-07 RX ORDER — ACETAMINOPHEN 650 MG/1
650 SUPPOSITORY RECTAL
Status: DISCONTINUED | OUTPATIENT
Start: 2019-01-07 | End: 2019-01-11 | Stop reason: HOSPADM

## 2019-01-07 RX ORDER — ACETAMINOPHEN 325 MG/1
650 TABLET ORAL
Status: DISCONTINUED | OUTPATIENT
Start: 2019-01-07 | End: 2019-01-11 | Stop reason: HOSPADM

## 2019-01-07 RX ORDER — SODIUM CHLORIDE 0.9 % (FLUSH) 0.9 %
5-40 SYRINGE (ML) INJECTION AS NEEDED
Status: DISCONTINUED | OUTPATIENT
Start: 2019-01-07 | End: 2019-01-11 | Stop reason: HOSPADM

## 2019-01-07 RX ORDER — ENOXAPARIN SODIUM 100 MG/ML
40 INJECTION SUBCUTANEOUS EVERY 12 HOURS
Status: DISCONTINUED | OUTPATIENT
Start: 2019-01-07 | End: 2019-01-11 | Stop reason: HOSPADM

## 2019-01-07 RX ADMIN — ENOXAPARIN SODIUM 40 MG: 40 INJECTION SUBCUTANEOUS at 21:11

## 2019-01-08 ENCOUNTER — APPOINTMENT (OUTPATIENT)
Dept: MRI IMAGING | Age: 59
DRG: 064 | End: 2019-01-08
Attending: PSYCHIATRY & NEUROLOGY
Payer: MEDICARE

## 2019-01-08 PROBLEM — R77.8 ELEVATED TROPONIN: Status: ACTIVE | Noted: 2019-01-08

## 2019-01-08 LAB
ANION GAP SERPL CALC-SCNC: 8 MMOL/L (ref 5–15)
BASOPHILS # BLD: 0 K/UL (ref 0–0.1)
BASOPHILS NFR BLD: 0 % (ref 0–1)
BUN SERPL-MCNC: 27 MG/DL (ref 6–20)
BUN/CREAT SERPL: 16 (ref 12–20)
CALCIUM SERPL-MCNC: 8.7 MG/DL (ref 8.5–10.1)
CHLORIDE SERPL-SCNC: 97 MMOL/L (ref 97–108)
CO2 SERPL-SCNC: 32 MMOL/L (ref 21–32)
CREAT SERPL-MCNC: 1.67 MG/DL (ref 0.55–1.02)
DIFFERENTIAL METHOD BLD: ABNORMAL
EOSINOPHIL # BLD: 0 K/UL (ref 0–0.4)
EOSINOPHIL NFR BLD: 0 % (ref 0–7)
ERYTHROCYTE [DISTWIDTH] IN BLOOD BY AUTOMATED COUNT: 16.4 % (ref 11.5–14.5)
GLUCOSE BLD STRIP.AUTO-MCNC: 187 MG/DL (ref 65–100)
GLUCOSE BLD STRIP.AUTO-MCNC: 207 MG/DL (ref 65–100)
GLUCOSE BLD STRIP.AUTO-MCNC: 210 MG/DL (ref 65–100)
GLUCOSE SERPL-MCNC: 226 MG/DL (ref 65–100)
HCT VFR BLD AUTO: 40.6 % (ref 35–47)
HGB BLD-MCNC: 12.2 G/DL (ref 11.5–16)
IMM GRANULOCYTES # BLD: 0.1 K/UL (ref 0–0.04)
IMM GRANULOCYTES NFR BLD AUTO: 1 % (ref 0–0.5)
LYMPHOCYTES # BLD: 1.1 K/UL (ref 0.8–3.5)
LYMPHOCYTES NFR BLD: 11 % (ref 12–49)
MCH RBC QN AUTO: 24 PG (ref 26–34)
MCHC RBC AUTO-ENTMCNC: 30 G/DL (ref 30–36.5)
MCV RBC AUTO: 79.9 FL (ref 80–99)
MONOCYTES # BLD: 0.9 K/UL (ref 0–1)
MONOCYTES NFR BLD: 9 % (ref 5–13)
NEUTS SEG # BLD: 8.4 K/UL (ref 1.8–8)
NEUTS SEG NFR BLD: 79 % (ref 32–75)
NRBC # BLD: 0 K/UL (ref 0–0.01)
NRBC BLD-RTO: 0 PER 100 WBC
PLATELET # BLD AUTO: 253 K/UL (ref 150–400)
PMV BLD AUTO: 10.3 FL (ref 8.9–12.9)
POTASSIUM SERPL-SCNC: 4 MMOL/L (ref 3.5–5.1)
RBC # BLD AUTO: 5.08 M/UL (ref 3.8–5.2)
SERVICE CMNT-IMP: ABNORMAL
SODIUM SERPL-SCNC: 137 MMOL/L (ref 136–145)
TROPONIN I SERPL-MCNC: 0.26 NG/ML
TROPONIN I SERPL-MCNC: 0.36 NG/ML
WBC # BLD AUTO: 10.5 K/UL (ref 3.6–11)

## 2019-01-08 PROCEDURE — 74011000250 HC RX REV CODE- 250: Performed by: INTERNAL MEDICINE

## 2019-01-08 PROCEDURE — 97530 THERAPEUTIC ACTIVITIES: CPT

## 2019-01-08 PROCEDURE — 85025 COMPLETE CBC W/AUTO DIFF WBC: CPT

## 2019-01-08 PROCEDURE — 77030029684 HC NEB SM VOL KT MONA -A

## 2019-01-08 PROCEDURE — 70544 MR ANGIOGRAPHY HEAD W/O DYE: CPT

## 2019-01-08 PROCEDURE — 36415 COLL VENOUS BLD VENIPUNCTURE: CPT

## 2019-01-08 PROCEDURE — 82962 GLUCOSE BLOOD TEST: CPT

## 2019-01-08 PROCEDURE — 80048 BASIC METABOLIC PNL TOTAL CA: CPT

## 2019-01-08 PROCEDURE — 92610 EVALUATE SWALLOWING FUNCTION: CPT

## 2019-01-08 PROCEDURE — 97535 SELF CARE MNGMENT TRAINING: CPT

## 2019-01-08 PROCEDURE — 97162 PT EVAL MOD COMPLEX 30 MIN: CPT

## 2019-01-08 PROCEDURE — 74011250637 HC RX REV CODE- 250/637: Performed by: INTERNAL MEDICINE

## 2019-01-08 PROCEDURE — 70547 MR ANGIOGRAPHY NECK W/O DYE: CPT

## 2019-01-08 PROCEDURE — 94640 AIRWAY INHALATION TREATMENT: CPT

## 2019-01-08 PROCEDURE — 74011250636 HC RX REV CODE- 250/636: Performed by: INTERNAL MEDICINE

## 2019-01-08 PROCEDURE — 97165 OT EVAL LOW COMPLEX 30 MIN: CPT

## 2019-01-08 PROCEDURE — 65660000000 HC RM CCU STEPDOWN

## 2019-01-08 PROCEDURE — 84484 ASSAY OF TROPONIN QUANT: CPT

## 2019-01-08 PROCEDURE — 93880 EXTRACRANIAL BILAT STUDY: CPT

## 2019-01-08 PROCEDURE — 74011636637 HC RX REV CODE- 636/637: Performed by: INTERNAL MEDICINE

## 2019-01-08 RX ORDER — MAGNESIUM SULFATE 100 %
4 CRYSTALS MISCELLANEOUS AS NEEDED
Status: DISCONTINUED | OUTPATIENT
Start: 2019-01-08 | End: 2019-01-11 | Stop reason: HOSPADM

## 2019-01-08 RX ORDER — GABAPENTIN 300 MG/1
600 CAPSULE ORAL
Status: DISCONTINUED | OUTPATIENT
Start: 2019-01-08 | End: 2019-01-11 | Stop reason: HOSPADM

## 2019-01-08 RX ORDER — ACETAMINOPHEN 325 MG/1
650 TABLET ORAL
Status: DISCONTINUED | OUTPATIENT
Start: 2019-01-08 | End: 2019-01-08 | Stop reason: SDUPTHER

## 2019-01-08 RX ORDER — IPRATROPIUM BROMIDE AND ALBUTEROL SULFATE 2.5; .5 MG/3ML; MG/3ML
3 SOLUTION RESPIRATORY (INHALATION)
Status: DISCONTINUED | OUTPATIENT
Start: 2019-01-08 | End: 2019-01-11 | Stop reason: HOSPADM

## 2019-01-08 RX ORDER — POTASSIUM CHLORIDE 20 MEQ/1
20 TABLET, EXTENDED RELEASE ORAL EVERY OTHER DAY
Status: DISCONTINUED | OUTPATIENT
Start: 2019-01-08 | End: 2019-01-08

## 2019-01-08 RX ORDER — LEVOTHYROXINE SODIUM 75 UG/1
150 TABLET ORAL
Status: DISCONTINUED | OUTPATIENT
Start: 2019-01-08 | End: 2019-01-11 | Stop reason: HOSPADM

## 2019-01-08 RX ORDER — TORSEMIDE 20 MG/1
20 TABLET ORAL EVERY OTHER DAY
Status: DISCONTINUED | OUTPATIENT
Start: 2019-01-08 | End: 2019-01-08

## 2019-01-08 RX ORDER — GUAIFENESIN 100 MG/5ML
100 SOLUTION ORAL
Status: DISCONTINUED | OUTPATIENT
Start: 2019-01-08 | End: 2019-01-11 | Stop reason: HOSPADM

## 2019-01-08 RX ORDER — LABETALOL HYDROCHLORIDE 5 MG/ML
5 INJECTION, SOLUTION INTRAVENOUS
Status: DISCONTINUED | OUTPATIENT
Start: 2019-01-08 | End: 2019-01-11 | Stop reason: HOSPADM

## 2019-01-08 RX ORDER — DEXTROSE 50 % IN WATER (D50W) INTRAVENOUS SYRINGE
12.5-25 AS NEEDED
Status: DISCONTINUED | OUTPATIENT
Start: 2019-01-08 | End: 2019-01-11 | Stop reason: HOSPADM

## 2019-01-08 RX ORDER — ACETAMINOPHEN 650 MG/1
650 SUPPOSITORY RECTAL
Status: DISCONTINUED | OUTPATIENT
Start: 2019-01-08 | End: 2019-01-08 | Stop reason: SDUPTHER

## 2019-01-08 RX ORDER — IPRATROPIUM BROMIDE AND ALBUTEROL SULFATE 2.5; .5 MG/3ML; MG/3ML
3 SOLUTION RESPIRATORY (INHALATION)
Status: DISCONTINUED | OUTPATIENT
Start: 2019-01-08 | End: 2019-01-10

## 2019-01-08 RX ORDER — INSULIN GLARGINE 100 [IU]/ML
12 INJECTION, SOLUTION SUBCUTANEOUS
Status: DISCONTINUED | OUTPATIENT
Start: 2019-01-08 | End: 2019-01-11 | Stop reason: HOSPADM

## 2019-01-08 RX ORDER — IPRATROPIUM BROMIDE AND ALBUTEROL SULFATE 2.5; .5 MG/3ML; MG/3ML
3 SOLUTION RESPIRATORY (INHALATION)
Status: DISCONTINUED | OUTPATIENT
Start: 2019-01-08 | End: 2019-01-08

## 2019-01-08 RX ORDER — INSULIN LISPRO 100 [IU]/ML
INJECTION, SOLUTION INTRAVENOUS; SUBCUTANEOUS
Status: DISCONTINUED | OUTPATIENT
Start: 2019-01-08 | End: 2019-01-11 | Stop reason: HOSPADM

## 2019-01-08 RX ORDER — ASPIRIN 81 MG/1
81 TABLET ORAL DAILY
Status: DISCONTINUED | OUTPATIENT
Start: 2019-01-08 | End: 2019-01-08

## 2019-01-08 RX ADMIN — INSULIN LISPRO 2 UNITS: 100 INJECTION, SOLUTION INTRAVENOUS; SUBCUTANEOUS at 17:27

## 2019-01-08 RX ADMIN — Medication 10 ML: at 22:00

## 2019-01-08 RX ADMIN — ACETAMINOPHEN 650 MG: 325 TABLET ORAL at 19:54

## 2019-01-08 RX ADMIN — ASPIRIN 81 MG 81 MG: 81 TABLET ORAL at 15:12

## 2019-01-08 RX ADMIN — INSULIN GLARGINE 12 UNITS: 100 INJECTION, SOLUTION SUBCUTANEOUS at 19:50

## 2019-01-08 RX ADMIN — GABAPENTIN 600 MG: 300 CAPSULE ORAL at 05:42

## 2019-01-08 RX ADMIN — GUAIFENESIN 100 MG: 200 SOLUTION ORAL at 05:42

## 2019-01-08 RX ADMIN — IPRATROPIUM BROMIDE AND ALBUTEROL SULFATE 3 ML: .5; 3 SOLUTION RESPIRATORY (INHALATION) at 20:14

## 2019-01-08 RX ADMIN — GABAPENTIN 600 MG: 300 CAPSULE ORAL at 22:00

## 2019-01-08 RX ADMIN — ENOXAPARIN SODIUM 40 MG: 40 INJECTION SUBCUTANEOUS at 15:12

## 2019-01-08 NOTE — PROGRESS NOTES
Enoxaparin 40 mg subcutaneous every 24 hours changed to enoxaparin 40 mg subcutaneous every 12 hours due to BMI greater than 40.  
 
Thank you, 
Deb Glass, PHARMD

## 2019-01-08 NOTE — ED NOTES
Routine labs drawn and sent to the lab. Pt vitals checked. Pt repositioned for comfort. No further needs expressed at this time.

## 2019-01-08 NOTE — PROGRESS NOTES
Speech Pathology bedside swallow evaluation/discharge Patient: Rahul Saenz (23 y.o. female) Date: 1/8/2019 Primary Diagnosis: CVA (cerebral vascular accident) ([facility] [address] Precautions:     
 
ASSESSMENT : 
Based on the objective data described below, the patient presents with an intact oropharyngeal swallow. Patient with timely/complete mastication, timely swallow initiation, functional hyolaryngeal elevation/excursion, and no overt s/s aspiration observed. Patient did have productive cough x1 before PO intake. Patient reported motor speech, language, and cognition are all at baseline. Patient oriented x4. Skilled acute therapy provided by a speech-language pathologist is not indicated at this time. PLAN : 
Recommendations: 
--Regular/thin liquid diet 
--Straws ok 
--Meds whole Discharge Recommendations: None SUBJECTIVE:  
Patient stated I want some coffee.  OBJECTIVE:  
 
Past Medical History:  
Diagnosis Date  Arthritis  Asthma  Bronchitis  Developmental delay  GERD (gastroesophageal reflux disease)  Hypercholesterolemia  Hypertension  Hypothyroid  IDDM (insulin dependent diabetes mellitus) ([facility] [address]  Morbid obesity ([facility] [address]  Peripheral neuropathy  Thyroid disease  Venous insufficiency Past Surgical History:  
Procedure Laterality Date  HX AMPUTATION Bilateral   
 toes  HX COLONOSCOPY  2015 Prior Level of Function/Home Situation:  
 Diet prior to admission: regular/thin Current Diet:  None ordered Cognitive and Communication Status: 
Neurologic State: Alert, Appropriate for age Orientation Level: Oriented X4 Cognition: Follows commands, Appropriate for age attention/concentration Perception: Appears intact Perseveration: No perseveration noted Safety/Judgement: Awareness of environment Oral Assessment: 
Oral Assessment Labial: No impairment Dentition: Natural;Full Oral Hygiene: moist oral mucosa Lingual: No impairment Velum: No impairment Mandible: No impairment P.O. Trials: 
Patient Position: upright in bed Vocal quality prior to P.O.: No impairment Consistency Presented: Ice chips; Thin liquid;Puree; Solid How Presented: Self-fed/presented;Cup/sip;Cup/gulp; Spoon;Straw;Successive swallows Bolus Acceptance: No impairment Bolus Formation/Control: No impairment Propulsion: No impairment Oral Residue: None Initiation of Swallow: No impairment Laryngeal Elevation: Functional 
Aspiration Signs/Symptoms: None Pharyngeal Phase Characteristics: No impairment, issues, or problems Effective Modifications: None Cues for Modifications: None Oral Phase Severity: No impairment Pharyngeal Phase Severity : No impairment NOMS:  
The NOMS functional outcome measure was used to quantify this patient's level of swallowing impairment. Based on the NOMS, the patient was determined to be at level 7 for swallow function NOMS Swallowing Levels: 
Level 1 (CN): NPO Level 2 (CM): NPO but takes consistency in therapy Level 3 (CL): Takes less than 50% of nutrition p.o. and continues with nonoral feedings; and/or safe with mod cues; and/or max diet restriction Level 4 (CK): Safe swallow but needs mod cues; and/or mod diet restriction; and/or still requires some nonoral feeding/supplements Level 5 (CJ): Safe swallow with min diet restriction; and/or needs min cues Level 6 (CI): Independent with p.o.; rare cues; usually self cues; may need to avoid some foods or needs extra time Level 7 (CH): Independent for all p.o. CARYN (2003). National Outcomes Measurement System (NOMS): Adult Speech-Language Pathology User's Guide. Pain: 
Pain Scale 1: Numeric (0 - 10) Pain Intensity 1: 10 
Pain Location 1: Back After treatment:  
[] Patient left in no apparent distress sitting up in chair 
[x] Patient left in no apparent distress in bed 
[x] Call bell left within reach [x] Nursing notified 
[x] Caregiver present [] Bed alarm activated COMMUNICATION/EDUCATION:  
The patients plan of care including findings, recommendations, and recommended diet changes were discussed with: Registered Nurse. [x] Patient/family have participated as able and agree with findings and recommendations. [] Patient is unable to participate in plan of care at this time. Thank you for this referral. 
Hannah Amaya SLP Time Calculation: 17 mins

## 2019-01-08 NOTE — ED NOTES
Patient resting comfortably in stretcher with call bell in reach, side rails x2. No further needs expressed.

## 2019-01-08 NOTE — H&P
Hospitalist Admission NoteNAME: Wayne Capps :  1960 MRN:  [id number] Date/Time:  2019 8:46 PM 
 
Patient PCP: Mehran Garrido MD 
______________________________________________________________________ Given the patient's current clinical presentation, I have a high level of concern for decompensation if discharged from the emergency department. Complex decision making was performed, which includes reviewing the patient's available past medical records, laboratory results, and x-ray films. My assessment of this patient's clinical condition and my plan of care is as follows. Assessment / Plan: CVA Transferred from hospitals for sm acute Rt occipital CVA Lipid panel A1C Neuro consult SLP/PT/OT consults ?bronchitis Presented to ED with sore throat Denies SOB Morbid obesity Lifestyle mods and wt loss advised LE lymphedema Wound care consult Cont torsemide and K+ supplementation CODP Quit smoking a few months ago Encouraged continued tobacco abstinence 
duonebs Hypothyroid Cont synthroid Code Status: Full Surrogate Decision Maker: sister [phone] DVT Prophylaxis: lovenox GI Prophylaxis: not indicated Subjective: CHIEF COMPLAINT: cough HISTORY OF PRESENT ILLNESS:    
Gokul Alegria is a 59 y.o. with PMHx IDDM, HTN, GERD, hypothyroidism, venous insufficiency/LE lymphadenopathy transferred form hospitals for new finding of small R occipital infarct and elevated troponin. The patient is able to give only a very limited Hx. She denies CP and states that she presented to hospitals for ongoing sore throat. She is a former heavy smoker and reports quitting smoking a few months ago. She denies fevers/chills, admits non-productive cough. She denies CP/N/V. No family available to provide additional Hx but patient denies one-sided weakness, speech changes. We were asked to admit for work up and evaluation of the above problems. Past Medical History:  
Diagnosis Date  Arthritis  Asthma  Bronchitis  Developmental delay  GERD (gastroesophageal reflux disease)  Hypercholesterolemia  Hypertension  Hypothyroid  IDDM (insulin dependent diabetes mellitus) ([facility] [address]  Morbid obesity ([facility] [address]  Peripheral neuropathy  Thyroid disease  Venous insufficiency Past Surgical History:  
Procedure Laterality Date  HX AMPUTATION Bilateral   
 toes  HX COLONOSCOPY   Social History Tobacco Use  Smoking status: Current Every Day Smoker Packs/day: 0.50 Types: Cigarettes Last attempt to quit: 2012 Years since quittin.0  Smokeless tobacco: Never Used Substance Use Topics  Alcohol use: Yes Comment: social  
  
 
Family History Problem Relation Age of Onset  COPD Father  Alcohol abuse Father No Known Allergies Prior to Admission medications Medication Sig Start Date End Date Taking? Authorizing Provider  
potassium chloride (K-DUR, KLOR-CON) 20 mEq tablet Take 20 mEq by mouth every other day. Alternates day with torsemide    Provider, Historical  
torsemide (DEMADEX) 20 mg tablet Take 20 mg by mouth every other day. Alternates administration w/ potassium supplement    Provider, Historical  
silver sulfADIAZINE (SILVADENE) 1 % topical cream Apply 1 Each to affected area every Monday, Wednesday, Friday. Apply to legs    Provider, Historical  
zinc 50 mg tab tablet Take 50 mg by mouth daily. 3/23/18   Provider, Historical  
insulin aspart U-100 (NOVOLOG FLEXPEN U-100 INSULIN) 100 unit/mL inpn 5 Units by SubCUTAneous route Before breakfast, lunch, and dinner. + SSI TIDAC: 
140-199=2 u           
200-249=3 u 
250-299=5 u 
300-349=8 u 
Patient taking differently: 5 Units by SubCUTAneous route Before breakfast, lunch, and dinner. Sliding Scale  
under none 150-200 5 units 201-250 10 units 251-300 15 units 301-350 20 units 351-400 25 units over 400 call MD 3/20/18   London Anderson MD  
levothyroxine (SYNTHROID) 150 mcg tablet Take 150 mcg by mouth Daily (before breakfast). Provider, Historical  
gabapentin (NEURONTIN) 300 mg capsule Take 600 mg by mouth nightly. Provider, Historical  
insulin glargine (LANTUS) 100 unit/mL injection 12 Units by SubCUTAneous route daily (after dinner). Patient takes at 1830 each day    Provider, Historical  
ipratropium-albuterol (COMBIVENT RESPIMAT)  mcg/actuation inhaler Take 1 Puff by inhalation every six (6) hours as needed for Shortness of Breath. Provider, Historical  
aspirin delayed-release 81 mg tablet Take 81 mg by mouth daily. Provider, Historical  
 
 
REVIEW OF SYSTEMS:    
I am not able to complete the review of systems because: The patient is intubated and sedated The patient has altered mental status due to his acute medical problems The patient has baseline aphasia from prior stroke(s) The patient has baseline dementia and is not reliable historian The patient is in acute medical distress and unable to provide information Total of 12 systems reviewed as follows:   
   POSITIVE= underlined text  Negative = text not underlined General:  fever, chills, sweats, generalized weakness, weight loss/gain,  
   loss of appetite Eyes:    blurred vision, eye pain, loss of vision, double vision ENT:    rhinorrhea, pharyngitis Respiratory:   cough, sputum production, SOB, RAMIREZ, wheezing, pleuritic pain  
Cardiology:   chest pain, palpitations, orthopnea, PND, edema, syncope Gastrointestinal:  abdominal pain , N/V, diarrhea, dysphagia, constipation, bleeding Genitourinary:  frequency, urgency, dysuria, hematuria, incontinence Muskuloskeletal :  arthralgia, myalgia, back pain Hematology:  easy bruising, nose or gum bleeding, lymphadenopathy Dermatological: rash, ulceration, pruritis, color change / jaundice Endocrine:   hot flashes or polydipsia Neurological:  headache, dizziness, confusion, focal weakness, paresthesia, Speech difficulties, memory loss, gait difficulty Psychological: Feelings of anxiety, depression, agitation Objective: VITALS:   
Visit Vitals /68 Pulse 86 Temp 98.4 °F (36.9 °C) Resp 18 SpO2 96% PHYSICAL EXAM: 
 
General:    Morbidly obese, NAD, moderately cooperative. HEENT: Atraumatic, anicteric sclerae, pink conjunctivae No oral ulcers, mucosa moist, throat clear, dentition fair Neck:  Supple, symmetrical,  thyroid: non tender Lungs:   Clear to auscultation bilaterally. No Wheezing or Rhonchi. No rales. Chest wall:  No tenderness  No Accessory muscle use. Heart:   Regular  rhythm,  No  murmur   No edema Abdomen:   Soft, non-tender. Not distended. Bowel sounds normal 
Extremities: No cyanosis. No clubbing,   
  Skin turgor normal, Capillary refill normal, Radial dial pulse 2+ Skin:     Not pale. Not Jaundiced  No rashes . Neurologic: EOMs intact. No facial asymmetry. No aphasia or slurred speech. Symmetrical strength, Sensation grossly intact. Alert and oriented X 4.  
 
_______________________________________________________________________ Care Plan discussed with: 
  Comments Patient x Family RN x Care Manager Consultant:     
_______________________________________________________________________ Expected  Disposition:  
Home with Family HH/PT/OT/RN   
SNF/LTC   
FAMILIA   
________________________________________________________________________ TOTAL TIME:  >60 Minutes Critical Care Provided     Minutes non procedure based Comments  
 x Reviewed previous records  
>50% of visit spent in counseling and coordination of care x Discussion with patient and/or family and questions answered 
  
 
________________________________________________________________________ Signed: Mily Murphy DO 
 
 Procedures: see electronic medical records for all procedures/Xrays and details which were not copied into this note but were reviewed prior to creation of Plan. LAB DATA REVIEWED:   
Recent Results (from the past 24 hour(s)) GLUCOSE, POC Collection Time: 01/07/19 10:26 AM  
Result Value Ref Range Glucose (POC) 96 65 - 100 mg/dL Performed by Cristal Bean CBC WITH AUTOMATED DIFF Collection Time: 01/07/19 11:38 AM  
Result Value Ref Range WBC 13.2 (H) 3.6 - 11.0 K/uL  
 RBC 5.95 (H) 3.80 - 5.20 M/uL  
 HGB 14.3 11.5 - 16.0 g/dL HCT 48.2 (H) 35.0 - 47.0 % MCV 81.0 80.0 - 99.0 FL  
 MCH 24.0 (L) 26.0 - 34.0 PG  
 MCHC 29.7 (L) 30.0 - 36.5 g/dL  
 RDW 16.2 (H) 11.5 - 14.5 % PLATELET 687 627 - 537 K/uL MPV 9.7 8.9 - 12.9 FL  
 NRBC 0.0 0  WBC ABSOLUTE NRBC 0.00 0.00 - 0.01 K/uL NEUTROPHILS 92 (H) 32 - 75 % LYMPHOCYTES 4 (L) 12 - 49 % MONOCYTES 4 (L) 5 - 13 % EOSINOPHILS 0 0 - 7 % BASOPHILS 0 0 - 1 % IMMATURE GRANULOCYTES 0 0.0 - 0.5 % ABS. NEUTROPHILS 12.2 (H) 1.8 - 8.0 K/UL  
 ABS. LYMPHOCYTES 0.5 (L) 0.8 - 3.5 K/UL  
 ABS. MONOCYTES 0.5 0.0 - 1.0 K/UL  
 ABS. EOSINOPHILS 0.0 0.0 - 0.4 K/UL  
 ABS. BASOPHILS 0.0 0.0 - 0.1 K/UL  
 ABS. IMM. GRANS. 0.0 0.00 - 0.04 K/UL  
 DF SMEAR SCANNED    
 RBC COMMENTS NORMOCYTIC, NORMOCHROMIC METABOLIC PANEL, COMPREHENSIVE Collection Time: 01/07/19 11:38 AM  
Result Value Ref Range Sodium 139 136 - 145 mmol/L Potassium 4.2 3.5 - 5.1 mmol/L Chloride 97 97 - 108 mmol/L  
 CO2 33 (H) 21 - 32 mmol/L Anion gap 9 5 - 15 mmol/L Glucose 126 (H) 65 - 100 mg/dL BUN 20 6 - 20 MG/DL Creatinine 1.21 (H) 0.55 - 1.02 MG/DL  
 BUN/Creatinine ratio 17 12 - 20 GFR est AA 55 (L) >60 ml/min/1.73m2 GFR est non-AA 45 (L) >60 ml/min/1.73m2 Calcium 9.6 8.5 - 10.1 MG/DL  Bilirubin, total 0.7 0.2 - 1.0 MG/DL  
 ALT (SGPT) 17 12 - 78 U/L  
 AST (SGOT) 20 15 - 37 U/L  
 Alk. phosphatase 131 (H) 45 - 117 U/L Protein, total 7.7 6.4 - 8.2 g/dL Albumin 3.6 3.5 - 5.0 g/dL Globulin 4.1 (H) 2.0 - 4.0 g/dL A-G Ratio 0.9 (L) 1.1 - 2.2    
TROPONIN I Collection Time: 01/07/19 11:38 AM  
Result Value Ref Range Troponin-I, Qt. 0.12 (H) <0.05 ng/mL LACTIC ACID Collection Time: 01/07/19 11:38 AM  
Result Value Ref Range Lactic acid 1.4 0.4 - 2.0 MMOL/L  
URINALYSIS W/ RFLX MICROSCOPIC Collection Time: 01/07/19 12:12 PM  
Result Value Ref Range Color YELLOW/STRAW Appearance CLEAR CLEAR Specific gravity 1.015 1.003 - 1.030    
 pH (UA) 6.5 5.0 - 8.0 Protein 100 (A) NEG mg/dL Glucose NEGATIVE  NEG mg/dL Ketone NEGATIVE  NEG mg/dL Bilirubin NEGATIVE  NEG Blood NEGATIVE  NEG Urobilinogen 0.2 0.2 - 1.0 EU/dL Nitrites NEGATIVE  NEG Leukocyte Esterase NEGATIVE  NEG    
URINE MICROSCOPIC ONLY Collection Time: 01/07/19 12:12 PM  
Result Value Ref Range WBC 0-4 0 - 4 /hpf  
 RBC 0-5 0 - 5 /hpf Epithelial cells FEW FEW /lpf Bacteria NEGATIVE  NEG /hpf  
EKG, 12 LEAD, INITIAL Collection Time: 01/07/19 12:43 PM  
Result Value Ref Range Ventricular Rate 94 BPM  
 Atrial Rate 94 BPM  
 P-R Interval 184 ms QRS Duration 108 ms Q-T Interval 384 ms QTC Calculation (Bezet) 480 ms Calculated P Axis 55 degrees Calculated R Axis 134 degrees Calculated T Axis 33 degrees Diagnosis Normal sinus rhythm Possible Left atrial enlargement Right bundle branch block Anteroseptal infarct , age undetermined Abnormal ECG When compared with ECG of 14-NOV-2018 03:48, 
MANUAL COMPARISON REQUIRED, DATA IS UNCONFIRMED 
  
GLUCOSE, POC Collection Time: 01/07/19  4:01 PM  
Result Value Ref Range Glucose (POC) 172 (H) 65 - 100 mg/dL Performed by Dhruv Bustamante (RN)

## 2019-01-08 NOTE — PROGRESS NOTES
Problem: Mobility Impaired (Adult and Pediatric) Goal: *Acute Goals and Plan of Care (Insert Text) Physical Therapy Goals Initiated 1/8/2019 1. Patient will move from supine to sit and sit to supine , scoot up and down and roll side to side in bed with independence within 7 day(s). 2.  Patient will transfer from bed to chair and chair to bed with independence using the least restrictive device within 7 day(s). 3.  Patient will perform sit to stand with independence within 7 day(s). 4.  Patient will ambulate with modified independence for 150 feet with the least restrictive device within 7 day(s). physical Therapy EVALUATION Patient: Daxa Jeong (25 y.o. female) Date: 1/8/2019 Primary Diagnosis: CVA (cerebral vascular accident) ([facility] [address] Precautions: fall ASSESSMENT : 
Based on the objective data described below, the patient presents with limitations in gait, functional mobility, and activity tolerance. Cleared by RN for session. Pt lives alone in an apt however she has a caregiver that comes M-F 9-2 and has boyfriend that stays otherwise during the days and overnight on weekends. Caregiver helps with IADLs and ADLs as needed. Pt amb mod I with RW baseline. At this time pt is mainly at a CGA level for bed mobility and transfers except for sit to supine needing min A of 2. She is on 4L NC in room and remained so during session. Sessions shortened due to pt readying to go to vascular for testing but pt able to amb with RW with CGA with shortened steps. Pt returned to bed with call bell in reach. Nurse yisel SHERMAN deferred due to shortened session. Note sats decrease to 87% with above activity. At this time, pt appears to be moving well but further assess will need to be completed with amb.  Suspect pt will be able to return home with HHPT and initial 24/7 S (which she nearly has) and progress to baseline level of S/Assist.  
 
 Patient will benefit from skilled intervention to address the above impairments. Patients rehabilitation potential is considered to be Good Factors which may influence rehabilitation potential include:  
[]         None noted 
[]         Mental ability/status [x]         Medical condition 
[]         Home/family situation and support systems 
[]         Safety awareness 
[]         Pain tolerance/management 
[]         Other: PLAN : 
Recommendations and Planned Interventions: 
[x]           Bed Mobility Training             []    Neuromuscular Re-Education 
[x]           Transfer Training                   []    Orthotic/Prosthetic Training 
[x]           Gait Training                         []    Modalities [x]           Therapeutic Exercises           []    Edema Management/Control 
[x]           Therapeutic Activities            [x]    Patient and Family Training/Education 
[]           Other (comment): Frequency/Duration: Patient will be followed by physical therapy  5 times a week to address goals. Discharge Recommendations: Home Health Further Equipment Recommendations for Discharge: TBD SUBJECTIVE:  
[facility].  OBJECTIVE DATA SUMMARY:  
HISTORY:   
Past Medical History:  
Diagnosis Date  Arthritis  Asthma  Bronchitis  Developmental delay  GERD (gastroesophageal reflux disease)  Hypercholesterolemia  Hypertension  Hypothyroid  IDDM (insulin dependent diabetes mellitus) ([facility] [address]  Morbid obesity ([facility] [address]  Peripheral neuropathy  Thyroid disease  Venous insufficiency Past Surgical History:  
Procedure Laterality Date  HX AMPUTATION Bilateral   
 toes  HX COLONOSCOPY  2015 Prior Level of Function/Home Situation: Pt lives alone in an apt however she has a caregiver that comes M-F 9-2 and has boyfriend that stays otherwise during the days and overnight on weekends.  Caregiver helps with IADLs and ADLs as needed. Pt amb mod I with RW baseline. Home Situation Home Environment: Apartment # Steps to Enter: 0 One/Two Story Residence: One story Living Alone: Yes Support Systems: Home care staff(caregiver M-F 9-2; IADLs, ADLs) Patient Expects to be Discharged to[de-identified] [facility] Current DME Used/Available at Home: Grab bars, Shower chair, Walker, rollator, Walker, rolling, Cane, straight, Raised toilet seat Tub or Shower Type: Shower EXAMINATION/PRESENTATION/DECISION MAKING: Critical Behavior: 
Neurologic State: Alert Orientation Level: Oriented X4 Cognition: Follows commands Safety/Judgement: Awareness of environment Range Of Motion: 
AROM: Within functional limits PROM: Within functional limits Strength:   
Strength: Generally decreased, functional 
  
  
  
  
  
  
Tone & Sensation:  
Tone: Normal 
  
  
  
  
Sensation: Intact Coordination: 
Coordination: Within functional limits Functional Mobility: 
Bed Mobility: 
  
Supine to Sit: Contact guard assistance Sit to Supine: Minimum assistance;Assist x2 Transfers: 
Sit to Stand: Contact guard assistance Stand to Sit: Contact guard assistance Balance:  
Sitting: Intact Standing: Impaired Standing - Static: Fair(with RW) Standing - Dynamic : Fair(with RW)Ambulation/Gait Training:Distance (ft): 5 Feet (ft) Assistive Device: Gait belt;Walker, rolling Ambulation - Level of Assistance: Contact guard assistance Gait Abnormalities: Decreased step clearance Speed/Diana: Slow;Shuffled Step Length: Right shortened;Left shortened Functional Measure: 
Barthel Index: 
 
Bathin Bladder: 0 Bowels: 10 
Groomin Dressin Feeding: 10 Mobility: 0(limited by distance) Stairs: 0 Toilet Use: 5 Transfer (Bed to Chair and Back): 10 Total: 45 The Barthel ADL Index: Guidelines 1. The index should be used as a record of what a patient does, not as a record of what a patient could do. 2. The main aim is to establish degree of independence from any help, physical or verbal, however minor and for whatever reason. 3. The need for supervision renders the patient not independent. 4. A patient's performance should be established using the best available evidence. Asking the patient, friends/relatives and nurses are the usual sources, but direct observation and common sense are also important. However direct testing is not needed. 5. Usually the patient's performance over the preceding 24-48 hours is important, but occasionally longer periods will be relevant. 6. Middle categories imply that the patient supplies over 50 per cent of the effort. 7. Use of aids to be independent is allowed. Barbara Fabian., Barthel, D.W. ([id number]). Functional evaluation: the Barthel Index. [address] [facility] (14)2. Jo Valle lena ELLI Bello, Gretchen Masters., Brittany Pickering., Gail, [address] [place] [address] Ave (1999). Measuring the change indisability after inpatient rehabilitation; comparison of the responsiveness of the Barthel Index and Functional [place] Measure. Journal of Neurology, Neurosurgery, and Psychiatry, 66(4), 431-517. Matt Sinclair, N.J.A, LINDA Clarke, & Juan Bence, M.A. (2004.) Assessment of post-stroke quality of life in cost-effectiveness studies: The usefulness of the Barthel Index and the EuroQoL-5D. [facility], 13, 111-92 Physical Therapy Evaluation Charge Determination History Examination Presentation Decision-Making HIGH Complexity :3+ comorbidities / personal factors will impact the outcome/ POC  MEDIUM Complexity : 3 Standardized tests and measures addressing body structure, function, activity limitation and / or participation in recreation  MEDIUM Complexity : Evolving with changing characteristics  MEDIUM Complexity : FOTO score of 26-74 Based on the above components, the patient evaluation is determined to be of the following complexity level: MEDIUM Pain: 
Pain Scale 1: Numeric (0 - 10) Pain Intensity 1: 10 
Pain Location 1: Back Pain Orientation 1: Posterior Pain Description 1: Constant Activity Tolerance:  
See above narrative Please refer to the flowsheet for vital signs taken during this treatment. After treatment:  
[]         Patient left in no apparent distress sitting up in chair 
[x]         Patient left in no apparent distress in bed 
[x]         Call bell left within reach [x]         Nursing notified 
[]         Caregiver present 
[]         Bed alarm activated COMMUNICATION/EDUCATION:  
The patients plan of care was discussed with: Occupational Therapist and Registered Nurse. [x]         Fall prevention education was provided and the patient/caregiver indicated understanding. [x]         Patient/family have participated as able in goal setting and plan of care. [x]         Patient/family agree to work toward stated goals and plan of care. []         Patient understands intent and goals of therapy, but is neutral about his/her participation. []         Patient is unable to participate in goal setting and plan of care. Thank you for this referral. 
Annie Thakur, PT Time Calculation: 27 mins

## 2019-01-08 NOTE — ED NOTES
Pt repositioned in bed, linens rearranged. Pt placed on purwick. No further needs expressed at this time.

## 2019-01-08 NOTE — ED NOTES
Assumed care of patient from Charlette Cabral., RN. Patient resting comfortably on stretcher with call bell within reach. Patient's stretcher in lowest position, with side rails up x2. Patient on the monitor x3. Patient updated on plan of care at this time. Patient's belongings are in close proximity. Patient assessed for all personal needs that may be completed by RN. No further needs noted at this time.

## 2019-01-08 NOTE — PROGRESS NOTES
Physical Therapy Received eval order, chart reviewed. Pt MANUELA for MRI. Will follow up at later time as pt is available and appropriate.  
 
Lizabeth Jeong, PT

## 2019-01-08 NOTE — PROGRESS NOTES
CM consulted re: POA paperwork. CM met with pt's sister Martin Fresnomty- [phone]) re: POA questions. CM introduced self, role. Samantha Reyes verbalized understanding. Samantha Reyes stated she is the POA for the patient and would like to change the code status of the pt from full code to DNR. Samantha Reyes did not bring POA paperwork at this time. CM offered education re: POA paperwork to include being unable to change status if POA is not produced. Samantha Reyes verbalized understanding. Samantha Reyes stated she would fax the paperwork tomorrow, 1/9/19, if she is able to produce the paperwork. CM offered CM fax number to fax documents to. CM called pt's PCP office and left a message with medical records re: POA paperwork. CM left direct contact information for return call. CM attempted to call Attending re: above information with no answer. CM will continue to reach out to Attending re: POA documentation, plan of care. CM will continue to remain available for support, discharge planning as needed. Halley Rodríguez, MSW, LSW Supervisee in Social Work [facility] [phone]

## 2019-01-08 NOTE — PROGRESS NOTES
1050 assumed care of patient, VSS, patient resting comfortably, call bell provided, verbalizes no complaints 1107 Patient to MRI

## 2019-01-08 NOTE — CONSULTS
[address] [place] [address] [facility]     Date of  Admission: 1/7/2019  6:03 PM     Admission type:Emergency    Consult for: elevated troponin  Consult by: hospitalist      Subjective:     Mando Enriquez is a 59 y.o. female with PMH HTN, HLD, hypothyroid, GERD, venous insufficiency, arthritis, developmental delay who was admitted for CVA (cerebral vascular accident) [facility])     Per ED provider note, Ms. Lizama transferred from [facility] for CVA. Cardiology consulted for elevated troponin of 0.12/0.36. On assessment, Ms. Lizama kept her eyes closed. She states she came to the hospital for n/v. She also c/o a sore throat, being hungry, back pain \"from falling\", and leg weakness. She is sometimes SOB and has some leg swelling. She denies chest pain, palpitations, diarrhea. Ms. Cruzito Paulino recently had a cardiac cath with Dr. Nadeen Fuentes 11/18 which showed no significant CAD. ECHO same admission with EF 55-60%; NWMA; RV midly dilated; SABINE; IVC dilated.           Patient Active Problem List    Diagnosis Date Noted    Elevated troponin 01/08/2019    CVA (cerebral vascular accident) ([address] 01/07/2019    COPD exacerbation ([address] 11/14/2018    NSTEMI (non-ST elevated myocardial infarction) ([address] 11/14/2018    Acute respiratory failure with hypoxia ([address] 11/14/2018    DKA (diabetic ketoacidoses) ([address] 03/15/2018    ANN (acute kidney injury) ([address] 03/15/2018    Venous stasis dermatitis 04/25/2014    Venous stasis syndrome 04/25/2014    IDDM (insulin dependent diabetes mellitus) ([address]     Morbid obesity (HCC)     Venous insufficiency       María Hilliard MD  Past Medical History:   Diagnosis Date    Arthritis     Asthma     Bronchitis     Developmental delay     GERD (gastroesophageal reflux disease)     Hypercholesterolemia     Hypertension     Hypothyroid     IDDM (insulin dependent diabetes mellitus) ([address]     Morbid obesity ([address]     Peripheral neuropathy     Thyroid disease     Venous insufficiency       Social History     Socioeconomic History    Marital status: SINGLE     Spouse name: Not on file    Number of children: Not on file    Years of education: Not on file    Highest education level: Not on file   Tobacco Use    Smoking status: Current Every Day Smoker     Packs/day: 0.50     Types: Cigarettes     Last attempt to quit: 2012     Years since quittin.0    Smokeless tobacco: Never Used   Substance and Sexual Activity    Alcohol use: Yes     Comment: social    Drug use: No     No Known Allergies   Family History   Problem Relation Age of Onset    COPD Father     Alcohol abuse Father       Current Facility-Administered Medications   Medication Dose Route Frequency    gabapentin (NEURONTIN) capsule 600 mg  600 mg Oral QHS    levothyroxine (SYNTHROID) tablet 150 mcg  150 mcg Oral ACB    albuterol-ipratropium (DUO-NEB) 2.5 MG-0.5 MG/3 ML  3 mL Nebulization Q6H PRN    guaiFENesin (ROBITUSSIN) 100 mg/5 mL oral liquid 100 mg  100 mg Oral Q6H PRN    insulin lispro (HUMALOG) injection   SubCUTAneous AC&HS    glucose chewable tablet 16 g  4 Tab Oral PRN    dextrose (D50W) injection syrg 12.5-25 g  12.5-25 g IntraVENous PRN    glucagon (GLUCAGEN) injection 1 mg  1 mg IntraMUSCular PRN    albuterol-ipratropium (DUO-NEB) 2.5 MG-0.5 MG/3 ML  3 mL Nebulization QID RT    labetalol (NORMODYNE;TRANDATE) injection 5 mg  5 mg IntraVENous Q10MIN PRN    sodium chloride (NS) flush 5-40 mL  5-40 mL IntraVENous Q8H    sodium chloride (NS) flush 5-40 mL  5-40 mL IntraVENous PRN    acetaminophen (TYLENOL) tablet 650 mg  650 mg Oral Q4H PRN    Or    acetaminophen (TYLENOL) solution 650 mg  650 mg Per NG tube Q4H PRN    Or    acetaminophen (TYLENOL) suppository 650 mg  650 mg Rectal Q4H PRN    aspirin chewable tablet 81 mg  81 mg Oral DAILY    enoxaparin (LOVENOX) injection 40 mg  40 mg SubCUTAneous Q12H Current Outpatient Medications   Medication Sig    potassium chloride (K-DUR, KLOR-CON) 20 mEq tablet Take 20 mEq by mouth every other day. Alternates day with torsemide    torsemide (DEMADEX) 20 mg tablet Take 20 mg by mouth every other day. Alternates administration w/ potassium supplement    silver sulfADIAZINE (SILVADENE) 1 % topical cream Apply 1 Each to affected area every Monday, Wednesday, Friday. Apply to legs    zinc 50 mg tab tablet Take 50 mg by mouth daily.  insulin aspart U-100 (NOVOLOG FLEXPEN U-100 INSULIN) 100 unit/mL inpn 5 Units by SubCUTAneous route Before breakfast, lunch, and dinner. + SSI TIDAC:  140-199=2 u            200-249=3 u  250-299=5 u  300-349=8 u (Patient taking differently: 5 Units by SubCUTAneous route Before breakfast, lunch, and dinner. Sliding Scale   under none  150-200 5 units  201-250 10 units  251-300 15 units  301-350 20 units  351-400 25 units  over 400 call MD)    levothyroxine (SYNTHROID) 150 mcg tablet Take 150 mcg by mouth Daily (before breakfast).  gabapentin (NEURONTIN) 300 mg capsule Take 600 mg by mouth nightly.  insulin glargine (LANTUS) 100 unit/mL injection 12 Units by SubCUTAneous route daily (after dinner). Patient takes at 1830 each day    ipratropium-albuterol (COMBIVENT RESPIMAT)  mcg/actuation inhaler Take 1 Puff by inhalation every six (6) hours as needed for Shortness of Breath.  aspirin delayed-release 81 mg tablet Take 81 mg by mouth daily.         Review of Symptoms:   11 systems reviewed, negative other than as stated in the HPI        Objective:      Visit Vitals  /90 (BP 1 Location: Left arm, BP Patient Position: At rest)   Pulse 79   Temp 98.3 °F (36.8 °C)   Resp 19   SpO2 97%       Physical:   General:  female resting on stretcher in NAD  Heart: RRR, no m/S3/JVD  Lungs: clear/dim   Abdomen: Soft, +BS, NTND   Extremities: LE cliff +DP/PT, trace to 1+ edema BLE with ACE wrap BLE  Neurologic: Grossly normal. Wouldn't open eyes. Data Review:   Recent Labs     01/08/19  0404 01/07/19  1138   WBC 10.5 13.2*   HGB 12.2 14.3   HCT 40.6 48.2*    264     Recent Labs     01/08/19  0404 01/07/19  1138    139   K 4.0 4.2   CL 97 97   CO2 32 33*   * 126*   BUN 27* 20   CREA 1.67* 1.21*   CA 8.7 9.6   ALB  --  3.6   TBILI  --  0.7   SGOT  --  20   ALT  --  17       Recent Labs     01/08/19  0100 01/07/19  1138   TROIQ 0.36* 0.12*       No intake or output data in the 24 hours ending 01/08/19 1035     Cardiographics    Telemetry: SR with occ PVCs  ECG: unable to view EKG from [facility]. Read as NSR with RBBB and age undetermined anteroseptal infarct  Echocardiogram:  Last as above  CXRAY: \"Mild bibasilar opacities may represent atelectasis but infection is  not clinical setting. Follow-up PA and lateral chest radiographs are suggested  when the patient is able and if clinically warranted. \"  MRI brain:  \"1. Small acute right occipital lobe infarct.   2. Mild chronic microvascular ischemic changes in the periventricular white  Matter. \"       Assessment:       Active Problems:    ANN (acute kidney injury) ([facility] (3/15/2018)      CVA (cerebral vascular accident) ([facility] (1/7/2019)      Elevated troponin (1/8/2019)         Plan:     Rahul Seanz is a 59 y.o. female who was transferred from [facility] with CVA. Cardiology consulted for elevated troponin  0.12/0. 36. Creatinine 1.21/1.67  · No significant ectopy noted on tele. Continue to monitor  · Mild elevation in troponin in setting of CVA and ANN. Had higher troponin during 11/18 admission, with a cath that showed non-obstructive CAD. · No need for any additional troponin checks, no indication for further ischemic work up. · Continue ASA  · Torsemide currently on hold for kidney function. Patient may be slightly dehydrated from vomiting. Patient likely takes for her venous insufficiency.       Regarding CVA and risk factors for atrial fibrillation:  · Monitor on telemetry. · If no atrial fibrillation noted on telemetry prior to discharge, and no strongly suspected because of CVA, could consider an implanted loop monitor. I believe an implanted loop monitor would be of benefit to the patient as implanted loop increased detection of PAF from 2 to 12% over standard medical monitoring in cryptogenic CVA at 1 year ([facility] 2014). Thank you for consulting [facility]    Juana Valiente, MILLIE  DNP, RN, [facility]    Patient seen and examined by me with nurse practitioner Juana Valiente. I personally performed all components of the history, physical, and medical decision making and agree with the assessment and plan with minor modifications as noted. Prior catheterization for higher troponin elevation was negative for significant CAD. Monitor for atrial fibrillation or other causes of CVA. Consider implanted loop monitor as noted above if no obvious cause of stroke. Thanks for the consult. I appreciate the opportunity to participate in this patient's care. Dr. Tamika Devine will follow with you starting in the morning.

## 2019-01-08 NOTE — WOUND CARE
Wound care nurse consult from dr Vivien Eng stating \" lower extremity lymphedema\". Patient is a 60 y/o CF admitted for CVA, transferred from [facility] for neuro. Patient has BLE lymphedema with poorly applied lymphedema wraps on. Patient seen in ED. Past Medical History:  
Diagnosis Date  Arthritis  Asthma  Bronchitis  Developmental delay  GERD (gastroesophageal reflux disease)  Hypercholesterolemia  Hypertension  Hypothyroid  IDDM (insulin dependent diabetes mellitus) ([facility]  Morbid obesity ([facility]  Peripheral neuropathy  Thyroid disease  Venous insufficiency Patient was unwrapped and she does not have any wounds, she has chronic skin changes to BLE from chronic edema, but no cellulitis. When [facility] nurse tried to apply her wraps correctly she said I did thenm wrong and too tight. \"I do not like them on my feet or ankles\". Patient has legs elevated and she states she has a visiting \"nurse\" who applies her wraps. Recommend: BLE lympadema wraps: DO NOT throw away or replace with ace wraps. Send home with patient. . Patient needs legs washed weekly and lotion applied before wrapping them around her lower legs. Keep legs elevated.  
 
Diane Teresa RN, [employer]

## 2019-01-08 NOTE — PROGRESS NOTES
Problem: Self Care Deficits Care Plan (Adult) Goal: *Acute Goals and Plan of Care (Insert Text) Occupational Therapy Goals Initiated 1/8/2019 1. Patient will perform grooming with supervision/set-up within 7 day(s). 2.  Patient will perform bathing at the sink with supervision/set-up within 7 day(s). 3.  Patient will perform lower body dressing with supervision/set-up within 7 day(s). 4.  Patient will perform toilet transfers with modified independence within 7 day(s). 5.  Patient will perform all aspects of toileting with independence within 7 day(s). 6.  Patient will participate in upper extremity therapeutic exercise/activities with independence for 5 minutes within 7 day(s). 7.  Patient will utilize energy conservation techniques during functional activities with verbal cues within 7 day(s). Occupational Therapy EVALUATION Patient: Kong Harris (80 y.o. female) Date: 1/8/2019 Primary Diagnosis: CVA (cerebral vascular accident) [address] Precautions: f   
 
ASSESSMENT : 
Based on the objective data described below, the patient  presents with  generalized weakness, decreased endurance, functional mobility, and ADLs. Cleared by RN for session. Pt lives alone in an apt however she has a caregiver that comes M-F 9-2 and has boyfriend that stays otherwise during the days and overnight on weekends. Caregiver helps with IADLs and ADLs as needed. Pt amb mod I with RW baseline. Pt was supine on stretcher and was on 4 liters of NC and with movement her O2% dropped to 87%. She  has functional range and strength in BUE and scored 66/66 on the [facility]. Pt was min assist of 1 for bed mobility and able to come to a stand with CGA and needed to hold to RW while the sheets were being changed. Pt was able to side step to the [facility] with CGA and VC, and was Min assist for sit to supine to stretcher.   Pt states that she was able to bathe and dress self PTA with her caregiver there for safety. Recommend that pt have further therapy at discharge with home care OT and PT, and 24/7 assist . Patient will benefit from skilled intervention to address the above impairments. Patients rehabilitation potential is considered to be Good Factors which may influence rehabilitation potential include:  
[x]             None noted []             Mental ability/status []             Medical condition []             Home/family situation and support systems []             Safety awareness []             Pain tolerance/management 
[]             Other: PLAN : 
Recommendations and Planned Interventions: 
[x]               Self Care Training                  []        Therapeutic Activities [x]               Functional Mobility Training    []        Cognitive Retraining 
[x]               Therapeutic Exercises           [x]        Endurance Activities [x]               Balance Training                   []        Neuromuscular Re-Education []               Visual/Perceptual Training     [x]   Home Safety Training 
[x]               Patient Education                 [x]        Family Training/Education []               Other (comment): Frequency/Duration: Patient will be followed by occupational therapy 5 times a week to address goals. Discharge Recommendations: Home care OT and PT with 24/7 assist  
Further Equipment Recommendations for Discharge: tbd SUBJECTIVE:  
Patient stated I feel good now. I'm ready to get up. Laura Folds OBJECTIVE DATA SUMMARY:  
HISTORY:  
Past Medical History:  
Diagnosis Date  Arthritis  Asthma  Bronchitis  Developmental delay  GERD (gastroesophageal reflux disease)  Hypercholesterolemia  Hypertension  Hypothyroid  IDDM (insulin dependent diabetes mellitus) ([address]  Morbid obesity ([address]  Peripheral neuropathy  Thyroid disease  Venous insufficiency Past Surgical History: Procedure Laterality Date  HX AMPUTATION Bilateral   
 toes  HX COLONOSCOPY  2015 Prior Level of Function/Environment/Context: pt lives alone and has a caregiver that come 5 days a week, 9-2, but reports that she is able to bathe and dress self with no assist from the caregiver Occupations in which the patient is/was successful, what are the barriers preventing that success:  
Performance Patterns (routines, roles, habits, and rituals):  
Personal Interests and/or values:  
Expanded or extensive additional review of patient history:  
 
Home Situation Home Environment: Apartment # Steps to Enter: 0 One/Two Story Residence: One story Living Alone: Yes Support Systems: Home care staff(caregiver M-F 9-2; IADLs, ADLs) Patient Expects to be Discharged to[de-identified] ZOBFY Current DME Used/Available at Home: Grab bars, Shower chair, Walker, rollator, Walker, rolling, Cane, straight, Raised toilet seat Tub or Shower Type: Shower Hand dominance: RightEXAMINATION OF PERFORMANCE DEFICITS: 
Cognitive/Behavioral Status: 
Neurologic State: Alert Orientation Level: Oriented X4(periods of confusion) Cognition: Follows commands Perception: Appears intact Perseveration: No perseveration noted Safety/Judgement: Awareness of environment Skin: in good health Edema: none noted Hearing: 
  
Vision/Perceptual:   
    
    
   intact Range of Motion: 
 
AROM: Within functional limits PROM: Within functional limits Strength: 
 
Strength: Generally decreased, functional 
  
  
  
  
Coordination: 
Coordination: Within functional limits Fine Motor Skills-Upper: Left Intact; Right Intact Gross Motor Skills-Upper: Left Intact; Right Intact Tone & Sensation: 
 
Tone: Normal 
Sensation: Intact Balance: 
Sitting: Intact Standing: Impaired Standing - Static: Fair(with RW) Standing - Dynamic : Fair(with RW) Functional Mobility and Transfers for ADLs:Bed Mobility: 
Supine to Sit: Contact guard assistance Sit to Supine: Minimum assistance;Assist x2 Transfers: 
Sit to Stand: Contact guard assistance Stand to Sit: Contact guard assistance ADL Assessment: 
Feeding: Independent Oral Facial Hygiene/Grooming: Independent Bathing: Minimum assistance;Contact guard assistance Upper Body Dressing: Contact guard assistance Lower Body Dressing: Minimum assistance Toileting: Minimum assistance;Contact guard assistance Cognitive Retraining Safety/Judgement: Awareness of environment Functional Measure: 
Fugl-Marshall Assessment of Motor Recovery after Stroke:  
 
Reflex Activity Flexors/Biceps/Fingers: Can be elicited Extensors/Triceps: Can be elicited Reflex Subtotal: 4 Volitional Movement Within Synergies Shoulder Retraction: Full Shoulder Elevation: Full Shoulder Abduction (90 degrees): Full Shoulder External Rotation: Full Elbow Flexion: Full Forearm Supination: Full Shoulder Adduction/Internal Rotation: Full Elbow Extension: Full Forearm Pronation: Full Subtotal: 18 
 
Volitional Movement Mixing Synergies Hand to Lumbar Spine: Full Shoulder Flexion (0-90 degrees): Full Pronation-Supination: Full Subtotal: 6 Volitional Movement With Little or No Synergy Shoulder Abduction (0-90 degrees): Full Shoulder Flexion ( degrees): Full Pronation/Supination: Full Subtotal : 6 Normal Reflex Activity Biceps, Triceps, Finger Flexors: Full Subtotal : 2 Upper Extremity Total  
Upper Extremity Total: 36 Wrist 
Stability at 15 Degree Dorsiflexion: Full Repeated Dorsiflexion/ Volar Flexion: Full Stability at 15 Degree Dorsiflexion: Full Repeated Dorsiflexion/ Volar Flexion: Full Circumduction: Full Wrist Total: 10 Hand Mass Flexion: Full Mass Extension: Full Grasp A: Full Grasp B: Full Grasp C: Full Grasp D: Full Grasp E: Full Hand Total: 14 
 
 Coordination/Speed Tremor: None Dysmetria: None Time: <1s Coordination/Speed Total : 6 Total A-D Total A-D (Motor Function): 79/84 This is a reliable/valid measure of arm function after a neurological event. It has established value to characterize functional status and for measuring spontaneous and therapy-induced recovery; tests proximal and distal motor functions. Fugl-Marshall Assessment  UE scores recorded between five and 30 days post neurologic event can be used to predict UE recovery at six months post neurologic event. Severe = 0-21 points Moderately Severe = 22-33 points Moderate = 34-47 points Mild = 48-66 points MALCOM Gallegos, DARCI De Paz, & LINDA Menjivar (1992). Measurement of motor recovery after stroke: Outcome assessment and sample size requirements. Stroke, 23, pp. 3037-3476.  
--------------------------------------------------------------------------------------------------------------------------------------------------------------------MCID: 
Stroke: Isaura Robles et al, 2001; n = 171; mean age 79 (6) years; assessed within 16 (12) days of stroke, Acute Stroke) FMA Motor Scores from Admission to Discharge 10 point increase in FMA Upper Extremity = 1.5 change in discharge FIM  
10 point increase in FMA Lower Extremity = 1.9 change in discharge FIM 
MDC:  
Stroke:  
Radha Webster et al, 2008, n = 14, mean age = 59.9 (14.6) years, assessed on average 14 (6.5) months post stroke, Chronic Stroke) FMA = 5.2 points for the Upper Extremity portion of the assessment Occupational Therapy Evaluation Charge Determination History Examination Decision-Making MEDIUM Complexity : Expanded review of history including physical, cognitive and psychosocial  history  LOW Complexity : 1-3 performance deficits relating to physical, cognitive , or psychosocial skils that result in activity limitations and / or participation restrictions  LOW Complexity : No comorbidities that affect functional and no verbal or physical assistance needed to complete eval tasks Based on the above components, the patient evaluation is determined to be of the following complexity level: LOW Pain: 
Pain Scale 1: Numeric (0 - 10) Pain Intensity 1: 0 Pain Location 1: Back Pain Orientation 1: Posterior Pain Description 1: Constant Activity Tolerance:  
good Please refer to the flowsheet for vital signs taken during this treatment. After treatment:  
[] Patient left in no apparent distress sitting up in chair 
[x] Patient left in no apparent distress in bed 
[x] Call bell left within reach [x] Nursing notified 
[x] Caregiver present 
[] Bed alarm activated COMMUNICATION/EDUCATION:  
The patients plan of care was discussed with: Physical Therapist, Registered Nurse and family. [x] Home safety education was provided and the patient/caregiver indicated understanding. [x] Patient/family have participated as able in goal setting and plan of care. [x] Patient/family agree to work toward stated goals and plan of care. [] Patient understands intent and goals of therapy, but is neutral about his/her participation. [] Patient is unable to participate in goal setting and plan of care. This patients plan of care is appropriate for delegation to [facility]. Thank you for this referral. 
[facility] OT Time Calculation: 27 mins

## 2019-01-08 NOTE — PROGRESS NOTES
1600 notified Dr. Gabe Leyva of trop 0.26, no orders, trop checks can be d/c'd. TRANSFER - OUT REPORT: 
 
Verbal report given to Mily(name) on Darnell Hay  being transferred to NSTU(unit) for routine progression of care Report consisted of patients Situation, Background, Assessment and  
Recommendations(SBAR). Information from the following report(s) SBAR, Kardex, [facility] and Recent Results was reviewed with the receiving nurse. Lines:  
Peripheral IV 01/07/19 Right Antecubital (Active) Site Assessment Clean, dry, & intact 1/7/2019  7:30 PM  
Phlebitis Assessment 0 1/7/2019  7:30 PM  
Infiltration Assessment 0 1/7/2019  7:30 PM  
Dressing Status Clean, dry, & intact 1/7/2019  7:30 PM  
Dressing Type Transparent 1/7/2019  7:30 PM  
Hub Color/Line Status Green;Flushed 1/7/2019  7:30 PM  
   
Peripheral IV 01/07/19 Right Wrist (Active) Site Assessment Clean, dry, & intact 1/7/2019  7:30 PM  
Phlebitis Assessment 0 1/7/2019  7:30 PM  
Infiltration Assessment 0 1/7/2019  7:30 PM  
Dressing Status Clean, dry, & intact 1/7/2019  7:30 PM  
Dressing Type Transparent 1/7/2019  7:30 PM  
Hub Color/Line Status Blue;Flushed 1/7/2019  7:30 PM  
  
 
Opportunity for questions and clarification was provided. Patient transported with: 
 [employer]

## 2019-01-08 NOTE — PROCEDURES
[facility]  *** FINAL REPORT ***    Name: Carla Becerra  MRN: [id number]    Inpatient  : 01 Dec 1960  HIS Order #: [id number]  [id number] [facility] Visit #: [id number]  Date: 2019    TYPE OF TEST: Cerebrovascular Duplex    REASON FOR TEST  Stroke    Right Carotid:-             Proximal               Mid                 Distal  cm/s  Systolic  Diastolic  Systolic  Diastolic  Systolic  Diastolic  CCA:     59.9       9.0                            88.0      14.0  Bulb:  ECA:     97.0       0.0  ICA:     60.0       8.0       64.0      15.0       52.0      14.0  ICA/CCA:  0.7       0.6    ICA Stenosis: Normal    Right Vertebral:-  Finding: Antegrade  Sys:       51.0  Jennie:       13.0    Right Subclavian: Normal    Left Carotid:-            Proximal                Mid                 Distal  cm/s  Systolic  Diastolic  Systolic  Diastolic  Systolic  Diastolic  CCA:     56.6      14.0                            81.0       6.0  Bulb:  ECA:     82.0       0.0  ICA:     77.0      16.0       65.0      17.0       70.0      14.0  ICA/CCA:  1.0       2.7    ICA Stenosis: Normal    Left Vertebral:-  Finding: Antegrade  Sys:       60.0  Jennie:       12.0    Left Subclavian: Normal    INTERPRETATION/FINDINGS  PROCEDURE:  Carotid Duplex Examination using B-mode, color and  spectral Doppler of the extracranial cerebrovascular arteries. 1. No evidence of significant arterial occlusive disease in the  internal carotid arteries. 2. No significant stenosis in the external carotid arteries  bilaterally. 3. Antegrade flow in both vertebral arteries. 4. Normal flow in both subclavian arteries. ADDITIONAL COMMENTS    I have personally reviewed the data relevant to the interpretation of  this  study. TECHNOLOGIST: Morgan Navarro. DAYSI Montano, RDMS  Signed: 2019 04:07 PM    PHYSICIAN: Marvel Cheney MD  Signed: 2019 04:29 PM

## 2019-01-08 NOTE — ED PROVIDER NOTES
EMERGENCY DEPARTMENT HISTORY AND PHYSICAL EXAM 
 
 
Date: 2019 Patient Name: Allan Skinner History of Presenting Illness Chief Complaint Patient presents with  
 Headache History Provided By: Patient and EMS 
 
HPI: Allan Skinner, 59 y.o. female with PMHx significant for developmental delay, IDDM, HTN, GERD, hypothyroid, venous insufficiency, presents as transfer from [facility] to the ED with cc of HA, n/v, and cough for unknown onset. Pt arrived at [facility] uncooperative. Pt was transferred from [facility] to here due to MRI of brain showing small acute R occipital lobe infarct, CXR suggesting PNA, trop = -0.12, and WBC = 13. Pt denies any weakness or fever. She denies using O2 at home. Hx limited due to hx of developmental delay. PCP: Jacqueline Martinez MD 
 
Social Hx:  
Social History Tobacco Use Smoking Status Current Every Day Smoker  Packs/day: 0.50  Types: Cigarettes  Last attempt to quit: 2012  Years since quittin.0 Smokeless Tobacco Never Used  
,  
Social History Substance and Sexual Activity Alcohol Use Yes Comment: social  
, Social History Substance and Sexual Activity Drug Use No  
 
 
Past History Past Surgical History: 
Past Surgical History:  
Procedure Laterality Date  HX AMPUTATION Bilateral   
 toes  HX COLONOSCOPY   Family History: 
Family History Problem Relation Age of Onset  COPD Father  Alcohol abuse Father Allergies: 
No Known Allergies Review of Systems Review of Systems Unable to perform ROS: Other (developmental delay) Physical Exam  
Physical Exam  
Constitutional: She is oriented to person, place, and time. She appears well-developed and well-nourished. HENT:  
Head: Normocephalic and atraumatic. Mouth/Throat: Oropharynx is clear and moist. Mucous membranes are dry. Eyes: Conjunctivae and EOM are normal. Pupils are equal, round, and reactive to light. Neck: Normal range of motion. Neck supple. No tracheal deviation present. Cardiovascular: Normal rate, regular rhythm, normal heart sounds and intact distal pulses. No murmur heard. Pulmonary/Chest: Effort normal and breath sounds normal. No respiratory distress. She has no wheezes. She has no rales. Abdominal: Soft. Bowel sounds are normal. There is no tenderness. There is no rebound and no guarding. Musculoskeletal: She exhibits no edema or deformity. Neurological: She is alert and oriented to person, place, and time. GCS eye subscore is 4. GCS verbal subscore is 5. GCS motor subscore is 6. EOMI intact, no facial droop or asymmetry, normal/equal sensation in face. Uvula elevates at midline, no tongue deviation. Normal strength with head rotation and shoulder shrug. 5/5 Strength in the bilateral upper and lower extremities, no pronator drift, normal finger to nose. No truncal ataxia. Normal speech: no dysarthria or aphasia. Moving all extremities. Skin: Skin is warm and dry. Psychiatric: She has a normal mood and affect. Her speech is normal.  
Nursing note and vitals reviewed. Diagnostic Study Results Labs - Recent Results (from the past 12 hour(s)) GLUCOSE, POC Collection Time: 01/07/19 10:26 AM  
Result Value Ref Range Glucose (POC) 96 65 - 100 mg/dL Performed by Harpreet Hartmann CBC WITH AUTOMATED DIFF Collection Time: 01/07/19 11:38 AM  
Result Value Ref Range WBC 13.2 (H) 3.6 - 11.0 K/uL  
 RBC 5.95 (H) 3.80 - 5.20 M/uL  
 HGB 14.3 11.5 - 16.0 g/dL HCT 48.2 (H) 35.0 - 47.0 % MCV 81.0 80.0 - 99.0 FL  
 MCH 24.0 (L) 26.0 - 34.0 PG  
 MCHC 29.7 (L) 30.0 - 36.5 g/dL  
 RDW 16.2 (H) 11.5 - 14.5 % PLATELET 326 319 - 237 K/uL MPV 9.7 8.9 - 12.9 FL  
 NRBC 0.0 0  WBC ABSOLUTE NRBC 0.00 0.00 - 0.01 K/uL NEUTROPHILS 92 (H) 32 - 75 % LYMPHOCYTES 4 (L) 12 - 49 % MONOCYTES 4 (L) 5 - 13 % EOSINOPHILS 0 0 - 7 % BASOPHILS 0 0 - 1 % IMMATURE GRANULOCYTES 0 0.0 - 0.5 % ABS. NEUTROPHILS 12.2 (H) 1.8 - 8.0 K/UL  
 ABS. LYMPHOCYTES 0.5 (L) 0.8 - 3.5 K/UL  
 ABS. MONOCYTES 0.5 0.0 - 1.0 K/UL  
 ABS. EOSINOPHILS 0.0 0.0 - 0.4 K/UL  
 ABS. BASOPHILS 0.0 0.0 - 0.1 K/UL  
 ABS. IMM. GRANS. 0.0 0.00 - 0.04 K/UL  
 DF SMEAR SCANNED    
 RBC COMMENTS NORMOCYTIC, NORMOCHROMIC METABOLIC PANEL, COMPREHENSIVE Collection Time: 01/07/19 11:38 AM  
Result Value Ref Range Sodium 139 136 - 145 mmol/L Potassium 4.2 3.5 - 5.1 mmol/L Chloride 97 97 - 108 mmol/L  
 CO2 33 (H) 21 - 32 mmol/L Anion gap 9 5 - 15 mmol/L Glucose 126 (H) 65 - 100 mg/dL BUN 20 6 - 20 MG/DL Creatinine 1.21 (H) 0.55 - 1.02 MG/DL  
 BUN/Creatinine ratio 17 12 - 20 GFR est AA 55 (L) >60 ml/min/1.73m2 GFR est non-AA 45 (L) >60 ml/min/1.73m2 Calcium 9.6 8.5 - 10.1 MG/DL Bilirubin, total 0.7 0.2 - 1.0 MG/DL  
 ALT (SGPT) 17 12 - 78 U/L  
 AST (SGOT) 20 15 - 37 U/L Alk. phosphatase 131 (H) 45 - 117 U/L Protein, total 7.7 6.4 - 8.2 g/dL Albumin 3.6 3.5 - 5.0 g/dL Globulin 4.1 (H) 2.0 - 4.0 g/dL A-G Ratio 0.9 (L) 1.1 - 2.2    
TROPONIN I Collection Time: 01/07/19 11:38 AM  
Result Value Ref Range Troponin-I, Qt. 0.12 (H) <0.05 ng/mL LACTIC ACID Collection Time: 01/07/19 11:38 AM  
Result Value Ref Range Lactic acid 1.4 0.4 - 2.0 MMOL/L  
URINALYSIS W/ RFLX MICROSCOPIC Collection Time: 01/07/19 12:12 PM  
Result Value Ref Range Color YELLOW/STRAW Appearance CLEAR CLEAR Specific gravity 1.015 1.003 - 1.030    
 pH (UA) 6.5 5.0 - 8.0 Protein 100 (A) NEG mg/dL Glucose NEGATIVE  NEG mg/dL Ketone NEGATIVE  NEG mg/dL Bilirubin NEGATIVE  NEG Blood NEGATIVE  NEG Urobilinogen 0.2 0.2 - 1.0 EU/dL Nitrites NEGATIVE  NEG Leukocyte Esterase NEGATIVE  NEG    
URINE MICROSCOPIC ONLY Collection Time: 01/07/19 12:12 PM  
Result Value Ref Range  WBC 0-4 0 - 4 /hpf  
 RBC 0-5 0 - 5 /hpf  
 Epithelial cells FEW FEW /lpf Bacteria NEGATIVE  NEG /hpf  
EKG, 12 LEAD, INITIAL Collection Time: 01/07/19 12:43 PM  
Result Value Ref Range Ventricular Rate 94 BPM  
 Atrial Rate 94 BPM  
 P-R Interval 184 ms QRS Duration 108 ms Q-T Interval 384 ms QTC Calculation (Bezet) 480 ms Calculated P Axis 55 degrees Calculated R Axis 134 degrees Calculated T Axis 33 degrees Diagnosis Normal sinus rhythm Possible Left atrial enlargement Right bundle branch block Anteroseptal infarct , age undetermined Abnormal ECG When compared with ECG of 14-NOV-2018 03:48, 
MANUAL COMPARISON REQUIRED, DATA IS UNCONFIRMED 
  
GLUCOSE, POC Collection Time: 01/07/19  4:01 PM  
Result Value Ref Range Glucose (POC) 172 (H) 65 - 100 mg/dL Performed by Valencia Vásquez (REY) Radiologic Studies -  
XR SPINE LUMB 2 OR 3 V    (Results Pending) CT Results  (Last 48 hours) 01/07/19 1049  CT HEAD WO CONT Final result Impression:  IMPRESSION:   
1. Questionable, small, right occipital infarction. 2. Old, small, right parietal infarction. 3. MRI brain with IV contrast is recommended for further evaluation. The findings were called to Dr. William García on 1/7/2019 at 1127 hours by Dr. Anyi Murphy. [facility] [address] Narrative:  HEAD CT WITHOUT CONTRAST: 1/7/2019 10:49 AM  
   
INDICATION: Headache and nausea. COMPARISON: None. PROCEDURE: Axial images of the head were obtained without contrast. Coronal and  
sagittal reformats were performed. CT dose reduction was achieved through use of  
a standardized protocol tailored for this examination and automatic exposure  
control for dose modulation. FINDINGS: Hypodensity in the right occipital white matter (2-14) may extend  
slightly to the cortex (6-13).  While this may represent small vessel ischemic  
disease, it is in an unusual location, and is focal. Similar hypodensity in the  
 left parietal lobe (5-29, 6-8) more clearly extends to the cortex, and more  
likely represents a small old infarction. The ventricles and sulci are  
appropriate in size and configuration for age. No mass effect or intracranial  
hemorrhage. CXR Results  (Last 48 hours) 01/07/19 1145  XR CHEST SNGL V Final result Impression:  IMPRESSION: Mild bibasilar opacities may represent atelectasis but infection is  
not clinical setting. Follow-up PA and lateral chest radiographs are suggested  
when the patient is able and if clinically warranted. Narrative:  EXAM:  XR CHEST SNGL V  
   
INDICATION: Altered mental status COMPARISON: 11/14/2018. TECHNIQUE: Portable AP upright chest view at 1137 hours FINDINGS: The cardiomediastinal contours are stable. There are mild bibasilar  
opacities. There is no pleural effusion or pneumothorax. The bones and upper  
abdomen are stable. Medical Decision Making I am the first provider for this patient. I reviewed the vital signs, available nursing notes, past medical history, past surgical history, family history and social history. Vital Signs-Reviewed the patient's vital signs. Patient Vitals for the past 12 hrs: 
 Temp Pulse Resp BP SpO2  
01/07/19 1810 98.4 °F (36.9 °C) 86 18 130/68 96 % 01/07/19 1807  84 16 137/58 94 % Pulse Oximetry Analysis - 96% on 2L NC Records Reviewed: Nursing Notes, Old Medical Records, Ambulance Run Sheet, Previous Radiology Studies and Previous Laboratory Studies Provider Notes (Medical Decision Making):  
Pt transfer with PNA and new stroke. Will admit. Has had abx. Not a candidate for tPA. ED Course:  
Initial assessment performed. The patients presenting problems have been discussed, and they are in agreement with the care plan formulated and outlined with them. I have encouraged them to ask questions as they arise throughout their visit. Progress Notes: 
CONSULT NOTE:  
6:54 PM 
Heidi Sun DO spoke with Dr. Claudia Funk, Specialty: Hospitalist 
Discussed pt's hx, disposition, and available diagnostic and imaging results. Reviewed care plans. Consultant will evaluate pt for admission. Written by Freddy Lopes ED Scribe, as dictated by Heidi Sun DO. Critical Care Time:  
0 minutes. Disposition: PLAN FOR ADMISSION: 
7:19 PM 
The patient is being admitted to the hospital. The results of their tests and reasons for their admission have been discussed with the patient and/or available family. The patient/family has conveyed agreement and understanding for the need to be admitted and for their admission diagnosis. Consultation has been made with the inpatient physician specialist for hospitalization. Written by BRENDEN Ascencioibe, as dictated by Heidi Sun DO Diagnosis Clinical Impression: 1. Cerebrovascular accident (CVA), unspecified mechanism ([address] 2. NSTEMI (non-ST elevated myocardial infarction) ([address] 3. Acute respiratory failure with hypoxia ([address] 4. Community acquired pneumonia, unspecified laterality Attestations: This note is prepared by Freddy Lopes, acting as scribe for DO Heidi Medina DO: The scribe's documentation has been prepared under my direction and personally reviewed by me in its entirety. I confirm that the note above accurately reflects all work, treatment, procedures, and medical decision making performed by me.

## 2019-01-08 NOTE — PROGRESS NOTES
Hospitalist Progress NoteNAME: Wayne Capps :  1960 MRN:  [id number] Interim Hospital Summary: 59 y.o. female whom presented on 2019 with Assessment / Plan: 
 
Right occipital CVA Transferred from [facility] for  acute Rt occipital CVA Presents with nausea and dizziness 
- CT head Questionable, small, right occipital infarction - MRI brain Small acute right occipital lobe infarct.   
- carotid US pending 
- Echocardiogram pending - Telemetry to screen for atrial fibrillation - Permissive HTN (SBP<220/<120) for 24 hrs from symptom onset and then adjust medications for BP goal is less than 140/90 
- HgBA1c  
- LDL pending. Start statin if LDL >70  
- Continue ASA 81 mg daily 
- ST/OT/PT eval and treat 
- Neurology consult 
  
Troponin elevation CAD, hx NSTEMI 2018 
-consult cardiology ANN vs CKD? 
-baseline not clear. Renal US 2018 was unremarkable 
-will hold demadex for now until her PO intake is stable 
-follow Cr 
 
DM2 
-SSI prn Bronchitis? COPD Ex smoker Presented to ED with sore throat; Denies SOB Nebs. Monitor  
  
Chronic lymphedema / venous stasis Wound care consult Holding demadex for now 
  
Hypothyroid Cont synthroid 
  
Code Status: Full Surrogate Decision Maker: sister [phone] 
  
DVT Prophylaxis: lovenox GI Prophylaxis: not indicated Subjective: Chief Complaint / Reason for Physician Visit Follow up of CVA, bronchitis, lymphedema, COPD Chart reviewed in detail. Discussed with RN events overnight. Review of Systems: 
Symptom Y/N Comments  Symptom Y/N Comments Fever/Chills    Chest Pain Poor Appetite    Edema Cough    Abdominal Pain Sputum    Joint Pain SOB/RAMIREZ    Pruritis/Rash Nausea/vomit    Tolerating PT/OT Diarrhea    Tolerating Diet Constipation    Other Could NOT obtain due to:   
 
PO intake: No data found. Objective: VITALS:  
 Last 24hrs VS reviewed since prior progress note. Most recent are: 
Patient Vitals for the past 24 hrs: 
 Temp Pulse Resp BP SpO2  
01/08/19 0400 98.3 °F (36.8 °C) 79 19 108/90 97 % 01/08/19 0001 97.9 °F (36.6 °C) 83 24 132/60 94 % 01/07/19 2200  80 17 129/46 95 % 01/07/19 2113  86   97 % 01/07/19 2111    141/48   
01/07/19 1810 98.4 °F (36.9 °C) 86 18 130/68 96 % 01/07/19 1807  84 16 137/58 94 % No intake or output data in the 24 hours ending 01/08/19 4444 PHYSICAL EXAM: 
General: WD, WN. Alert, cooperative, no acute distress   
EENT:  EOMI. Anicteric sclerae. MMM Resp:  CTA bilaterally, no wheezing or rales. No accessory muscle use CV:  Regular  rhythm,  No edema GI:  Soft, Non distended, Non tender.  +Bowel sounds Neurologic:  Alert and oriented X 3, normal speech, Psych:   Good insight. Not anxious nor agitated Skin:  No rashes. No jaundice Reviewed most current lab test results and cultures  YES Reviewed most current radiology test results   YES Review and summation of old records today    NO Reviewed patient's current orders and MAR    YES 
PMH/ reviewed - no change compared to H&P 
________________________________________________________________________ Care Plan discussed with: 
  Comments Patient x Family RN Care Manager Consultant Multidiciplinary team rounds were held today with , nursing, pharmacist and clinical coordinator. Patient's plan of care was discussed; medications were reviewed and discharge planning was addressed. ________________________________________________________________________ Total NON critical care TIME: 30    Minutes Total CRITICAL CARE TIME Spent:   Minutes non procedure based Comments >50% of visit spent in counseling and coordination of care x This includes time during multidisciplinary rounds if indicated above ________________________________________________________________________ Gabbi Spears MD  
 
Procedures: see electronic medical records for all procedures/Xrays and details which were not copied into this note but were reviewed prior to creation of Plan. LABS: 
I reviewed today's most current labs and imaging studies. Pertinent labs include: 
Recent Labs 01/08/19 
0404 01/07/19 
1138 WBC 10.5 13.2* HGB 12.2 14.3 HCT 40.6 48.2*  
 264 Recent Labs 01/08/19 
0404 01/07/19 
1138  139  
K 4.0 4.2 CL 97 97 CO2 32 33* * 126* BUN 27* 20  
CREA 1.67* 1.21* CA 8.7 9.6 ALB  --  3.6 TBILI  --  0.7 SGOT  --  20 ALT  --  17

## 2019-01-08 NOTE — ED NOTES
Bedside and Verbal shift change report given to Ismael Farrell RN (oncoming nurse) by Ave Keita RN (offgoing nurse). Report included the following information SBAR, Kardex, ED Summary and Recent Results.

## 2019-01-08 NOTE — PROGRESS NOTES
[facility]  Preliminary Report:  Carotid Duplex Scan Right: 
No plaque noted in the right carotid system. Right ICA velocities suggest 0% diameter reduction. Right vertebral artery flow is antegrade. Left: 
No plaque noted in the left carotid system. Left ICA velocities suggest 0% diameter reduction. Left vertebral artery flow is antegrade. Final report to follow.

## 2019-01-08 NOTE — CONSULTS
NEUROLOGY NOTE         DATE OF CONSULTATION: 1/8/2019    CONSULTED BY: Mily Murphy DO    Chief Complaint   Patient presents with    Headache       Reason for Consult  I have been asked to see the patient in neurological consultation to render advice and opinion regarding stroke    HISTORY OF PRESENT ILLNESS  Jessi Collins, 59 y.o. female with PMHx significant for developmental delay, IDDM, HTN, GERD, hypothyroid, venous insufficiency, presents as transfer from hospitals to the ED with cc of HA, n/v, and cough for unknown onset. Pt arrived at hospitals uncooperative. Pt was transferred from hospitals to here due to MRI of brain showing small acute R occipital lobe infarct,     Hx and exam  limited due to hx of developmental delay and poor cooperation. She is complaining of a headache, she denies any new problem with her vision, no limb weakness or numbness. ROS  POSITIVES ARE RED  Review of Systems unreliable  Constitutional: chills and fever. HENT:  ear pain. Eyes:  pain and discharge. Respiratory: cough and hemoptysis. Cardiovascular:chest pain and claudication. Gastrointestinal:  constipation and diarrhea. Genitourinary:  flank pain and hematuria. Musculoskeletal:falls and myalgias. Negative for back pain. Skin:  itching and rash. Neurological:  sensory change and focal weakness. Negative for headaches. Endo/Heme/Allergies:  environmental allergies. Does not bruise/bleed easily.    Psychiatric/Behavioral:  depression and hallucinations.           PMH  Past Medical History:   Diagnosis Date    Arthritis     Asthma     Bronchitis     Developmental delay     GERD (gastroesophageal reflux disease)     Hypercholesterolemia     Hypertension     Hypothyroid     IDDM (insulin dependent diabetes mellitus) (HCC)     Morbid obesity (HCC)     Peripheral neuropathy     Thyroid disease     Venous insufficiency        FH  Family History   Problem Relation Age of Onset    COPD Father    [address] [facility] Alcohol abuse Father          Social History     Socioeconomic History    Marital status: SINGLE     Spouse name: Not on file    Number of children: Not on file    Years of education: Not on file    Highest education level: Not on file   Tobacco Use    Smoking status: Current Every Day Smoker     Packs/day: 0.50     Types: Cigarettes     Last attempt to quit: 2012     Years since quittin.0    Smokeless tobacco: Never Used   Substance and Sexual Activity    Alcohol use: Yes     Comment: social    Drug use: No       ALLERGIES  No Known Allergies    PHYSICAL EXAM  EXAMINATION:   Patient Vitals for the past 24 hrs:   Temp Pulse Resp BP SpO2   19 0400 98.3 °F (36.8 °C) 79 19 108/90 97 %   19 0001 97.9 °F (36.6 °C) 83 24 132/60 94 %   19 2200  80 17 129/46 95 %   19 2113  86   97 %   19 2111    141/48    19 1810 98.4 °F (36.9 °C) 86 18 130/68 96 %   19 1807  84 16 137/58 94 %        General:   Physical Exam   CONSTITUTIONAL: Oriented to person, place, and time, appears well-developed and well-nourished. No distress. Head: Normocephalic and atraumatic. Neurological Examination:   Mental Status:  AAO x3. Speech is fluent. Follows simple commands, limited understanding of current problems  Cranial Nerves: Uncooperative PERRL,Facial movement intact, symmetric. Hearing intact to conversation. Motor: Strength is 5/5 in all 4 ext. Sensation: Normal to pain    Reflexes: DTRs 2ymmetric throughout. Plantar responses downgoing.      Coordination/Cerebellar:unable    Gait: NT     LAB DATA REVIEWED:    Results for orders placed or performed during the hospital encounter of 19   TROPONIN I   Result Value Ref Range    Troponin-I, Qt. 0.36 (H) <0.05 ng/mL   CBC WITH AUTOMATED DIFF   Result Value Ref Range    WBC 10.5 3.6 - 11.0 K/uL    RBC 5.08 3.80 - 5.20 M/uL    HGB 12.2 11.5 - 16.0 g/dL    HCT 40.6 35.0 - 47.0 %    MCV 79.9 (L) 80.0 - 99.0 FL MCH 24.0 (L) 26.0 - 34.0 PG    MCHC 30.0 30.0 - 36.5 g/dL    RDW 16.4 (H) 11.5 - 14.5 %    PLATELET 159 673 - 628 K/uL    MPV 10.3 8.9 - 12.9 FL    NRBC 0.0 0  WBC    ABSOLUTE NRBC 0.00 0.00 - 0.01 K/uL    NEUTROPHILS 79 (H) 32 - 75 %    LYMPHOCYTES 11 (L) 12 - 49 %    MONOCYTES 9 5 - 13 %    EOSINOPHILS 0 0 - 7 %    BASOPHILS 0 0 - 1 %    IMMATURE GRANULOCYTES 1 (H) 0.0 - 0.5 %    ABS. NEUTROPHILS 8.4 (H) 1.8 - 8.0 K/UL    ABS. LYMPHOCYTES 1.1 0.8 - 3.5 K/UL    ABS. MONOCYTES 0.9 0.0 - 1.0 K/UL    ABS. EOSINOPHILS 0.0 0.0 - 0.4 K/UL    ABS. BASOPHILS 0.0 0.0 - 0.1 K/UL    ABS. IMM. GRANS. 0.1 (H) 0.00 - 0.04 K/UL    DF AUTOMATED     METABOLIC PANEL, BASIC   Result Value Ref Range    Sodium 137 136 - 145 mmol/L    Potassium 4.0 3.5 - 5.1 mmol/L    Chloride 97 97 - 108 mmol/L    CO2 32 21 - 32 mmol/L    Anion gap 8 5 - 15 mmol/L    Glucose 226 (H) 65 - 100 mg/dL    BUN 27 (H) 6 - 20 MG/DL    Creatinine 1.67 (H) 0.55 - 1.02 MG/DL    BUN/Creatinine ratio 16 12 - 20      GFR est AA 38 (L) >60 ml/min/1.73m2    GFR est non-AA 31 (L) >60 ml/min/1.73m2    Calcium 8.7 8.5 - 10.1 MG/DL        Imaging review:  See below. Stroke workup    MRI Brain  FINDINGS:   Motion limits examination.     The ventricles are normal in size and configuration without hydrocephalus. There  is no mass effect or midline shift. There is no extra-axial fluid collection.      There is an area of abnormal restricted diffusion in the paramedian right  occipital lobe.     There are scattered foci of hyperintense T2/flair signal in the periventricular  white matter in keeping with chronic microvascular ischemic disease. There is no  abnormal gradient susceptibility.     The major intracranial vascular flow-voids are patent.  The midline structures,  including the cervicomedullary junction, are within normal limits.      There is mucosal thickening in the right maxillary sinus and to a lesser extent  the right sphenoid sinus.     IMPRESSION  IMPRESSION:  1. Small acute right occipital lobe infarct.     2. Mild chronic microvascular ischemic changes in the periventricular white  matter.       MRA HEAD:   Pending        TTE:   Pending  Stroke labs:  HgBA1c    Lab Results   Component Value Date/Time    Hemoglobin A1c 7.0 (H) 11/15/2018 04:51 AM     LDL   Lab Results   Component Value Date/Time    LDL, calculated 68.6 11/15/2018 04:51 AM       HOME MEDS  Prior to Admission Medications   Prescriptions Last Dose Informant Patient Reported? Taking?   aspirin delayed-release 81 mg tablet  Parent Yes No   Sig: Take 81 mg by mouth daily. gabapentin (NEURONTIN) 300 mg capsule  Parent Yes No   Sig: Take 600 mg by mouth nightly. insulin aspart U-100 (NOVOLOG FLEXPEN U-100 INSULIN) 100 unit/mL inpn  Self No No   Si Units by SubCUTAneous route Before breakfast, lunch, and dinner. + SSI TIDAC:  140-199=2 u            200-249=3 u  250-299=5 u  300-349=8 u   Patient taking differently: 5 Units by SubCUTAneous route Before breakfast, lunch, and dinner. Sliding Scale   under none  150-200 5 units  201-250 10 units  251-300 15 units  301-350 20 units  351-400 25 units  over 400 call MD   insulin glargine (LANTUS) 100 unit/mL injection  Parent Yes No   Si Units by SubCUTAneous route daily (after dinner). Patient takes at 1830 each day   ipratropium-albuterol (COMBIVENT RESPIMAT)  mcg/actuation inhaler  Parent Yes No   Sig: Take 1 Puff by inhalation every six (6) hours as needed for Shortness of Breath. levothyroxine (SYNTHROID) 150 mcg tablet  Parent Yes No   Sig: Take 150 mcg by mouth Daily (before breakfast). potassium chloride (K-DUR, KLOR-CON) 20 mEq tablet   Yes No   Sig: Take 20 mEq by mouth every other day. Alternates day with torsemide   silver sulfADIAZINE (SILVADENE) 1 % topical cream  Self Yes No   Sig: Apply 1 Each to affected area every Monday, Wednesday, Friday.  Apply to legs   torsemide (DEMADEX) 20 mg tablet  Self Yes No   Sig: Take 20 mg by mouth every other day. Alternates administration w/ potassium supplement   zinc 50 mg tab tablet  Self Yes No   Sig: Take 50 mg by mouth daily. Facility-Administered Medications: None       CURRENT MEDS  Current Facility-Administered Medications   Medication Dose Route Frequency    gabapentin (NEURONTIN) capsule 600 mg  600 mg Oral QHS    levothyroxine (SYNTHROID) tablet 150 mcg  150 mcg Oral ACB    insulin lispro (HUMALOG) injection   SubCUTAneous AC&HS    albuterol-ipratropium (DUO-NEB) 2.5 MG-0.5 MG/3 ML  3 mL Nebulization QID RT    sodium chloride (NS) flush 5-40 mL  5-40 mL IntraVENous Q8H    aspirin chewable tablet 81 mg  81 mg Oral DAILY    enoxaparin (LOVENOX) injection 40 mg  40 mg SubCUTAneous Q12H       IMPRESSION:RECOMMENDATIONS:  Allan Skinner is a 59 y.o. female who presents with right occipital infarct, stroke w/u in progress, MRA head and neck, 2D Echo Heart, Stroke labs, agree with ASA 81 mg/day      Lisbet Viveros. Jordan Alarcon MD  Neurologist      This note will not be viewable in [address]

## 2019-01-09 ENCOUNTER — APPOINTMENT (OUTPATIENT)
Dept: CT IMAGING | Age: 59
DRG: 064 | End: 2019-01-09
Attending: INTERNAL MEDICINE
Payer: MEDICARE

## 2019-01-09 ENCOUNTER — APPOINTMENT (OUTPATIENT)
Dept: GENERAL RADIOLOGY | Age: 59
DRG: 064 | End: 2019-01-09
Attending: INTERNAL MEDICINE
Payer: MEDICARE

## 2019-01-09 PROBLEM — I65.23 BILATERAL CAROTID ARTERY STENOSIS: Status: ACTIVE | Noted: 2019-01-09

## 2019-01-09 LAB
ANION GAP SERPL CALC-SCNC: 7 MMOL/L (ref 5–15)
BUN SERPL-MCNC: 25 MG/DL (ref 6–20)
BUN/CREAT SERPL: 22 (ref 12–20)
CALCIUM SERPL-MCNC: 8.5 MG/DL (ref 8.5–10.1)
CHLORIDE SERPL-SCNC: 97 MMOL/L (ref 97–108)
CHOLEST SERPL-MCNC: 129 MG/DL
CO2 SERPL-SCNC: 32 MMOL/L (ref 21–32)
CREAT SERPL-MCNC: 1.15 MG/DL (ref 0.55–1.02)
D DIMER PPP FEU-MCNC: 0.99 MG/L FEU (ref 0–0.65)
GLUCOSE BLD STRIP.AUTO-MCNC: 206 MG/DL (ref 65–100)
GLUCOSE BLD STRIP.AUTO-MCNC: 292 MG/DL (ref 65–100)
GLUCOSE BLD STRIP.AUTO-MCNC: 337 MG/DL (ref 65–100)
GLUCOSE BLD STRIP.AUTO-MCNC: 354 MG/DL (ref 65–100)
GLUCOSE SERPL-MCNC: 182 MG/DL (ref 65–100)
HDLC SERPL-MCNC: 63 MG/DL
HDLC SERPL: 2 {RATIO} (ref 0–5)
LDLC SERPL CALC-MCNC: 48.6 MG/DL (ref 0–100)
LIPID PROFILE,FLP: NORMAL
MAGNESIUM SERPL-MCNC: 1.8 MG/DL (ref 1.6–2.4)
POTASSIUM SERPL-SCNC: 3.9 MMOL/L (ref 3.5–5.1)
SERVICE CMNT-IMP: ABNORMAL
SODIUM SERPL-SCNC: 136 MMOL/L (ref 136–145)
TRIGL SERPL-MCNC: 87 MG/DL (ref ?–150)
VLDLC SERPL CALC-MCNC: 17.4 MG/DL

## 2019-01-09 PROCEDURE — 85379 FIBRIN DEGRADATION QUANT: CPT

## 2019-01-09 PROCEDURE — 97530 THERAPEUTIC ACTIVITIES: CPT

## 2019-01-09 PROCEDURE — 71045 X-RAY EXAM CHEST 1 VIEW: CPT

## 2019-01-09 PROCEDURE — 94640 AIRWAY INHALATION TREATMENT: CPT

## 2019-01-09 PROCEDURE — 80061 LIPID PANEL: CPT

## 2019-01-09 PROCEDURE — 74011250636 HC RX REV CODE- 250/636: Performed by: INTERNAL MEDICINE

## 2019-01-09 PROCEDURE — 71275 CT ANGIOGRAPHY CHEST: CPT

## 2019-01-09 PROCEDURE — 74011636320 HC RX REV CODE- 636/320: Performed by: INTERNAL MEDICINE

## 2019-01-09 PROCEDURE — 74011250637 HC RX REV CODE- 250/637: Performed by: INTERNAL MEDICINE

## 2019-01-09 PROCEDURE — 65660000000 HC RM CCU STEPDOWN

## 2019-01-09 PROCEDURE — 36415 COLL VENOUS BLD VENIPUNCTURE: CPT

## 2019-01-09 PROCEDURE — 83735 ASSAY OF MAGNESIUM: CPT

## 2019-01-09 PROCEDURE — 82962 GLUCOSE BLOOD TEST: CPT

## 2019-01-09 PROCEDURE — 74011636637 HC RX REV CODE- 636/637: Performed by: INTERNAL MEDICINE

## 2019-01-09 PROCEDURE — 77010033678 HC OXYGEN DAILY

## 2019-01-09 PROCEDURE — 80048 BASIC METABOLIC PNL TOTAL CA: CPT

## 2019-01-09 PROCEDURE — 94760 N-INVAS EAR/PLS OXIMETRY 1: CPT

## 2019-01-09 PROCEDURE — 74011000250 HC RX REV CODE- 250: Performed by: INTERNAL MEDICINE

## 2019-01-09 RX ORDER — SODIUM CHLORIDE 0.9 % (FLUSH) 0.9 %
10 SYRINGE (ML) INJECTION
Status: COMPLETED | OUTPATIENT
Start: 2019-01-09 | End: 2019-01-09

## 2019-01-09 RX ORDER — SODIUM CHLORIDE 9 MG/ML
50 INJECTION, SOLUTION INTRAVENOUS
Status: COMPLETED | OUTPATIENT
Start: 2019-01-09 | End: 2019-01-09

## 2019-01-09 RX ORDER — TORSEMIDE 20 MG/1
20 TABLET ORAL EVERY OTHER DAY
Status: DISCONTINUED | OUTPATIENT
Start: 2019-01-10 | End: 2019-01-11 | Stop reason: HOSPADM

## 2019-01-09 RX ORDER — COLCHICINE 0.6 MG/1
1.2 TABLET ORAL ONCE
Status: COMPLETED | OUTPATIENT
Start: 2019-01-09 | End: 2019-01-09

## 2019-01-09 RX ORDER — BUTALBITAL, ACETAMINOPHEN AND CAFFEINE 50; 325; 40 MG/1; MG/1; MG/1
1 TABLET ORAL
Status: DISCONTINUED | OUTPATIENT
Start: 2019-01-09 | End: 2019-01-11 | Stop reason: HOSPADM

## 2019-01-09 RX ADMIN — GABAPENTIN 600 MG: 300 CAPSULE ORAL at 21:15

## 2019-01-09 RX ADMIN — ACETAMINOPHEN 650 MG: 325 TABLET ORAL at 12:24

## 2019-01-09 RX ADMIN — BUTALBITAL, ACETAMINOPHEN AND CAFFEINE 1 TABLET: 50; 325; 40 TABLET ORAL at 21:18

## 2019-01-09 RX ADMIN — Medication 10 ML: at 21:15

## 2019-01-09 RX ADMIN — INSULIN LISPRO 2 UNITS: 100 INJECTION, SOLUTION INTRAVENOUS; SUBCUTANEOUS at 21:15

## 2019-01-09 RX ADMIN — INSULIN LISPRO 2 UNITS: 100 INJECTION, SOLUTION INTRAVENOUS; SUBCUTANEOUS at 08:21

## 2019-01-09 RX ADMIN — IOPAMIDOL 80 ML: 755 INJECTION, SOLUTION INTRAVENOUS at 20:53

## 2019-01-09 RX ADMIN — INSULIN LISPRO 6 UNITS: 100 INJECTION, SOLUTION INTRAVENOUS; SUBCUTANEOUS at 18:10

## 2019-01-09 RX ADMIN — LEVOTHYROXINE SODIUM 150 MCG: 75 TABLET ORAL at 06:33

## 2019-01-09 RX ADMIN — COLCHICINE 1.2 MG: 0.6 TABLET, FILM COATED ORAL at 12:31

## 2019-01-09 RX ADMIN — IPRATROPIUM BROMIDE AND ALBUTEROL SULFATE 3 ML: .5; 3 SOLUTION RESPIRATORY (INHALATION) at 08:51

## 2019-01-09 RX ADMIN — IPRATROPIUM BROMIDE AND ALBUTEROL SULFATE 3 ML: .5; 3 SOLUTION RESPIRATORY (INHALATION) at 12:19

## 2019-01-09 RX ADMIN — INSULIN LISPRO 3 UNITS: 100 INJECTION, SOLUTION INTRAVENOUS; SUBCUTANEOUS at 12:23

## 2019-01-09 RX ADMIN — Medication 10 ML: at 03:18

## 2019-01-09 RX ADMIN — Medication 10 ML: at 12:33

## 2019-01-09 RX ADMIN — INSULIN GLARGINE 12 UNITS: 100 INJECTION, SOLUTION SUBCUTANEOUS at 18:09

## 2019-01-09 RX ADMIN — SODIUM CHLORIDE 50 ML/HR: 900 INJECTION, SOLUTION INTRAVENOUS at 18:00

## 2019-01-09 RX ADMIN — Medication 10 ML: at 20:54

## 2019-01-09 RX ADMIN — ENOXAPARIN SODIUM 40 MG: 40 INJECTION SUBCUTANEOUS at 17:41

## 2019-01-09 RX ADMIN — ASPIRIN 81 MG 81 MG: 81 TABLET ORAL at 08:22

## 2019-01-09 NOTE — PROGRESS NOTES
Problem: Mobility Impaired (Adult and Pediatric) Goal: *Acute Goals and Plan of Care (Insert Text) Physical Therapy Goals Initiated 1/8/2019 1. Patient will move from supine to sit and sit to supine , scoot up and down and roll side to side in bed with independence within 7 day(s). 2.  Patient will transfer from bed to chair and chair to bed with independence using the least restrictive device within 7 day(s). 3.  Patient will perform sit to stand with independence within 7 day(s). 4.  Patient will ambulate with modified independence for 150 feet with the least restrictive device within 7 day(s). physical Therapy TREATMENT Patient: Berenice Chowdhury (3 y.o. female) Date: 1/9/2019 Diagnosis: CVA (cerebral vascular accident) ([facility] [address] <principal problem not specified> Precautions: Fall Chart, physical therapy assessment, plan of care and goals were reviewed. ASSESSMENT: 
Patient lying in bed with sister/POA at bedside. Agreed to therapy and cleared by nursing. MRI positive for R occipital lobe infarct. Reports R ankle hurts when doing ankle pumps or pressure on foot due to gout flare up. Progressed to sitting on edge of bed with sba. Attempted sit to standing and needed mod/max a x 2 and able to clear buttocks from bed only due to increasing R foot pain with wb. Worked on scooting sideways toward the pillow and needed mod a x 2. Returned to supine with mod a x 2. Repositioned with bed in chair position. All needs met when the session ended. Now recommending SNF level rehab upon discharge. Patient and POA would prefer and facility near their home in [facility]. She is currently unable to return home at current functional level. Progression toward goals: 
[]       Improving appropriately and progressing toward goals [x]       Improving slowly and progressing toward goals 
[]       Not making progress toward goals and plan of care will be adjusted PLAN: 
 Patient continues to benefit from skilled intervention to address the above impairments. Continue treatment per established plan of care. Discharge Recommendations:  Tesfaye Velasquez Further Equipment Recommendations for Discharge:  TBD SUBJECTIVE:  
Patient stated Kennedy Langley just started me on medicine for my gout.  OBJECTIVE DATA SUMMARY:  
Critical Behavior: 
Neurologic State: Alert Orientation Level: Oriented X4 Cognition: Appropriate for age attention/concentration, Follows commands Safety/Judgement: Awareness of environment Functional Mobility Training: 
Bed Mobility: 
Rolling: Independent Supine to Sit: Stand-by assistance Sit to Supine: Moderate assistance;Assist x2 Scooting: Stand-by assistance Transfers: 
Sit to Stand: Moderate assistance;Maximum assistance;Assist x2(unable to stand up completely due to R ankle/foot pain from ) Stand to Sit: Moderate assistance;Maximum assistance;Assist x2 Balance: 
Sitting: Intact Standing - Static: Not testedAmbulation/Gait Training: 
  
  
  
  
Gait Description (WDL): (unable to tolerate R foot wb due to gout pain.) Pain: 
 R ankle/ to palpation and wb 
2/10 unloaded and 9/10 with wb Activity Tolerance:  
fair Please refer to the flowsheet for vital signs taken during this treatment. After treatment:  
[] Patient left in no apparent distress sitting up in chair 
[x] Patient left in no apparent distress in bed 
[x] Call bell left within reach [x] Nursing notified 
[x] Caregiver present 
[] Bed alarm activated COMMUNICATION/EDUCATION:  
The patients plan of care was discussed with: Occupational Therapist, Registered Nurse and . Patient and/or family was verbally educated on the BE FAST acronym for signs/symptoms of CVA and TIA. BE FAST was written on patient's communication board  for visual education and reinforcement.   All questions answered with patient indicating good understanding. [x]  Fall prevention education was provided and the patient/caregiver indicated understanding. [x]  Patient/family have participated as able in goal setting and plan of care. [x]  Patient/family agree to work toward stated goals and plan of care. []  Patient understands intent and goals of therapy, but is neutral about his/her participation. []  Patient is unable to participate in goal setting and plan of care. Thank you for this referral. 
Binta Stephens, PT Time Calculation: 31 mins

## 2019-01-09 NOTE — CONSULTS
[address] [place] [address]  [phone]        Date of  Admission: 2019  6:03 PM     Admission type:Emergency    Consult for: ? ILR with recent CVA  Consult by: Dr Willy Chandra NP     Subjective:     Berenice Chowdhury is a 59 y.o. female admitted for CVA (cerebral vascular accident) ([facility] [address] Patient has no complaints. Arouses to stimuli. Sister is present and reports pt has frequent falls due to low sugar (on insulin).      Patient Active Problem List    Diagnosis Date Noted    Elevated troponin 2019    CVA (cerebral vascular accident) ([facility] [address] 2019    COPD exacerbation ([address] 2018    NSTEMI (non-ST elevated myocardial infarction) ([address] 2018    Acute respiratory failure with hypoxia ([address] 2018    DKA (diabetic ketoacidoses) ([facility] [address] 03/15/2018    ANN (acute kidney injury) ([address] 03/15/2018    Venous stasis dermatitis 2014    Venous stasis syndrome 2014    IDDM (insulin dependent diabetes mellitus) ([address]     Morbid obesity (HCC)     Venous insufficiency       Marisol Lynch MD  Past Medical History:   Diagnosis Date    Arthritis     Asthma     Bronchitis     Developmental delay     GERD (gastroesophageal reflux disease)     Hypercholesterolemia     Hypertension     Hypothyroid     IDDM (insulin dependent diabetes mellitus) ([address]     Morbid obesity ([address]     Peripheral neuropathy     Thyroid disease     Venous insufficiency       Social History     Socioeconomic History    Marital status: SINGLE     Spouse name: Not on file    Number of children: Not on file    Years of education: Not on file    Highest education level: Not on file   Tobacco Use    Smoking status: Current Every Day Smoker     Packs/day: 0.50     Types: Cigarettes     Last attempt to quit: 2012     Years since quittin.0    Smokeless tobacco: Never Used   Substance and Sexual Activity    Alcohol use: Yes     Comment: social    Drug use: No     No Known Allergies   Family History   Problem Relation Age of Onset    COPD Father     Alcohol abuse Father       Current Facility-Administered Medications   Medication Dose Route Frequency    gabapentin (NEURONTIN) capsule 600 mg  600 mg Oral QHS    levothyroxine (SYNTHROID) tablet 150 mcg  150 mcg Oral ACB    albuterol-ipratropium (DUO-NEB) 2.5 MG-0.5 MG/3 ML  3 mL Nebulization Q6H PRN    guaiFENesin (ROBITUSSIN) 100 mg/5 mL oral liquid 100 mg  100 mg Oral Q6H PRN    insulin lispro (HUMALOG) injection   SubCUTAneous AC&HS    glucose chewable tablet 16 g  4 Tab Oral PRN    dextrose (D50W) injection syrg 12.5-25 g  12.5-25 g IntraVENous PRN    glucagon (GLUCAGEN) injection 1 mg  1 mg IntraMUSCular PRN    albuterol-ipratropium (DUO-NEB) 2.5 MG-0.5 MG/3 ML  3 mL Nebulization QID RT    labetalol (NORMODYNE;TRANDATE) injection 5 mg  5 mg IntraVENous Q10MIN PRN    insulin glargine (LANTUS) injection 12 Units  12 Units SubCUTAneous PCD    sodium chloride (NS) flush 5-40 mL  5-40 mL IntraVENous Q8H    sodium chloride (NS) flush 5-40 mL  5-40 mL IntraVENous PRN    acetaminophen (TYLENOL) tablet 650 mg  650 mg Oral Q4H PRN    Or    acetaminophen (TYLENOL) solution 650 mg  650 mg Per NG tube Q4H PRN    Or    acetaminophen (TYLENOL) suppository 650 mg  650 mg Rectal Q4H PRN    aspirin chewable tablet 81 mg  81 mg Oral DAILY    enoxaparin (LOVENOX) injection 40 mg  40 mg SubCUTAneous Q12H         Review of Symptoms:  Constitutional: negative  Eyes: negative  Ears, nose, mouth, throat, and face: negative  Respiratory: negative  Cardiovascular: negative  Gastrointestinal: negative  Genitourinary:negative  Musculoskeletal:negative  Neurological: negative  Endocrine: negative     Subjective:      Visit Vitals  BP (!) 158/96 (BP 1 Location: Left arm, BP Patient Position: At rest)   Pulse 83   Temp 98.1 °F (36.7 °C)   Resp 18   SpO2 94%       Physical:  Heart: Regular, S1 WNL and S2 WNL.  No S3 or S4, no m/S3/JVD, no carotid bruits   Lungs: clear   Abdomen: Soft, +BS, NTND   Extremities: LE cliff +DP/PT, no edema   Neurologic: drowsy but arouses to stimuli. Data Review:   Recent Labs     01/08/19  0404 01/07/19  1138   WBC 10.5 13.2*   HGB 12.2 14.3   HCT 40.6 48.2*    264     Recent Labs     01/09/19  0319 01/08/19  0404 01/07/19  1138    137 139   K 3.9 4.0 4.2   CL 97 97 97   CO2 32 32 33*   * 226* 126*   BUN 25* 27* 20   CREA 1.15* 1.67* 1.21*   CA 8.5 8.7 9.6   MG 1.8  --   --    ALB  --   --  3.6   TBILI  --   --  0.7   SGOT  --   --  20   ALT  --   --  17       Recent Labs     01/08/19  1222 01/08/19  0100 01/07/19  1138   TROIQ 0.26* 0.36* 0.12*         Intake/Output Summary (Last 24 hours) at 1/9/2019 1103  Last data filed at 1/9/2019 0037  Gross per 24 hour   Intake 480 ml   Output 325 ml   Net 155 ml        Cardiographics    Telemetry: sinus rhythm, ventricular rate 92     Echocardiogram: Left ventricle: Systolic function was normal. Ejection fraction was  estimated in the range of 55 % to 60 %. No obvious wall motion  abnormalities identified in the views obtained. Right ventricle: The ventricle was mildly dilated. Left atrium: The atrium was mildly dilated. Right atrium: The atrium was mildly dilated. Inferior vena cava, hepatic veins: The inferior vena cava was dilated. Assessment:            Active Problems:    ANN (acute kidney injury) ([facility] [address] (3/15/2018)      CVA (cerebral vascular accident) ([facility] [address] (1/7/2019)      Elevated troponin (1/8/2019)         Plan:     Lindsay Langley is a 59year old female with CVA, elevated troponin, and HTN. We were asked to consult for possible ILR with recent MRI of brain showing small acute R occipital lobe infarct. Sister reports frequent falls, ? Baseline neurologic status. Does not appear to be a candidate for [address] if AF is detected. Sister (POA) agrees. She is not a good candidate for ILR. Discussed with Dr Twin Koo. Thank you for this interesting consultation. Sindy Betancur ANP    Patient seen and examined by me with nurse practitioner. I personally performed all components of the history, physical, and medical decision making and agree with the assessment and plan with minor modifications as noted. Pt with cryptogenic cva. Sister at bedside - frequent falls. Even if ILR detected PAF, not a great candidate for oac due to falls. Accordingly, no point in implanting ILR. D/w Dr Ortega Mention - in agreement.  Please call with ?s    Delfina Quijano MD, [facility]

## 2019-01-09 NOTE — DIABETES MGMT
DTC Progress Note Recommendations/ Comments: If appropriate, please consider:  
1) changing correctional insulin to normal sensitivity with consideration for her home correction dosing at 1 unit:10 mg/dl >150 mg/dl and renal status. 2) starting Lispro 3 units prandial insulin dose ac tid (60% of home dose). Current hospital DM medication: Lantus 12 units daily, Lispro high sensitivity correctional insulin ac and hs. Chart reviewed on Jayne Lizama due to BG >180 mg/dl. Patient is a 59 y.o. female with known DM on Lantus 12 units daily and Novolog 5 units ac plus 5 units:50 mg/dl > 150 mg/dl tid at home. A1c:  
Lab Results Component Value Date/Time Hemoglobin A1c 7.0 (H) 11/15/2018 04:51 AM  
 Hemoglobin A1c 8.0 (H) 03/16/2018 01:36 AM  
 
 
Recent Glucose Results:  
Lab Results Component Value Date/Time  (H) 01/09/2019 03:19 AM  
 GLUCPOC 292 (H) 01/09/2019 11:33 AM  
 GLUCPOC 206 (H) 01/09/2019 06:21 AM  
 GLUCPOC 187 (H) 01/08/2019 09:32 PM  
  
 
Lab Results Component Value Date/Time Creatinine 1.15 (H) 01/09/2019 03:19 AM  
 
CrCl cannot be calculated (Unknown ideal weight. ). Active Orders Diet DIET DIABETIC CONSISTENT CARB Regular PO intake:  
Patient Vitals for the past 72 hrs: 
 % Diet Eaten 01/08/19 1800 100 % Will continue to follow as needed. Thank you Monica Ashley RD CDE [facility] Time spent: 10 minutes

## 2019-01-09 NOTE — PROGRESS NOTES
Problem: Falls - Risk of 
Goal: *Absence of Falls Document Raúl Roqueod Fall Risk and appropriate interventions in the flowsheet. Outcome: Progressing Towards Goal 
Fall Risk Interventions: 
Mobility Interventions: PT Consult for mobility concerns Mentation Interventions: Door open when patient unattended Medication Interventions: Teach patient to arise slowly, Patient to call before getting OOB Elimination Interventions: Toileting schedule/hourly rounds, Patient to call for help with toileting needs History of Falls Interventions: Investigate reason for fall, Bed/chair exit alarm Problem: Pressure Injury - Risk of 
Goal: *Prevention of pressure injury Document Cyrus Scale and appropriate interventions in the flowsheet. Pressure Injury Interventions: 
Sensory Interventions: Assess changes in LOC Moisture Interventions: Absorbent underpads, Internal/External urinary devices Activity Interventions: Increase time out of bed, PT/OT evaluation Mobility Interventions: PT/OT evaluation Nutrition Interventions: Document food/fluid/supplement intake Friction and Shear Interventions: HOB 30 degrees or less, Lift sheet, Minimize layers

## 2019-01-09 NOTE — PROGRESS NOTES
* No surgery found * 
* No surgery found * Bedside and Verbal shift change report given to Emanuel Jalloh (oncoming nurse) by Ugo Marrero RN (offgoing nurse). Report included the following information SBAR, Kardex, Recent Results, Med Rec Status and Cardiac Rhythm SR. Zone Phone:   [phone] Significant changes during shift:  Wound consult ordered to assess Buttocks. Sister here and is signing Durable DNR inc of large amount urine. PCXR done. O2 weaned down to 2l for a few hours with sats 91% but then dropped to 87% O2 back up to 4l and sats 95% Dr Rosalinda Martinez aware and orders for D-Dimer Patient Information Sammi David 59 y.o. 
1/7/2019  6:03 PM by Marvin Odonnell DO. Sammi David was admitted from Home 
 
Problem List 
 
Patient Active Problem List  
 Diagnosis Date Noted  Elevated troponin 01/08/2019  CVA (cerebral vascular accident) ([address] 01/07/2019  COPD exacerbation ([address] 11/14/2018  
 NSTEMI (non-ST elevated myocardial infarction) ([address] 11/14/2018  Acute respiratory failure with hypoxia ([address] 11/14/2018  DKA (diabetic ketoacidoses) ([address] 03/15/2018  ANN (acute kidney injury) ([address] 03/15/2018  Venous stasis dermatitis 04/25/2014  Venous stasis syndrome 04/25/2014  IDDM (insulin dependent diabetes mellitus) ([address]  Morbid obesity ([address]  Venous insufficiency Past Medical History:  
Diagnosis Date  Arthritis  Asthma  Bronchitis  Developmental delay  GERD (gastroesophageal reflux disease)  Hypercholesterolemia  Hypertension  Hypothyroid  IDDM (insulin dependent diabetes mellitus) ([address]  Morbid obesity ([address]  Peripheral neuropathy  Thyroid disease  Venous insufficiency Core Measures: CVA: Yes Yes CHF:No No 
PNA:No No 
 
Post Op Surgical (If Applicable):  
 
Number times ambulated in hallway past shift:  0 Number of times OOB to chair past shift:   0 
NG Tube: No 
Incentive Spirometer: No 
 Drains: No   Volume  0 Dressing Present:  No 
Flatus:  Not applicable Activity Status: OOB to Chair No 
Ambulated this shift No  
Bed Rest No 
 
Supplemental O2: (If Applicable) NC Yes NRB No 
Venti-mask No 
On 4 Liters/min LINES AND DRAINS: 
Central Line? No  
PICC LINE? No  
Urinary Catheter? No 
DVT prophylaxis: DVT prophylaxis Med- Yes DVT prophylaxis SCD or CARINE- No  
 
Wounds: (If Applicable) Wounds- Yes Location both legs covered with dressings Patient Safety: 
 
Falls Score Total Score: 4 Safety Level_______ Bed Alarm On? Yes Sitter? No 
 
Plan for upcoming shift: STAT d dimer. Wound care to see regarding blister on buttocks. Turn every two hours Discharge Plan: TBD Active Consults: 
IP CONSULT TO NEUROLOGY 
IP CONSULT TO CARDIOLOGY 
IP CONSULT TO ELECTROPHYSIOLOGY

## 2019-01-09 NOTE — PROGRESS NOTES
* No surgery found * 
* No surgery found * Bedside and Verbal shift change report given to Price Arellano (oncoming nurse) by Katia Da Silva RN (offgoing nurse). Report included the following information SBAR, Kardex, Recent Results, Med Rec Status and Cardiac Rhythm SR. Zone Phone:   [phone] Significant changes during shift:  Admission Patient Information Raúl Rosa 59 y.o. 
1/7/2019  6:03 PM by Nick Cedillo DO. Raúl Rosa was admitted from Home 
 
Problem List 
 
Patient Active Problem List  
 Diagnosis Date Noted  Elevated troponin 01/08/2019  CVA (cerebral vascular accident) ([address] 01/07/2019  COPD exacerbation ([address] 11/14/2018  
 NSTEMI (non-ST elevated myocardial infarction) ([address] 11/14/2018  Acute respiratory failure with hypoxia ([address] 11/14/2018  DKA (diabetic ketoacidoses) ([address] 03/15/2018  ANN (acute kidney injury) ([address] 03/15/2018  Venous stasis dermatitis 04/25/2014  Venous stasis syndrome 04/25/2014  IDDM (insulin dependent diabetes mellitus) ([address]  Morbid obesity ([address]  Venous insufficiency Past Medical History:  
Diagnosis Date  Arthritis  Asthma  Bronchitis  Developmental delay  GERD (gastroesophageal reflux disease)  Hypercholesterolemia  Hypertension  Hypothyroid  IDDM (insulin dependent diabetes mellitus) ([address]  Morbid obesity ([address]  Peripheral neuropathy  Thyroid disease  Venous insufficiency Core Measures: CVA: Yes Yes CHF:No No 
PNA:No No 
 
Post Op Surgical (If Applicable):  
 
Number times ambulated in hallway past shift:  0 Number of times OOB to chair past shift:   0 
NG Tube: No 
Incentive Spirometer: No 
Drains: No   Volume  0 Dressing Present:  No 
Flatus:  Not applicable Activity Status: OOB to Chair No 
Ambulated this shift No  
Bed Rest No 
 
Supplemental O2: (If Applicable) NC Yes NRB No 
Venti-mask No 
On 4 Liters/min LINES AND DRAINS: 
 Central Line? No  
PICC LINE? No  
Urinary Catheter? No 
DVT prophylaxis: DVT prophylaxis Med- Yes DVT prophylaxis SCD or CARINE- No  
 
Wounds: (If Applicable) Wounds- Yes Location both legs covered with dressings Patient Safety: 
 
Falls Score Total Score: 4 Safety Level_______ Bed Alarm On? Yes Sitter? No 
 
Plan for upcoming shift: safety, neurochecks every 4 hours Discharge Plan: Yes TBD Active Consults: 
IP CONSULT TO NEUROLOGY 
IP CONSULT TO CARDIOLOGY

## 2019-01-09 NOTE — PROGRESS NOTES
Sister asked to speak with CM, they live in [place] and would like for her to go to a close place to where they live if possible. Darline, primary nurse called and spoke with Berenice to let her know sister is in the room and would like to speak with her. CM stated that she would speak with sister.

## 2019-01-09 NOTE — PROGRESS NOTES
[address] [place] [address]  [phone] Cardiology Progress Note 
 
 
1/9/2019 10:20 AM 
 
Admit Date: 1/7/2019 Admit Diagnosis:  
CVA (cerebral vascular accident) ([address] Subjective:  
 
Dorie Mccoy has no complaints of SOB, CP. No arrhythmias on telemetry. BP elevated, but Visit Vitals BP (!) 158/96 (BP 1 Location: Left arm, BP Patient Position: At rest) Pulse 83 Temp 98.1 °F (36.7 °C) Resp 18 SpO2 94% Current Facility-Administered Medications Medication Dose Route Frequency  gabapentin (NEURONTIN) capsule 600 mg  600 mg Oral QHS  levothyroxine (SYNTHROID) tablet 150 mcg  150 mcg Oral ACB  albuterol-ipratropium (DUO-NEB) 2.5 MG-0.5 MG/3 ML  3 mL Nebulization Q6H PRN  
 guaiFENesin (ROBITUSSIN) 100 mg/5 mL oral liquid 100 mg  100 mg Oral Q6H PRN  
 insulin lispro (HUMALOG) injection   SubCUTAneous AC&HS  
 glucose chewable tablet 16 g  4 Tab Oral PRN  
 dextrose (D50W) injection syrg 12.5-25 g  12.5-25 g IntraVENous PRN  
 glucagon (GLUCAGEN) injection 1 mg  1 mg IntraMUSCular PRN  
 albuterol-ipratropium (DUO-NEB) 2.5 MG-0.5 MG/3 ML  3 mL Nebulization QID RT  
 labetalol (NORMODYNE;TRANDATE) injection 5 mg  5 mg IntraVENous Q10MIN PRN  
 insulin glargine (LANTUS) injection 12 Units  12 Units SubCUTAneous PCD  sodium chloride (NS) flush 5-40 mL  5-40 mL IntraVENous Q8H  
 sodium chloride (NS) flush 5-40 mL  5-40 mL IntraVENous PRN  
 acetaminophen (TYLENOL) tablet 650 mg  650 mg Oral Q4H PRN Or  
 acetaminophen (TYLENOL) solution 650 mg  650 mg Per NG tube Q4H PRN Or  
 acetaminophen (TYLENOL) suppository 650 mg  650 mg Rectal Q4H PRN  
 aspirin chewable tablet 81 mg  81 mg Oral DAILY  enoxaparin (LOVENOX) injection 40 mg  40 mg SubCUTAneous Q12H Objective:  
  
Physical Exam: 
General Appearance:   
Chest:   Clear Cardiovascular:  Regular rate and rhythm, no murmur.  
 Abdomen:   Soft, non-tender, bowel sounds are active.  
Extremities:  
Skin:  Warm and dry.  
 
Data Review:  
Recent Labs 01/08/19 
0404 01/07/19 
1138 WBC 10.5 13.2* HGB 12.2 14.3 HCT 40.6 48.2*  
 264 Recent Labs 01/09/19 
0319 01/08/19 
0404 01/07/19 
1138  137 139  
K 3.9 4.0 4.2 CL 97 97 97 CO2 32 32 33* * 226* 126* BUN 25* 27* 20  
CREA 1.15* 1.67* 1.21* CA 8.5 8.7 9.6 MG 1.8  --   --   
ALB  --   --  3.6 TBILI  --   --  0.7 SGOT  --   --  20 ALT  --   --  17 Recent Labs 01/08/19 
1222 01/08/19 
0100 01/07/19 
1138 TROIQ 0.26* 0.36* 0.12* Intake/Output Summary (Last 24 hours) at 1/9/2019 1020 Last data filed at 1/9/2019 9980 Gross per 24 hour Intake 480 ml Output 325 ml Net 155 ml Telemetry:  
EKG: 
Cxray: 
 
Assessment:  
 
Active Problems: 
  ANN (acute kidney injury) ([facility] (3/15/2018) CVA (cerebral vascular accident) ([facility] (1/7/2019) Elevated troponin (1/8/2019) Plan:  
 
Elevated troponin: Not ACS, r/t CVA and ANN. Cath 11/18 showed non obstructive disease No further cardiology evaluation CVA and risk factors for afib: 
No arrhythmias on tele With new CVA, providing a LINQ is beneficial to the patient as it increased detection of PAF from 2 to 12% over standard medical monitoring in cryptogenic CVA at 1 year (NEJM 2014). Consult EP for LINQ placement evaluation 
  
HTN: 
Permissible with new CVA Patient was discharge in 11/2018 on metoprolol 12.5mg BID, but has not been taking and is not on PTA meds If suspicious of afib, would like to start BB once Neurology [facility] 1/9/2019 Patient seen, examined by me personally. Plan discussed as detailed. Agree with note as outlined by  NP. I confirm findings in history and physical exam. No additional findings noted. Agree with plan as outlined above. Quincy Welch MD

## 2019-01-09 NOTE — CONSULTS
NEUROLOGY NOTE         DATE OF CONSULTATION: 1/9/2019    CONSULTED BY: Lizette Duenas DO    Chief Complaint   Patient presents with    Headache       Reason for Consult  I have been asked to see the patient in neurological consultation to render advice and opinion regarding stroke    HISTORY OF PRESENT ILLNESS  Janina Dorsey, 59 y.o. female with PMHx significant for developmental delay, IDDM, HTN, GERD, hypothyroid, venous insufficiency, presents as transfer from [facility] to the ED with cc of HA, n/v, and cough for unknown onset. Pt arrived at [facility] uncooperative. Pt was transferred from [facility] to here due to MRI of brain showing small acute R occipital lobe infarct,     Hx and exam  limited due to hx of developmental delay and poor cooperation. She is complaining of a she denies any new problem with headache,  vision, no limb weakness or numbness. Today she is back to baseline according to her sister. ROS  POSITIVES ARE RED  Review of Systems unreliable  Constitutional: chills and fever. HENT:  ear pain. Eyes:  pain and discharge. Respiratory: cough and hemoptysis. Cardiovascular:chest pain and claudication. Gastrointestinal:  constipation and diarrhea. Genitourinary:  flank pain and hematuria. Musculoskeletal:falls and myalgias. Negative for back pain. Skin:  itching and rash. Neurological:  sensory change and focal weakness. Negative for headaches. Endo/Heme/Allergies:  environmental allergies. Does not bruise/bleed easily.    Psychiatric/Behavioral:  depression and hallucinations.           PMH  Past Medical History:   Diagnosis Date    Arthritis     Asthma     Bronchitis     Developmental delay     GERD (gastroesophageal reflux disease)     Hypercholesterolemia     Hypertension     Hypothyroid     IDDM (insulin dependent diabetes mellitus) (HCC)     Morbid obesity (HCC)     Peripheral neuropathy     Thyroid disease     Venous insufficiency        FH  Family History Problem Relation Age of Onset    COPD Father     Alcohol abuse Father        SH  Social History     Socioeconomic History    Marital status: SINGLE     Spouse name: Not on file    Number of children: Not on file    Years of education: Not on file    Highest education level: Not on file   Tobacco Use    Smoking status: Current Every Day Smoker     Packs/day: 0.50     Types: Cigarettes     Last attempt to quit: 2012     Years since quittin.0    Smokeless tobacco: Never Used   Substance and Sexual Activity    Alcohol use: Yes     Comment: social    Drug use: No       ALLERGIES  No Known Allergies    PHYSICAL EXAM  EXAMINATION:   Patient Vitals for the past 24 hrs:   Temp Pulse Resp BP SpO2   19 1236     96 %   19 1234 98.2 °F (36.8 °C) 84 18 132/62 (!) 87 %   19 1220     90 %   19 1130     91 %   19 0853     94 %   19 0750 98.1 °F (36.7 °C) 83 18 (!) 158/96 95 %   19 0319 98.4 °F (36.9 °C) 78 18 (!) 160/95 97 %   19 2340 98.3 °F (36.8 °C) 73 18 146/70 98 %   19 2014     96 %   19 1908 99.3 °F (37.4 °C) 72 18 132/52 95 %   19 1715 98.8 °F (37.1 °C) 77 18 149/72 93 %   19 1506 98.4 °F (36.9 °C)            General:   Physical Exam   CONSTITUTIONAL: Oriented to person, place, and time, appears well-developed and well-nourished. No distress. Head: Normocephalic and atraumatic. Neurological Examination:   Mental Status:  AAO x3. Speech is fluent. Follows simple commands, limited understanding of current problems  Cranial Nerves: Uncooperative PERRL,Facial movement intact, symmetric. Hearing intact to conversation. Motor: Strength is 5/5 in all 4 ext. Sensation: Normal to pain    Reflexes: DTRs 2ymmetric throughout. Plantar responses downgoing.      Coordination/Cerebellar: NL    Gait: baseline    LAB DATA REVIEWED:    Results for orders placed or performed during the hospital encounter of 01/07/19   TROPONIN I   Result Value Ref Range    Troponin-I, Qt. 0.36 (H) <0.05 ng/mL   CBC WITH AUTOMATED DIFF   Result Value Ref Range    WBC 10.5 3.6 - 11.0 K/uL    RBC 5.08 3.80 - 5.20 M/uL    HGB 12.2 11.5 - 16.0 g/dL    HCT 40.6 35.0 - 47.0 %    MCV 79.9 (L) 80.0 - 99.0 FL    MCH 24.0 (L) 26.0 - 34.0 PG    MCHC 30.0 30.0 - 36.5 g/dL    RDW 16.4 (H) 11.5 - 14.5 %    PLATELET 309 714 - 806 K/uL    MPV 10.3 8.9 - 12.9 FL    NRBC 0.0 0  WBC    ABSOLUTE NRBC 0.00 0.00 - 0.01 K/uL    NEUTROPHILS 79 (H) 32 - 75 %    LYMPHOCYTES 11 (L) 12 - 49 %    MONOCYTES 9 5 - 13 %    EOSINOPHILS 0 0 - 7 %    BASOPHILS 0 0 - 1 %    IMMATURE GRANULOCYTES 1 (H) 0.0 - 0.5 %    ABS. NEUTROPHILS 8.4 (H) 1.8 - 8.0 K/UL    ABS. LYMPHOCYTES 1.1 0.8 - 3.5 K/UL    ABS. MONOCYTES 0.9 0.0 - 1.0 K/UL    ABS. EOSINOPHILS 0.0 0.0 - 0.4 K/UL    ABS. BASOPHILS 0.0 0.0 - 0.1 K/UL    ABS. IMM.  GRANS. 0.1 (H) 0.00 - 0.04 K/UL    DF AUTOMATED     METABOLIC PANEL, BASIC   Result Value Ref Range    Sodium 137 136 - 145 mmol/L    Potassium 4.0 3.5 - 5.1 mmol/L    Chloride 97 97 - 108 mmol/L    CO2 32 21 - 32 mmol/L    Anion gap 8 5 - 15 mmol/L    Glucose 226 (H) 65 - 100 mg/dL    BUN 27 (H) 6 - 20 MG/DL    Creatinine 1.67 (H) 0.55 - 1.02 MG/DL    BUN/Creatinine ratio 16 12 - 20      GFR est AA 38 (L) >60 ml/min/1.73m2    GFR est non-AA 31 (L) >60 ml/min/1.73m2    Calcium 8.7 8.5 - 10.1 MG/DL   TROPONIN I   Result Value Ref Range    Troponin-I, Qt. 0.26 (H) <4.12 ng/mL   METABOLIC PANEL, BASIC   Result Value Ref Range    Sodium 136 136 - 145 mmol/L    Potassium 3.9 3.5 - 5.1 mmol/L    Chloride 97 97 - 108 mmol/L    CO2 32 21 - 32 mmol/L    Anion gap 7 5 - 15 mmol/L    Glucose 182 (H) 65 - 100 mg/dL    BUN 25 (H) 6 - 20 MG/DL    Creatinine 1.15 (H) 0.55 - 1.02 MG/DL    BUN/Creatinine ratio 22 (H) 12 - 20      GFR est AA 58 (L) >60 ml/min/1.73m2    GFR est non-AA 48 (L) >60 ml/min/1.73m2    Calcium 8.5 8.5 - 10.1 MG/DL   MAGNESIUM   Result Value Ref Range    Magnesium 1.8 1.6 - 2.4 mg/dL   LIPID PANEL   Result Value Ref Range    LIPID PROFILE          Cholesterol, total 129 <200 MG/DL    Triglyceride 87 <150 MG/DL    HDL Cholesterol 63 MG/DL    LDL, calculated 48.6 0 - 100 MG/DL    VLDL, calculated 17.4 MG/DL    CHOL/HDL Ratio 2.0 0 - 5.0     GLUCOSE, POC   Result Value Ref Range    Glucose (POC) 210 (H) 65 - 100 mg/dL    Performed by JIMBO MANJARREZ(CON)    GLUCOSE, POC   Result Value Ref Range    Glucose (POC) 207 (H) 65 - 100 mg/dL    Performed by Piyush Cage (PCT)    GLUCOSE, POC   Result Value Ref Range    Glucose (POC) 187 (H) 65 - 100 mg/dL    Performed by Mary Alice Howe (PCT)    GLUCOSE, POC   Result Value Ref Range    Glucose (POC) 206 (H) 65 - 100 mg/dL    Performed by Mary Alice Howe (PCT)    GLUCOSE, POC   Result Value Ref Range    Glucose (POC) 292 (H) 65 - 100 mg/dL    Performed by Marsha Jair (PCT)         Imaging review:  See below. Stroke workup    MRI Brain  FINDINGS:   Motion limits examination.     The ventricles are normal in size and configuration without hydrocephalus. There  is no mass effect or midline shift. There is no extra-axial fluid collection.      There is an area of abnormal restricted diffusion in the paramedian right  occipital lobe.     There are scattered foci of hyperintense T2/flair signal in the periventricular  white matter in keeping with chronic microvascular ischemic disease. There is no  abnormal gradient susceptibility.     The major intracranial vascular flow-voids are patent. The midline structures,  including the cervicomedullary junction, are within normal limits.      There is mucosal thickening in the right maxillary sinus and to a lesser extent  the right sphenoid sinus.     IMPRESSION  IMPRESSION:  1.  Small acute right occipital lobe infarct.     2. Mild chronic microvascular ischemic changes in the periventricular white  matter.     CAROTID DOPPLERS 1/8/18  Normal for age    TTE: 1/8/18 Normal for age    Stroke labs:  HgBA1c    Lab Results   Component Value Date/Time    Hemoglobin A1c 7.0 (H) 11/15/2018 04:51 AM     LDL   Lab Results   Component Value Date/Time    LDL, calculated 48.6 2019 03:19 AM       HOME MEDS  Prior to Admission Medications   Prescriptions Last Dose Informant Patient Reported? Taking?   aspirin delayed-release 81 mg tablet  Parent Yes No   Sig: Take 81 mg by mouth daily. gabapentin (NEURONTIN) 300 mg capsule  Parent Yes No   Sig: Take 600 mg by mouth nightly. insulin aspart U-100 (NOVOLOG FLEXPEN U-100 INSULIN) 100 unit/mL inpn  Self No No   Si Units by SubCUTAneous route Before breakfast, lunch, and dinner. + SSI TIDAC:  140-199=2 u            200-249=3 u  250-299=5 u  300-349=8 u   Patient taking differently: 5 Units by SubCUTAneous route Before breakfast, lunch, and dinner. Sliding Scale   under none  150-200 5 units  201-250 10 units  251-300 15 units  301-350 20 units  351-400 25 units  over 400 call MD   insulin glargine (LANTUS) 100 unit/mL injection  Parent Yes No   Si Units by SubCUTAneous route daily (after dinner). Patient takes at 1830 each day   ipratropium-albuterol (COMBIVENT RESPIMAT)  mcg/actuation inhaler  Parent Yes No   Sig: Take 1 Puff by inhalation every six (6) hours as needed for Shortness of Breath. levothyroxine (SYNTHROID) 150 mcg tablet  Parent Yes No   Sig: Take 150 mcg by mouth Daily (before breakfast). potassium chloride (K-DUR, KLOR-CON) 20 mEq tablet   Yes No   Sig: Take 20 mEq by mouth every other day. Alternates day with torsemide   silver sulfADIAZINE (SILVADENE) 1 % topical cream  Self Yes No   Sig: Apply 1 Each to affected area every Monday, Wednesday, Friday. Apply to legs   torsemide (DEMADEX) 20 mg tablet  Self Yes No   Sig: Take 20 mg by mouth every other day. Alternates administration w/ potassium supplement   zinc 50 mg tab tablet  Self Yes No   Sig: Take 50 mg by mouth daily.       Facility-Administered Medications: None       CURRENT MEDS  Current Facility-Administered Medications   Medication Dose Route Frequency    [START ON 1/10/2019] torsemide (DEMADEX) tablet 20 mg  20 mg Oral EVERY OTHER DAY    gabapentin (NEURONTIN) capsule 600 mg  600 mg Oral QHS    levothyroxine (SYNTHROID) tablet 150 mcg  150 mcg Oral ACB    insulin lispro (HUMALOG) injection   SubCUTAneous AC&HS    albuterol-ipratropium (DUO-NEB) 2.5 MG-0.5 MG/3 ML  3 mL Nebulization QID RT    insulin glargine (LANTUS) injection 12 Units  12 Units SubCUTAneous PCD    sodium chloride (NS) flush 5-40 mL  5-40 mL IntraVENous Q8H    aspirin chewable tablet 81 mg  81 mg Oral DAILY    enoxaparin (LOVENOX) injection 40 mg  40 mg SubCUTAneous Q12H       IMPRESSION:RECOMMENDATIONS:  Lindsay Langley is a 59 y.o. female who presents with right occipital infarct, stroke w/u including MRA head and neck, 2D Echo Heart, Stroke labs, all normal, agree with ASA 81 mg/day. Cong Brandt. Twin Koo MD  Neurologist      This note will not be viewable in [address]

## 2019-01-09 NOTE — PROGRESS NOTES
Reason for Admission:   Patient was transfer from [facility] to [facility] ED for headache, nausea, vomiting and cough. RRAT Score:    25 Resources/supports as identified by patient/family:   Patient has supportive sister and family. Top Challenges facing patient (as identified by patient/family and CM): Finances/Medication cost?    Patient has [facility] medicare and medicaid. Transportation? Her sister or boyfriend transports her to appointments or shopping. Support system or lack thereof? She has supportive family. Her boyfriend comes over on the weekends and then goes home on Sunday. Living arrangements? She lives in apartment alone and has caregivers who come in from 0830 to 1400pm daily. They do light housekeeping and cooking. She uses a walker at home or when she goes out. Self-care/ADLs/Cognition? Sister states patient showers on M,W,F. The caregivers watch her however she does her on care. Per sister states she has hx falls . Current Advanced Directive/Advance Care Plan: On file Plan for utilizing home health:    Per sister states she has not used home health services or rehab in the past.  
                   
Likelihood of readmission:  Based on comorbidities -- high Transition of Care Plan:  Discussed dcp with patient and her sister and they would like to go to rehab. Choice given and they chose [facility]. Referral sent and will await their response. Care Management Interventions PCP Verified by CM: Yes Transition of Care Consult (CM Consult): Discharge Planning Discharge Durable Medical Equipment: (Patient has walker at home. ) Physical Therapy Consult: Yes Occupational Therapy Consult: Yes Speech Therapy Consult: Yes Current Support Network: Lives Alone Confirm Follow Up Transport: Family Plan discussed with Pt/Family/Caregiver: Yes Discharge Location Discharge Placement: Home

## 2019-01-09 NOTE — PROGRESS NOTES
TRANSFER - IN REPORT: 
 
Verbal report received from [facility] RN(name) on Raúl Rosa  being received from ER(unit) for routine progression of care Report consisted of patients Situation, Background, Assessment and  
Recommendations(SBAR). Information from the following report(s) SBAR, Kardex, Recent Results, Med Rec Status and Cardiac Rhythm SR was reviewed with the receiving nurse. Opportunity for questions and clarification was provided. Assessment completed upon patients arrival to unit and care assumed.

## 2019-01-09 NOTE — PROGRESS NOTES
Primary Nurse Gary Horan RN and Zabrina Johnson RN performed a dual skin assessment on this patient presents with a fluid filled blister in the gluteal fold and excoriation to bilateral back of legs just distal to her buttocks. 9350 pt refused Lovenox injection. 0730 Bedside and Verbal shift change report given to Toñito Bauer (oncoming nurse) by Fernando Wagner (offgoing nurse). Report included the following information SBAR, Kardex, ED Summary, Intake/Output, MAR, Recent Results and Cardiac Rhythm normal sinus.

## 2019-01-09 NOTE — PROGRESS NOTES
Hospitalist Progress NoteNAME: Vani Rosenberg :  1960 MRN:  [id number] Interim Hospital Summary: 59 y.o. female whom presented on 2019 with Assessment / Plan: 
 
Acute right foot pain, suspect gout -no recent trauma and has history of gout. Her pain started last night and is similar to her prior gout attack. On exam, dorsal lateral aspect of her foot is very tender, warm with erythema.   
-will order colchicine 1.2mg x 1 
-PT OT eval and treat Hypoxemia, currently 2-4LNC Aspiration pneumonitis? Hx COPD, not in exacerbation 
-patient vomited several times at the other hospital just prior to being transferred. Sister calls one event when patient vomited while wearing a face mask.    
-get CXR 
-try wean off oxygen. May need oxygen at discharge to home vs rehab 
-hold on abx with no fever or leukocytosis. Cough is minimally productive 
-not wheezing. Continue nebs but hold on steroids 
-patient had bronchitis type symptoms prior to admission. Monitor Addendem 4pm 
-CXR with RLL atelectasis. Will order incentive spirometer 
-check D D dimer and CTA if abnormal 
 
Right occipital CVA Transferred from [facility] for  acute Rt occipital CVA Presents with nausea and dizziness 
- CT head Questionable, small, right occipital infarction - MRI brain Small acute right occipital lobe infarct.   
- carotid US pending 
- Echocardiogram pending - Telemetry to screen for atrial fibrillation - Permissive HTN (SBP<220/<120) for 24 hrs from symptom onset and then adjust medications for BP goal is less than 140/90 
- HgBA1c  
- LDL pending. Start statin if LDL >70  
- Continue ASA 81 mg daily 
- ST/OT/PT eval and treat 
- Neurology consult 
  
Troponin elevation CAD, hx NSTEMI 2018 
-consult cardiology Headache, tension type 
-fioricet prn ANN, resolved 
-Renal US 2018 was unremarkable -related to volume depletion in the setting of vomiting and demadex use 
-can restart demadex tomorrow or at discharge DM2 
-SSI prn Ex smoker 
  
Chronic lymphedema / venous stasis Wound care consult Restarting demadex 
  
Hypothyroid Cont synthroid 
  
Code Status: Full Surrogate Decision Maker:  [phone] 
  
DVT Prophylaxis: lovenox GI Prophylaxis: not indicated Subjective: Chief Complaint / Reason for Physician Visit Follow up of CVA, bronchitis, lymphedema, COPD Chart reviewed in detail. Discussed with RN events overnight. Met with sister at bedside. Concerned about right foot pain, cough and oxygen needs. Review of Systems: 
Symptom Y/N Comments  Symptom Y/N Comments Fever/Chills    Chest Pain Poor Appetite    Edema n   
Cough y   Abdominal Pain Sputum    Joint Pain SOB/RAMIREZ y   Pruritis/Rash Nausea/vomit n  resolved  Tolerating PT/OT Diarrhea    Tolerating Diet Constipation    Other Could NOT obtain due to:   
 
PO intake:  
Patient Vitals for the past 72 hrs: 
 % Diet Eaten 01/08/19 1800 100 % Objective: VITALS:  
Last 24hrs VS reviewed since prior progress note. Most recent are: 
Patient Vitals for the past 24 hrs: 
 Temp Pulse Resp BP SpO2  
01/09/19 1236     96 % 01/09/19 1234 98.2 °F (36.8 °C) 84 18 132/62 (!) 87 % 01/09/19 1220     90 % 01/09/19 1130     91 % 01/09/19 0853     94 % 01/09/19 0750 98.1 °F (36.7 °C) 83 18 (!) 158/96 95 % 01/09/19 0319 98.4 °F (36.9 °C) 78 18 (!) 160/95 97 % 01/08/19 2340 98.3 °F (36.8 °C) 73 18 146/70 98 % 01/08/19 2014     96 % 01/08/19 1908 99.3 °F (37.4 °C) 72 18 132/52 95 % 01/08/19 1715 98.8 °F (37.1 °C) 77 18 149/72 93 % 01/08/19 1506 98.4 °F (36.9 °C)     Intake/Output Summary (Last 24 hours) at 1/9/2019 1311 Last data filed at 1/9/2019 0931 Gross per 24 hour Intake 480 ml Output 325 ml Net 155 ml PHYSICAL EXAM: 
 General: WD, WN. Alert, cooperative, no acute distress   
EENT:  EOMI. Anicteric sclerae. MMM Resp:  Bilateral coarse BS, no wheezing. No accessory muscle use CV:  Regular  rhythm,  No edema GI:  Soft, Non distended, Non tender.  +Bowel sounds Neurologic:  Alert and oriented X 3, normal speech,  Grossly no focal 
Psych:   Fair insight. Not anxious nor agitated Skin:  No rashes. No jaundice Reviewed most current lab test results and cultures  YES Reviewed most current radiology test results   YES Review and summation of old records today    NO Reviewed patient's current orders and MAR    YES 
PMH/SH reviewed - no change compared to H&P 
________________________________________________________________________ Care Plan discussed with: 
  Comments Patient x Family RN Care Manager Consultant Multidiciplinary team rounds were held today with , nursing, pharmacist and clinical coordinator. Patient's plan of care was discussed; medications were reviewed and discharge planning was addressed. ________________________________________________________________________ Total NON critical care TIME:   25   Minutes Total CRITICAL CARE TIME Spent:   Minutes non procedure based Comments >50% of visit spent in counseling and coordination of care x This includes time during multidisciplinary rounds if indicated above  
________________________________________________________________________ Corbin Shipley MD  
 
Procedures: see electronic medical records for all procedures/Xrays and details which were not copied into this note but were reviewed prior to creation of Plan. LABS: 
I reviewed today's most current labs and imaging studies. Pertinent labs include: 
Recent Labs 01/08/19 
0404 01/07/19 
1138 WBC 10.5 13.2* HGB 12.2 14.3 HCT 40.6 48.2*  
 264 Recent Labs 01/09/19 
0319 01/08/19 
0404 01/07/19 
1138  137 139  
K 3.9 4.0 4.2 CL 97 97 97 CO2 32 32 33* * 226* 126* BUN 25* 27* 20  
CREA 1.15* 1.67* 1.21* CA 8.5 8.7 9.6 MG 1.8  --   --   
ALB  --   --  3.6 TBILI  --   --  0.7 SGOT  --   --  20 ALT  --   --  17

## 2019-01-10 LAB
ANION GAP SERPL CALC-SCNC: 7 MMOL/L (ref 5–15)
BUN SERPL-MCNC: 17 MG/DL (ref 6–20)
BUN/CREAT SERPL: 15 (ref 12–20)
CALCIUM SERPL-MCNC: 8.4 MG/DL (ref 8.5–10.1)
CHLORIDE SERPL-SCNC: 95 MMOL/L (ref 97–108)
CO2 SERPL-SCNC: 31 MMOL/L (ref 21–32)
CREAT SERPL-MCNC: 1.11 MG/DL (ref 0.55–1.02)
GLUCOSE BLD STRIP.AUTO-MCNC: 234 MG/DL (ref 65–100)
GLUCOSE BLD STRIP.AUTO-MCNC: 259 MG/DL (ref 65–100)
GLUCOSE BLD STRIP.AUTO-MCNC: 268 MG/DL (ref 65–100)
GLUCOSE BLD STRIP.AUTO-MCNC: 403 MG/DL (ref 65–100)
GLUCOSE SERPL-MCNC: 247 MG/DL (ref 65–100)
MAGNESIUM SERPL-MCNC: 1.9 MG/DL (ref 1.6–2.4)
POTASSIUM SERPL-SCNC: 4.1 MMOL/L (ref 3.5–5.1)
SERVICE CMNT-IMP: ABNORMAL
SODIUM SERPL-SCNC: 133 MMOL/L (ref 136–145)
URATE SERPL-MCNC: 6.1 MG/DL (ref 2.6–6)

## 2019-01-10 PROCEDURE — 97110 THERAPEUTIC EXERCISES: CPT

## 2019-01-10 PROCEDURE — 80048 BASIC METABOLIC PNL TOTAL CA: CPT

## 2019-01-10 PROCEDURE — 82962 GLUCOSE BLOOD TEST: CPT

## 2019-01-10 PROCEDURE — 83735 ASSAY OF MAGNESIUM: CPT

## 2019-01-10 PROCEDURE — 93306 TTE W/DOPPLER COMPLETE: CPT

## 2019-01-10 PROCEDURE — 36415 COLL VENOUS BLD VENIPUNCTURE: CPT

## 2019-01-10 PROCEDURE — 97530 THERAPEUTIC ACTIVITIES: CPT | Performed by: OCCUPATIONAL THERAPIST

## 2019-01-10 PROCEDURE — 74011250636 HC RX REV CODE- 250/636: Performed by: INTERNAL MEDICINE

## 2019-01-10 PROCEDURE — 74011250637 HC RX REV CODE- 250/637: Performed by: INTERNAL MEDICINE

## 2019-01-10 PROCEDURE — 74011636637 HC RX REV CODE- 636/637: Performed by: INTERNAL MEDICINE

## 2019-01-10 PROCEDURE — 65660000000 HC RM CCU STEPDOWN

## 2019-01-10 PROCEDURE — 84550 ASSAY OF BLOOD/URIC ACID: CPT

## 2019-01-10 PROCEDURE — 97530 THERAPEUTIC ACTIVITIES: CPT

## 2019-01-10 RX ORDER — DICYCLOMINE HYDROCHLORIDE 10 MG/1
10 CAPSULE ORAL
Status: DISCONTINUED | OUTPATIENT
Start: 2019-01-10 | End: 2019-01-11 | Stop reason: HOSPADM

## 2019-01-10 RX ORDER — SORBITOL SOLUTION 70 %
30 SOLUTION, ORAL MISCELLANEOUS DAILY PRN
Status: DISCONTINUED | OUTPATIENT
Start: 2019-01-10 | End: 2019-01-11 | Stop reason: HOSPADM

## 2019-01-10 RX ORDER — TORSEMIDE 20 MG/1
20 TABLET ORAL EVERY OTHER DAY
Status: DISCONTINUED | OUTPATIENT
Start: 2019-01-10 | End: 2019-01-10 | Stop reason: SDUPTHER

## 2019-01-10 RX ORDER — INSULIN LISPRO 100 [IU]/ML
3 INJECTION, SOLUTION INTRAVENOUS; SUBCUTANEOUS
Status: DISCONTINUED | OUTPATIENT
Start: 2019-01-10 | End: 2019-01-11 | Stop reason: HOSPADM

## 2019-01-10 RX ADMIN — ENOXAPARIN SODIUM 40 MG: 40 INJECTION SUBCUTANEOUS at 05:13

## 2019-01-10 RX ADMIN — INSULIN LISPRO 3 UNITS: 100 INJECTION, SOLUTION INTRAVENOUS; SUBCUTANEOUS at 17:16

## 2019-01-10 RX ADMIN — Medication 10 ML: at 22:01

## 2019-01-10 RX ADMIN — GUAIFENESIN 100 MG: 200 SOLUTION ORAL at 01:39

## 2019-01-10 RX ADMIN — TORSEMIDE 20 MG: 20 TABLET ORAL at 11:07

## 2019-01-10 RX ADMIN — INSULIN GLARGINE 12 UNITS: 100 INJECTION, SOLUTION SUBCUTANEOUS at 18:37

## 2019-01-10 RX ADMIN — ENOXAPARIN SODIUM 40 MG: 40 INJECTION SUBCUTANEOUS at 17:16

## 2019-01-10 RX ADMIN — ASPIRIN 81 MG 81 MG: 81 TABLET ORAL at 11:07

## 2019-01-10 RX ADMIN — ACETAMINOPHEN 650 MG: 325 TABLET ORAL at 17:32

## 2019-01-10 RX ADMIN — ACETAMINOPHEN 650 MG: 325 TABLET ORAL at 11:07

## 2019-01-10 RX ADMIN — GUAIFENESIN 100 MG: 200 SOLUTION ORAL at 22:00

## 2019-01-10 RX ADMIN — GABAPENTIN 600 MG: 300 CAPSULE ORAL at 22:00

## 2019-01-10 RX ADMIN — Medication 10 ML: at 12:21

## 2019-01-10 RX ADMIN — INSULIN LISPRO 8 UNITS: 100 INJECTION, SOLUTION INTRAVENOUS; SUBCUTANEOUS at 12:18

## 2019-01-10 RX ADMIN — LEVOTHYROXINE SODIUM 150 MCG: 75 TABLET ORAL at 11:07

## 2019-01-10 RX ADMIN — INSULIN LISPRO 2 UNITS: 100 INJECTION, SOLUTION INTRAVENOUS; SUBCUTANEOUS at 22:00

## 2019-01-10 RX ADMIN — INSULIN LISPRO 2 UNITS: 100 INJECTION, SOLUTION INTRAVENOUS; SUBCUTANEOUS at 08:29

## 2019-01-10 RX ADMIN — Medication 10 ML: at 03:18

## 2019-01-10 NOTE — PROGRESS NOTES
Pt complaint of stomach pain, she cant no describe what kind of pain, DR Mikki Callahan aware he stated he will order bentyl and also sorbitol for constipation; went back to talk to pt stated her stomach is ok

## 2019-01-10 NOTE — PROGRESS NOTES
Hospitalist Progress NoteNAME: Mando Enriquez :  1960 MRN:  [id number] Interim Hospital Summary: 59 y.o. female whom presented on 2019 with Assessment / Plan: 
Acute right foot pain, suspect gout; improving 
-improving today after receiving colchicine Hypoxemia, currently 2-4LNC Aspiration pneumonitis Hx COPD, not in exacerbation 
-patient vomited several times at the other hospital just prior to being transferred. Sister calls one event when patient vomited while wearing a face mask. -CT Chest showed: \"IMPRESSION: 1. No visualized pulmonary embolus. 2. Enlarged pulmonary artery suggesting pulmonary hypertension. 3. Diffusely increased mineralization of the skeleton suggesting renal osteodystrophy. 4. Bibasilar atelectasis and/or consolidation. \" 
-wean O2 as tolerated Right occipital CVA Transferred from [facility] for  acute Rt occipital CVA Presents with nausea and dizziness 
- CT head Questionable, small, right occipital infarction - MRI brain Small acute right occipital lobe infarct.   
- carotid U: \"1. No evidence of significant arterial occlusive disease in the 
internal carotid arteries. 2. No significant stenosis in the external carotid arteries Bilaterally. 3. Antegrade flow in both vertebral arteries. 4. Normal flow in both subclavian arteries. \" 
- TTE:\"SUMMARY: Procedure information: Image quality was suboptimal. Left ventricle: Systolic function was normal. Ejection fraction was estimated in the range of 55 % to 60 %. Suboptimal endocardial visualization limits wall motion analysis. Mitral valve: There was mild regurgitation. \" 
- HgBA1c in 2018 was 7.0 yhus not rechecked - LDL 48; given this patient will not be started on a statin at discharge 
- Continue ASA 81 mg daily 
- ST/OT/PT eval and treat 
- Neurology consult 
  
Troponin elevation CAD, hx NSTEMI 2018 -Dr. Johanna Avila has already evaluated patient, see his note for details; Tn peak at 0.36 Headache, tension type 
-fioricet prn ANN, resolved 
-Renal US 11/2018 was unremarkable 
-related to volume depletion in the setting of vomiting and demadex use 
-can restart demadex tomorrow or at discharge DM2 
-SSI prn 
-continue Lantus 12 units qhs 
-Hummalog 5 units TIDAC PTA, will start back at 3 units [facility] Ex smoker 
  
Chronic lymphedema / venous stasis Wound care consult 
-Restarting demadex 
  
Hypothyroid 
-Continue synthroid 
  
Code Status: Full Surrogate Decision Maker: sister [phone] 
  
DVT Prophylaxis: lovenox GI Prophylaxis: not indicated Subjective: Chief Complaint / Reason for Physician Visit: follow-up stroke Foot pain better, walking better with therapy Denies other complaints BP high this am as well as glucose Review of Systems: 
Symptom Y/N Comments  Symptom Y/N Comments Fever/Chills    Chest Pain Poor Appetite    Edema  Legs wrapped Cough y   Abdominal Pain Sputum    Joint Pain SOB/RAMIREZ y   Pruritis/Rash Nausea/vomit n  resolved  Tolerating PT/OT Diarrhea    Tolerating Diet y Constipation    Other Could NOT obtain due to:   
 
PO intake:  
Patient Vitals for the past 72 hrs: 
 % Diet Eaten 01/09/19 1630 75 % 01/09/19 1220 75 % 01/09/19 0830 100 % 01/08/19 1800 100 % Objective: VITALS:  
Last 24hrs VS reviewed since prior progress note. Most recent are: 
Patient Vitals for the past 24 hrs: 
 Temp Pulse Resp BP SpO2  
01/10/19 1124 98 °F (36.7 °C) 63 20 148/69 94 % 01/10/19 1009  83  147/76   
01/10/19 0749 98.3 °F (36.8 °C) 68 20 (!) 181/94 97 % 01/10/19 0316 98.8 °F (37.1 °C) 73 18 155/83 96 % 01/09/19 2338 98.9 °F (37.2 °C) 69 18 147/81 98 % 01/09/19 2113     96 % 01/09/19 1939 97.9 °F (36.6 °C) 72 18 151/73 96 % 01/09/19 1542 98.2 °F (36.8 °C) 74 18 141/53 94 % Intake/Output Summary (Last 24 hours) at 1/10/2019 1242 Last data filed at 1/10/2019 9235 Gross per 24 hour Intake 360 ml Output 400 ml Net -40 ml PHYSICAL EXAM: 
General: WD, Chronically ill appearing, Alert, cooperative, no acute distress   
EENT:  EOMI. Anicteric sclerae. MMM Resp:  Bilateral coarse BS, no wheezing. No accessory muscle use CV:  Regular  rhythm,  No edema GI:  Soft, Non distended, Non tender.  +Bowel sounds Neurologic:  Alert and oriented X 3, normal speech,  Grossly no focal 
Psych:   Fair insight. Not anxious nor agitated Skin:  No rashes. No jaundice Reviewed most current lab test results and cultures  YES Reviewed most current radiology test results   YES Review and summation of old records today    NO Reviewed patient's current orders and MAR    YES 
PMH/ reviewed - no change compared to H&P 
________________________________________________________________________ Care Plan discussed with: 
  Comments Patient x Family RN x Care Manager Consultant Multidiciplinary team rounds were held today with , nursing, pharmacist and clinical coordinator. Patient's plan of care was discussed; medications were reviewed and discharge planning was addressed. ________________________________________________________________________ Total NON critical care TIME:   35   Minutes Total CRITICAL CARE TIME Spent:   Minutes non procedure based Comments >50% of visit spent in counseling and coordination of care x This includes time during multidisciplinary rounds if indicated above  
________________________________________________________________________ Millie Rai MD  
 
Procedures: see electronic medical records for all procedures/Xrays and details which were not copied into this note but were reviewed prior to creation of Plan. LABS: 
I reviewed today's most current labs and imaging studies. Pertinent labs include: 
Recent Labs 01/08/19 
0404 WBC 10.5 HGB 12.2 HCT 40.6  Recent Labs  
  01/10/19 
0322 01/09/19 
0319 01/08/19 
0404 * 136 137  
K 4.1 3.9 4.0  
CL 95* 97 97 CO2 31 32 32 * 182* 226* BUN 17 25* 27* CREA 1.11* 1.15* 1.67* CA 8.4* 8.5 8.7 MG 1.9 1.8  --

## 2019-01-10 NOTE — PROGRESS NOTES
Bedside and Verbal shift change report given to Adolfo Perez RN  (oncoming nurse) by Angelia Arnett RN (offgoing nurse). Report included the following information SBAR, Kardex, Recent Results, Med Rec Status and Cardiac Rhythm SR. 
  
Zone Phone: [phone] 
  
  
Significant changes during shift:  CT of chest completed  
  
Patient Information 
  
Magda Arellano 59 y.o. 
1/7/2019  6:03 PM by Manpreet Stafford DO. Magda Arellano was admitted from Home 
  
Problem List 
  
    
Patient Active Problem List  
  Diagnosis Date Noted  Elevated troponin 01/08/2019  CVA (cerebral vascular accident) ([address] 01/07/2019  COPD exacerbation ([address] 11/14/2018  
 NSTEMI (non-ST elevated myocardial infarction) ([address] 11/14/2018  Acute respiratory failure with hypoxia ([address] 11/14/2018  DKA (diabetic ketoacidoses) ([address] 03/15/2018  ANN (acute kidney injury) ([address] 03/15/2018  Venous stasis dermatitis 04/25/2014  Venous stasis syndrome 04/25/2014  IDDM (insulin dependent diabetes mellitus) ([address]    
 Morbid obesity ([address]    
 Venous insufficiency    
  
    
Past Medical History:  
Diagnosis Date  Arthritis    
 Asthma    
 Bronchitis    
 Developmental delay    
 GERD (gastroesophageal reflux disease)    
 Hypercholesterolemia    
 Hypertension    
 Hypothyroid    
 IDDM (insulin dependent diabetes mellitus) (HCC)    
 Morbid obesity (HCC)    
 Peripheral neuropathy    
 Thyroid disease    
 Venous insufficiency    
  
  
  
Core Measures: 
  
CVA: Yes Yes CHF:No No 
PNA:No No 
 
  
Activity Status: 
  
OOB to Chair No 
Ambulated this shift No  
Bed Rest No 
  
Supplemental O2: (If Applicable) 
  
NC Yes NRB No 
Venti-mask No 
On 4 Liters/min 
  
  
LINES AND DRAINS: 
  
Central Line? No  
PICC LINE? No  
Urinary Catheter? No 
DVT prophylaxis: 
  
DVT prophylaxis Med- Yes DVT prophylaxis SCD or CARINE- No  
  
Wounds: (If Applicable) 
  
Wounds- Yes 
  
Location both legs covered with dressings 
  
 Patient Safety: 
  
Falls Score Total Score: 4 Safety Level_______ Bed Alarm On? Yes Sitter?  No 
  
Plan for upcoming shift: Rest and Safety  
  
  
Discharge Plan: referral sent to [facility] 
  
Active Consults: 
IP CONSULT TO NEUROLOGY 
IP CONSULT TO CARDIOLOGY 
IP CONSULT TO ELECTROPHYSIOLOGY

## 2019-01-10 NOTE — PROGRESS NOTES
Received call from [facility] and they are reviewing medicals at this time. Informed sister and patient.

## 2019-01-10 NOTE — PROGRESS NOTES
Problem: Falls - Risk of 
Goal: *Absence of Falls Document Cyndy Arizmendi Fall Risk and appropriate interventions in the flowsheet. Outcome: Progressing Towards Goal 
Fall Risk Interventions: 
Mobility Interventions: Bed/chair exit alarm, OT consult for ADLs, Patient to call before getting OOB, PT Consult for mobility concerns, PT Consult for assist device competence Mentation Interventions: Adequate sleep, hydration, pain control, More frequent rounding, Increase mobility Medication Interventions: Bed/chair exit alarm, Patient to call before getting OOB, Evaluate medications/consider consulting pharmacy Elimination Interventions: Call light in reach, Patient to call for help with toileting needs History of Falls Interventions: Bed/chair exit alarm, Evaluate medications/consider consulting pharmacy, Door open when patient unattended, Consult care management for discharge planning

## 2019-01-10 NOTE — PROGRESS NOTES
Problem: Self Care Deficits Care Plan (Adult) Goal: *Acute Goals and Plan of Care (Insert Text) Occupational Therapy Goals Initiated 1/8/2019 1. Patient will perform grooming with supervision/set-up within 7 day(s). 2.  Patient will perform bathing at the sink with supervision/set-up within 7 day(s). 3.  Patient will perform lower body dressing with supervision/set-up within 7 day(s). 4.  Patient will perform toilet transfers with modified independence within 7 day(s). 5.  Patient will perform all aspects of toileting with independence within 7 day(s). 6.  Patient will participate in upper extremity therapeutic exercise/activities with independence for 5 minutes within 7 day(s). 7.  Patient will utilize energy conservation techniques during functional activities with verbal cues within 7 day(s). Occupational Therapy TREATMENT Patient: Mando Enriquez (55 y.o. female) Date: 1/10/2019 Diagnosis: CVA (cerebral vascular accident) [address] <principal problem not specified> Precautions: Fall Chart, occupational therapy assessment, plan of care, and goals were reviewed. ASSESSMENT: 
Pt reported feeling better this date and her R ankle was less painful (gout). The wraps (ACE) on her BLEs were down to the distal portion - not on her feet. Discussed custom compression garments that may be available to her--she declined stating that she'd tried in the past and nothing stays up to her knees. (Pt may benefit from [facility]? To explore custom garments). Pt was able to perform bed mobility, sitting EOB and ambulating in room and then to the chair with Nelia X2 for safety. Pt reports that she's been falling asleep without warning which she relates to the effects of her stroke. No complaints of vision. Improving and progressing towards goals. Progression toward goals: 
[]       Improving appropriately and progressing toward goals 
[]       Improving slowly and progressing toward goals []       Not making progress toward goals and plan of care will be adjusted PLAN: 
Patient continues to benefit from skilled intervention to address the above impairments. Continue treatment per established plan of care. Discharge Recommendations:  Rehab Further Equipment Recommendations for Discharge:  tbd SUBJECTIVE:  
Patient stated every time I come to the hospital I get gout.  OBJECTIVE DATA SUMMARY:  
Cognitive/Behavioral Status: 
Neurologic State: Alert Orientation Level: Oriented X4 Cognition: Follows commands; Appropriate safety awareness Perception: Appears intact Perseveration: No perseveration noted Safety/Judgement: Awareness of environment;Good awareness of safety precautions Functional Mobility and Transfers for ADLs:Bed Mobility: 
Rolling: Independent Supine to Sit: Stand-by assistance Scooting: Independent Transfers: 
Sit to Stand: Minimum assistance;Assist x1 Bed to Chair: Contact guard assistance; Adaptive equipment;Assist x1(RW) 
 
Balance: 
Sitting: Intact Standing: Impaired Standing - Static: Good;Constant support Standing - Dynamic : Poor ADL Intervention: 
 Pt declined seated grooming and ambulating to the bathroom this date. Cognitive Retraining Safety/Judgement: Awareness of environment;Good awareness of safety precautions Therapeutic Exercises:  
Encouraged seated and bed level exercises and to call for nursing's assistance with mobilizing. Pt verbalized understandingPain: 
Pain Scale 1: Numeric (0 - 10) Pain Intensity 1: did not rate foot/ankle gout pain After treatment:  
[x] Patient left in no apparent distress sitting up in chair 
[] Patient left in no apparent distress in bed 
[x] Call bell left within reach [x] Nursing notified 
[x] Caregiver present 
[] Bed alarm activated COMMUNICATION/COLLABORATION:  
The patients plan of care was discussed with: Physical Therapist and Registered Nurse Hi Jiménez, OTR/L Time Calculation: 31 mins

## 2019-01-10 NOTE — PROGRESS NOTES
Problem: Mobility Impaired (Adult and Pediatric) Goal: *Acute Goals and Plan of Care (Insert Text) Physical Therapy Goals Initiated 1/8/2019 1. Patient will move from supine to sit and sit to supine , scoot up and down and roll side to side in bed with independence within 7 day(s). 2.  Patient will transfer from bed to chair and chair to bed with independence using the least restrictive device within 7 day(s). 3.  Patient will perform sit to stand with independence within 7 day(s). 4.  Patient will ambulate with modified independence for 150 feet with the least restrictive device within 7 day(s). physical Therapy TREATMENT Patient: Lindy Hampton (8 y.o. female) Date: 1/10/2019 Diagnosis: [facility]cerebral vascular accident[facility] <principal problem not specified> Precautions: Fall Chart, physical therapy assessment, plan of care and goals were reviewed. ASSESSMENT: 
Patient lying in bed when PT arrived. Agreed to therapy and cleared by nursing. She reports doing exercises in bed as instructed, but not sure if R ankle will hurt still when she puts weight on it. Tolerated bed exercises well with decreased edema and increased ROM noted in bilateral ankles. Supine to sit was only sba. Sit to standing needed min a x 1 with minimal pain reported in R ankle/foot - she was able to stand up fully with RW for support. Progressed to ambulation with RW and cg assistance from the bed to chair in room - distance limited by patient fatigue. She was left in bedside chair with all needs met when the session ended. Progression toward goals: 
[x]       Improving appropriately and progressing toward goals 
[]       Improving slowly and progressing toward goals 
[]       Not making progress toward goals and plan of care will be adjusted PLAN: 
Patient continues to benefit from skilled intervention to address the above impairments. Continue treatment per established plan of care. Discharge Recommendations:  Tesfaye Velasquez Further Equipment Recommendations for Discharge:  TBD SUBJECTIVE:  
Patient stated I've been moving my ankle and legs while in the bed like you said.  OBJECTIVE DATA SUMMARY:  
Critical Behavior: 
Neurologic State: Alert Orientation Level: Oriented X4 Cognition: Follows commands, Appropriate safety awareness Safety/Judgement: Awareness of environment, Good awareness of safety precautions Functional Mobility Training: 
Bed Mobility: 
Rolling: Independent Supine to Sit: Stand-by assistance Scooting: Independent Transfers: 
Sit to Stand: Minimum assistance;Assist x1 Stand to Sit: Contact guard assistance;Assist x1 Bed to Chair: Contact guard assistance; Adaptive equipment;Assist x1(RW) 
     
  
  
  
  
Balance: 
Sitting: Intact Standing: Impaired Standing - Static: Good;Constant support Standing - Dynamic : PoorAmbulation/Gait Training: 
Distance (ft): 10 Feet (ft) Assistive Device: Walker, rolling;Gait belt Ambulation - Level of Assistance: Contact guard assistance Gait Description (WDL): Exceptions to [facility] Gait Abnormalities: Antalgic;Decreased step clearance; Step to gait;Trunk sway increased;Circumduction Base of Support: Widened Stance: Right decreased Speed/Diana: Slow;Shuffled Step Length: Right shortened;Left shortened Therapeutic Exercises:  
Supine: ankle pumps, heel slides, bridges, hip abd/add. 10 reps each side. Pain: 
Pain Scale 1: Numeric (0 - 10) Pain Intensity 1: 0 Activity Tolerance:  
Limited Please refer to the flowsheet for vital signs taken during this treatment. After treatment:  
[x] Patient left in no apparent distress sitting up in chair 
[] Patient left in no apparent distress in bed 
[x] Call bell left within reach [x] Nursing notified 
[] Caregiver present 
[] Bed alarm activated COMMUNICATION/EDUCATION:  
 The patients plan of care was discussed with: Occupational Therapist, Registered Nurse and . [x]  Patient/family have participated as able in goal setting and plan of care. [x]  Patient/family agree to work toward stated goals and plan of care. [x]  Patient understands intent and goals of therapy, but is neutral about his/her participation. []  Patient is unable to participate in goal setting and plan of care. Thank you for this referral. 
Leyda Florence, PT Time Calculation: 26 mins

## 2019-01-10 NOTE — DIABETES MGMT
DTC Progress Note Recommendations/ Comments:Pt's BG trending upward as high as 403 mg/dL today. If appropriate, please consider:  
1) changing correctional insulin to normal sensitivity 2) starting Lispro 3 units prandial insulin dose ac tid (60% of home dose). 3. Increasing Lantus to 16 units. Current hospital DM medication: Lantus 12 units daily, Lispro high sensitivity correctional insulin ac and hs. Chart reviewed on Jayne Lizama due to BG >180 mg/dl. Patient is a 59 y.o. female with known DM on Lantus 12 units daily and Novolog 5 units ac plus 5 units:50 mg/dl > 150 mg/dl tid at home. A1c:  
Lab Results Component Value Date/Time Hemoglobin A1c 7.0 (H) 11/15/2018 04:51 AM  
 Hemoglobin A1c 8.0 (H) 03/16/2018 01:36 AM  
 
 
Recent Glucose Results:  
Lab Results Component Value Date/Time  (H) 01/10/2019 03:22 AM  
 GLUCPOC 403 (H) 01/10/2019 11:20 AM  
 GLUCPOC 234 (H) 01/10/2019 06:27 AM  
 GLUCPOC 337 (H) 01/09/2019 09:04 PM  
  
 
Lab Results Component Value Date/Time Creatinine 1.11 (H) 01/10/2019 03:22 AM  
 
CrCl cannot be calculated (Unknown ideal weight. ). Active Orders Diet DIET DIABETIC CONSISTENT CARB Regular PO intake:  
Patient Vitals for the past 72 hrs: 
 % Diet Eaten 01/09/19 1630 75 % 01/09/19 1220 75 % 01/09/19 0830 100 % 01/08/19 1800 100 % Will continue to follow as needed. Thank you Elder Tobias [facility] Time spent: 10 minutes

## 2019-01-10 NOTE — PROGRESS NOTES
Per esther the [facility], operated by [facility] has accepted patient and has begun insurance authorization. He will let me know when this is approved. Germán Bowles , patient's sister, and informed her of above and she is in agreement. Informed patient of above and she is in agreement also.

## 2019-01-10 NOTE — WOUND CARE
Wound care consulted for blisters on the sacrum and gluteal cleft of this patient. Pt. Stated that she always has \"something\" on her buttocks. Assessment: the gluteal cleft has a fissure that is fairly fresh. Pt. Denies pain unless this is touched. The wound is blanchable and the little purple skin lesions are chronic and they surround her anal area. The is most likely caused by chronic constipation / pushing with defecating. The diaper was removed. Pt. Has a PureWick in place and a bed chux under her. Treatment: The skin on the gluteal cleft and the graham-anal skin was treated with application of white zinc oxide cream (Sensicare). Plan: Apply the Sensicare cream at least twice per day and as needed to clean and dry skin.   
Claire Opitz, RN, BSN, [employer]

## 2019-01-10 NOTE — PROGRESS NOTES
* No surgery found * 
* No surgery found * Bedside and Verbal shift change report given to REY De La Cruz  (oncoming nurse) by Luz Maria Oneil RN (offgoing nurse). Report included the following information SBAR, Kardex, Recent Results, Med Rec Status and Cardiac Rhythm SR. 
  
Zone Phone:  
  
  
Significant changes during shift:  D-Dimer drawn, MD ordered CT Chest. Will be done this evening     
  
  
Patient Information 
  
Doni Restrepo 59 y.o. 
1/7/2019  6:03 PM by Gino Espinoza DO. Doni Restrepo was admitted from Home 
  
Problem List 
  
    
Patient Active Problem List  
  Diagnosis Date Noted  Elevated troponin 01/08/2019  CVA (cerebral vascular accident) ([address] 01/07/2019  COPD exacerbation ([address] 11/14/2018  
 NSTEMI (non-ST elevated myocardial infarction) ([address] 11/14/2018  Acute respiratory failure with hypoxia ([address] 11/14/2018  DKA (diabetic ketoacidoses) ([address] 03/15/2018  ANN (acute kidney injury) ([address] 03/15/2018  Venous stasis dermatitis 04/25/2014  Venous stasis syndrome 04/25/2014  IDDM (insulin dependent diabetes mellitus) ([address]    
 Morbid obesity ([address]    
 Venous insufficiency    
  
    
Past Medical History:  
Diagnosis Date  Arthritis    
 Asthma    
 Bronchitis    
 Developmental delay    
 GERD (gastroesophageal reflux disease)    
 Hypercholesterolemia    
 Hypertension    
 Hypothyroid    
 IDDM (insulin dependent diabetes mellitus) (HCC)    
 Morbid obesity (HCC)    
 Peripheral neuropathy    
 Thyroid disease    
 Venous insufficiency    
  
  
  
Core Measures: 
  
CVA: Yes Yes CHF:No No 
PNA:No No 
  
Post Op Surgical (If Applicable):  
  
Number times ambulated in hallway past shift:  0 Number of times OOB to chair past shift:   0 
NG Tube: No 
Incentive Spirometer: No 
Drains: No   Volume  0 Dressing Present:  No 
Flatus:  Not applicable 
  
Activity Status: 
  
OOB to Chair No 
Ambulated this shift No  
Bed Rest No 
  
 Supplemental O2: (If Applicable) 
  
NC Yes NRB No 
Venti-mask No 
On 4 Liters/min 
  
  
LINES AND DRAINS: 
  
Central Line? No  
PICC LINE? No  
Urinary Catheter? No 
DVT prophylaxis: 
  
DVT prophylaxis Med- Yes DVT prophylaxis SCD or CARINE- No  
  
Wounds: (If Applicable) 
  
Wounds- Yes 
  
Location both legs covered with dressings 
  
Patient Safety: 
  
Falls Score Total Score: 4 Safety Level_______ Bed Alarm On? Yes Sitter?  No 
  
Plan for upcoming shift: CT chest   
  
  
Discharge Plan: referral sent to [facility] [address] Dr 
Active Consults: 
IP CONSULT TO NEUROLOGY 
IP CONSULT TO CARDIOLOGY 
IP CONSULT TO ELECTROPHYSIOLOGY

## 2019-01-10 NOTE — INTERDISCIPLINARY ROUNDS
[facility] Interdisciplinary rounds were held today to discuss patient plan of care and outcomes. The interdisciplinary team collaborated to facilitate discharge planning needs. The following members were present, Clinical Care Lead, Pharmacist, Primary Nurse, , Physical Therapy, Physician, and Case Management. PLAN: 
Informed CM that family has two other places that they want referrals sent to and that they will go with whichever place accepts first - they are anxious to have patient back in her community.

## 2019-01-10 NOTE — PROGRESS NOTES
Calling CM to notify that after speaking with family ( they called into room and when pt stated that I was at the bedside, asked to speak to me to find out DC update)  they stated they gave CM 3 places that they would be happy with. They are anxious to have pt moved when ready. She stated ok to send referrals to all 3 and they would be happy with whichever place responds back first. CM stated that she had it handled. [id number] - called Berenice and left  that DR. Suellen Bonilla stated to plan discharge for tomorrow.

## 2019-01-10 NOTE — PROGRESS NOTES
Bedside and Verbal shift change [facility]  (oncoming nurse) by Mayco Crump RN (offgoing nurse). Report included the following information SBAR, Kardex, Recent Results, Med Rec Status and Cardiac Rhythm SR. 
  
Zone Phone: [phone] 
  
  
Significant changes during shift:  Up in chair seen by wound care  
Patient Information 
  
Janina Dorsey 59 y.o. 
1/7/2019  6:03 PM by Lizette Duenas DO. Janina Dorsey was admitted from Home 
  
Problem List 
  
    
Patient Active Problem List  
  Diagnosis Date Noted  Elevated troponin 01/08/2019  [facility] 01/07/2019  COPD exacerbation ([facility] 11/14/2018  
 NSTEMI (non-ST elevated myocardial infarction) ([facility] 11/14/2018  Acute respiratory failure with hypoxia ([facility] 11/14/2018  DKA (diabetic ketoacidoses) ([facility] 03/15/2018  ANN (acute kidney injury) ([facility] 03/15/2018  Venous stasis dermatitis 04/25/2014  Venous stasis syndrome 04/25/2014  IDDM (insulin dependent diabetes mellitus) ([facility]    
 Morbid obesity ([facility]    
 Venous insufficiency    
  
    
Past Medical History:  
Diagnosis Date  Arthritis    
 Asthma    
 Bronchitis    
 Developmental delay    
 GERD (gastroesophageal reflux disease)    
 Hypercholesterolemia    
 Hypertension    
 Hypothyroid    
 IDDM (insulin dependent diabetes mellitus) (HCC)    
 Morbid obesity (HCC)    
 Peripheral neuropathy    
 Thyroid disease    
 Venous insufficiency    
  
  
  
Core Measures: 
  
CVA: Yes Yes CHF:No No 
PNA:No No 
 
  
Activity Status: 
  
OOB to Chair yes Ambulated this shift yes Bed Rest No 
  
Supplemental O2: (If Applicable) 
  
NC Yes NRB No 
Venti-mask No 
On 3 Liters/min 
  
  
LINES AND DRAINS: 
  
Central Line? No  
PICC LINE? No  
Urinary Catheter? No 
DVT prophylaxis: 
  
DVT prophylaxis Med- Yes DVT prophylaxis SCD or CARINE- No  
  
Wounds: (If Applicable) 
  
Wounds- Yes 
  
 Location both legs covered with dressings blister on buttocks 
  
Patient Safety: 
  
Falls Score Total Score: 4 Safety Level_______ Bed Alarm On? Yes Sitter?  No 
  
Plan for upcoming shift: Rest and Safety  
  
  
Discharge Plan: referral sent to [facility] 
  
Active Consults: 
IP CONSULT TO NEUROLOGY 
IP CONSULT TO CARDIOLOGY 
IP CONSULT TO ELECTROPHYSIOLOGY

## 2019-01-11 VITALS
OXYGEN SATURATION: 94 % | SYSTOLIC BLOOD PRESSURE: 146 MMHG | TEMPERATURE: 97.9 F | RESPIRATION RATE: 18 BRPM | DIASTOLIC BLOOD PRESSURE: 76 MMHG | HEART RATE: 59 BPM

## 2019-01-11 LAB
ALBUMIN SERPL-MCNC: 2.6 G/DL (ref 3.5–5)
ALBUMIN/GLOB SERPL: 0.7 {RATIO} (ref 1.1–2.2)
ALP SERPL-CCNC: 107 U/L (ref 45–117)
ALT SERPL-CCNC: 11 U/L (ref 12–78)
ANION GAP SERPL CALC-SCNC: 4 MMOL/L (ref 5–15)
AST SERPL-CCNC: 14 U/L (ref 15–37)
BASOPHILS # BLD: 0 K/UL (ref 0–0.1)
BASOPHILS NFR BLD: 1 % (ref 0–1)
BILIRUB SERPL-MCNC: 0.7 MG/DL (ref 0.2–1)
BUN SERPL-MCNC: 16 MG/DL (ref 6–20)
BUN/CREAT SERPL: 12 (ref 12–20)
CALCIUM SERPL-MCNC: 8.2 MG/DL (ref 8.5–10.1)
CHLORIDE SERPL-SCNC: 96 MMOL/L (ref 97–108)
CO2 SERPL-SCNC: 33 MMOL/L (ref 21–32)
CREAT SERPL-MCNC: 1.33 MG/DL (ref 0.55–1.02)
DIFFERENTIAL METHOD BLD: ABNORMAL
EOSINOPHIL # BLD: 0.6 K/UL (ref 0–0.4)
EOSINOPHIL NFR BLD: 9 % (ref 0–7)
ERYTHROCYTE [DISTWIDTH] IN BLOOD BY AUTOMATED COUNT: 15.5 % (ref 11.5–14.5)
GLOBULIN SER CALC-MCNC: 3.6 G/DL (ref 2–4)
GLUCOSE BLD STRIP.AUTO-MCNC: 209 MG/DL (ref 65–100)
GLUCOSE BLD STRIP.AUTO-MCNC: 316 MG/DL (ref 65–100)
GLUCOSE SERPL-MCNC: 175 MG/DL (ref 65–100)
HCT VFR BLD AUTO: 40.4 % (ref 35–47)
HGB BLD-MCNC: 12 G/DL (ref 11.5–16)
IMM GRANULOCYTES # BLD AUTO: 0.1 K/UL (ref 0–0.04)
IMM GRANULOCYTES NFR BLD AUTO: 1 % (ref 0–0.5)
LYMPHOCYTES # BLD: 1.3 K/UL (ref 0.8–3.5)
LYMPHOCYTES NFR BLD: 19 % (ref 12–49)
MCH RBC QN AUTO: 23.7 PG (ref 26–34)
MCHC RBC AUTO-ENTMCNC: 29.7 G/DL (ref 30–36.5)
MCV RBC AUTO: 79.8 FL (ref 80–99)
MONOCYTES # BLD: 0.8 K/UL (ref 0–1)
MONOCYTES NFR BLD: 12 % (ref 5–13)
NEUTS SEG # BLD: 3.8 K/UL (ref 1.8–8)
NEUTS SEG NFR BLD: 58 % (ref 32–75)
NRBC # BLD: 0 K/UL (ref 0–0.01)
NRBC BLD-RTO: 0 PER 100 WBC
PLATELET # BLD AUTO: 223 K/UL (ref 150–400)
PMV BLD AUTO: 10.2 FL (ref 8.9–12.9)
POTASSIUM SERPL-SCNC: 3.9 MMOL/L (ref 3.5–5.1)
PROT SERPL-MCNC: 6.2 G/DL (ref 6.4–8.2)
RBC # BLD AUTO: 5.06 M/UL (ref 3.8–5.2)
SERVICE CMNT-IMP: ABNORMAL
SERVICE CMNT-IMP: ABNORMAL
SODIUM SERPL-SCNC: 133 MMOL/L (ref 136–145)
WBC # BLD AUTO: 6.6 K/UL (ref 3.6–11)

## 2019-01-11 PROCEDURE — 80053 COMPREHEN METABOLIC PANEL: CPT

## 2019-01-11 PROCEDURE — 74011250636 HC RX REV CODE- 250/636: Performed by: INTERNAL MEDICINE

## 2019-01-11 PROCEDURE — 77030038269 HC DRN EXT URIN PURWCK BARD -A

## 2019-01-11 PROCEDURE — 94760 N-INVAS EAR/PLS OXIMETRY 1: CPT

## 2019-01-11 PROCEDURE — 74011636637 HC RX REV CODE- 636/637: Performed by: INTERNAL MEDICINE

## 2019-01-11 PROCEDURE — 97116 GAIT TRAINING THERAPY: CPT

## 2019-01-11 PROCEDURE — 36415 COLL VENOUS BLD VENIPUNCTURE: CPT

## 2019-01-11 PROCEDURE — 82962 GLUCOSE BLOOD TEST: CPT

## 2019-01-11 PROCEDURE — 74011250637 HC RX REV CODE- 250/637: Performed by: INTERNAL MEDICINE

## 2019-01-11 PROCEDURE — 77010033678 HC OXYGEN DAILY

## 2019-01-11 PROCEDURE — 85025 COMPLETE CBC W/AUTO DIFF WBC: CPT

## 2019-01-11 PROCEDURE — 97110 THERAPEUTIC EXERCISES: CPT

## 2019-01-11 RX ORDER — ACETAMINOPHEN 325 MG/1
650 TABLET ORAL
Qty: 90 TAB | Refills: 0 | Status: SHIPPED
Start: 2019-01-11 | End: 2021-09-20

## 2019-01-11 RX ORDER — METOPROLOL TARTRATE 25 MG/1
12.5 TABLET, FILM COATED ORAL 2 TIMES DAILY
Status: DISCONTINUED | OUTPATIENT
Start: 2019-01-11 | End: 2019-01-11

## 2019-01-11 RX ADMIN — LEVOTHYROXINE SODIUM 150 MCG: 75 TABLET ORAL at 06:28

## 2019-01-11 RX ADMIN — GUAIFENESIN 100 MG: 200 SOLUTION ORAL at 07:53

## 2019-01-11 RX ADMIN — BUTALBITAL, ACETAMINOPHEN AND CAFFEINE 1 TABLET: 50; 325; 40 TABLET ORAL at 13:27

## 2019-01-11 RX ADMIN — INSULIN LISPRO 4 UNITS: 100 INJECTION, SOLUTION INTRAVENOUS; SUBCUTANEOUS at 12:51

## 2019-01-11 RX ADMIN — Medication 10 ML: at 03:17

## 2019-01-11 RX ADMIN — ASPIRIN 81 MG 81 MG: 81 TABLET ORAL at 08:46

## 2019-01-11 RX ADMIN — INSULIN LISPRO 3 UNITS: 100 INJECTION, SOLUTION INTRAVENOUS; SUBCUTANEOUS at 08:47

## 2019-01-11 RX ADMIN — INSULIN LISPRO 2 UNITS: 100 INJECTION, SOLUTION INTRAVENOUS; SUBCUTANEOUS at 08:46

## 2019-01-11 RX ADMIN — INSULIN LISPRO 3 UNITS: 100 INJECTION, SOLUTION INTRAVENOUS; SUBCUTANEOUS at 12:50

## 2019-01-11 RX ADMIN — ENOXAPARIN SODIUM 40 MG: 40 INJECTION SUBCUTANEOUS at 04:30

## 2019-01-11 NOTE — ROUTINE PROCESS
Called report to the [facility]). Used SBAR, Kardex, MAR and flow sheet. Answered all questions and gave my name and number incase she had more questions later.

## 2019-01-11 NOTE — PROGRESS NOTES
1944: Bedside and Verbal shift change report given to Kassi Reardon Chen (oncoming nurse) by Temo Corbin (offgoing nurse). Report included the following information SBAR. Patient participated in report. Bed alarm on.

## 2019-01-11 NOTE — DISCHARGE SUMMARY
Hospitalist Discharge Summary     Patient ID:  Magda Arellano  [id number]  63 y.o.  1960    PCP on record: Ghulam Camp MD    Admit date: 1/7/2019  Discharge date and time: 1/11/2019      DISCHARGE DIAGNOSIS:  Right occipital CVA  Acute Respiratory Failure with Hypoxia from Aspiration pneumonitis  Hx COPD, not in exacerbation  Acute right foot pain, suspect gout; improving  Troponin elevation  CAD, hx NSTEMI 11/2018  ANN, resolved; CKD stage III  DM2  Chronic lymphedema / venous stasis  Hypothyroidism      CONSULTATIONS:  IP CONSULT TO NEUROLOGY  IP CONSULT TO CARDIOLOGY    See HPI from H&P of Manpreet Stafford, DO:      ______________________________________________________________________  DISCHARGE SUMMARY/HOSPITAL COURSE:  for full details see H&P, daily progress notes, labs, consult notes. Hospital course via problem below:    Hypoxemia, currently 2-4LNC  Aspiration pneumonitis  Hx COPD, not in exacerbation  -patient vomited several times at the other hospital just prior to being transferred. Sister recalled one event when patient vomited while wearing a face mask. -CT Chest showed: \"IMPRESSION: 1. No visualized pulmonary embolus. 2. Enlarged pulmonary artery suggesting pulmonary hypertension. 3. Diffusely increased mineralization of the skeleton suggesting renal osteodystrophy. 4. Bibasilar atelectasis and/or consolidation. \"  -wean O2 as tolerated     Right occipital CVA  Transferred from [facility] for  acute Rt occipital CVA  Presents with nausea and dizziness  - CT head Questionable, small, right occipital infarction  - MRI brain Small acute right occipital lobe infarct.    - carotid Ultrasound: \"1. No evidence of significant arterial occlusive disease in the internal carotid arteries. 2. No significant stenosis in the external carotid arteries Bilaterally. 3. Antegrade flow in both vertebral arteries. 4. Normal flow in both subclavian arteries. \"  - TTE:\"SUMMARY: Procedure information: Image quality was suboptimal. Left ventricle: Systolic function was normal. Ejection fraction was estimated in the range of 55 % to 60 %. Suboptimal endocardial visualization limits wall motion analysis. Mitral valve: There was mild regurgitation. \"  - HgBA1c in Nov 2018 was 7.0 yhus not rechecked  - LDL 48; given this patient will not be started on a statin at discharge  - Continue ASA 81 mg daily  - ST/OT/PT eval and treat  - Neurology consult     Troponin elevation  CAD, hx NSTEMI 11/2018  -Dr. Bossman Charles and Dr. Fede Perales have already evaluated patient, see their note for details Tn peak at 0.36  · -excerpt from Dr. Gene Gay note as follows: \"Mild elevation in troponin in setting of CVA and ANN. Had higher troponin during 11/18 admission, with a cath that showed non-obstructive CAD. · No need for any additional troponin checks, no indication for further ischemic work up. \"   -continue ASA, low dose BB not started as HR is the lower limit of normal    Acute right foot pain, suspect gout; improving  -improving today after receiving colchicine    Headache, improved  -tylenol prn on discharge     ANN, resolved on CKD stage III  -Renal US 11/2018 was unremarkable  -related to volume depletion in the setting of vomiting and demadex use  -continue torsemide q48 h on discharge, check BMP next week at SNF     DM2  -SSI prn  -no changes made to home insulin on discharge     Ex smoker     Chronic lymphedema / venous stasis  Perineal irritation  Wound care consult appreciated  \"-BLE lympadema wraps: DO NOT throw away or replace with ace wraps. Send home with patient. . Patient needs legs washed weekly and lotion applied before wrapping them around her lower legs. -Perineum: Apply the Sensicare cream at least twice per day and as needed to clean and dry skin. Frutoso Golder Frutoso Golder Frutoso Golder \"      Hypothyroidism  -Continue synthroid         _______________________________________________________________________  Patient seen and examined by me on discharge day. Pertinent Findings:  Gen:    Not in distress  Chest: No accessory muscle use  CVS:   Regular rhythm. No edema  Abd:  Soft, not distended, not tender  Neuro:  Alert  _______________________________________________________________________  DISCHARGE MEDICATIONS:   Current Discharge Medication List      START taking these medications    Details   acetaminophen (TYLENOL) 325 mg tablet Take 2 Tabs by mouth every four (4) hours as needed for Fever (For temp greater than or equal to 38.5 C or 101.3 F (Unless hepatic failure or contrindicated). Give first line for fever. ). Qty: 90 Tab, Refills: 0         CONTINUE these medications which have NOT CHANGED    Details   potassium chloride (K-DUR, KLOR-CON) 20 mEq tablet Take 20 mEq by mouth every other day. Alternates day with torsemide      torsemide (DEMADEX) 20 mg tablet Take 20 mg by mouth every other day. Alternates administration w/ potassium supplement      silver sulfADIAZINE (SILVADENE) 1 % topical cream Apply 1 Each to affected area every Monday, Wednesday, Friday. Apply to legs      zinc 50 mg tab tablet Take 50 mg by mouth daily. insulin aspart U-100 (NOVOLOG FLEXPEN U-100 INSULIN) 100 unit/mL inpn 5 Units by SubCUTAneous route Before breakfast, lunch, and dinner. + SSI TIDAC:  140-199=2 u            200-249=3 u  250-299=5 u  300-349=8 u  Qty: 3 Adjustable Dose Pre-filled Pen Syringe, Refills: 0      levothyroxine (SYNTHROID) 150 mcg tablet Take 150 mcg by mouth Daily (before breakfast). gabapentin (NEURONTIN) 300 mg capsule Take 600 mg by mouth nightly. insulin glargine (LANTUS) 100 unit/mL injection 12 Units by SubCUTAneous route daily (after dinner). Patient takes at 1830 each day      ipratropium-albuterol (COMBIVENT RESPIMAT)  mcg/actuation inhaler Take 1 Puff by inhalation every six (6) hours as needed for Shortness of Breath. aspirin delayed-release 81 mg tablet Take 81 mg by mouth daily.              My Recommended Diet, Activity, Wound Care, and follow-up labs are listed in the patient's Discharge Insturctions which I have personally completed and reviewed.     ______________________________________________________________________    Risk of deterioration: Moderate    Condition at Discharge:  Stable  ______________________________________________________________________    Disposition  SNF/LTC  ______________________________________________________________________    Care Plan discussed with:   Patient, Family (message left with sister), RN, Care Manager, Consultant    ______________________________________________________________________    Code Status: DNR/DNI  ______________________________________________________________________      Follow up with:   PCP : Cristina Perez MD  Follow-up Information     Follow up With Specialties Details Why Chris Goldmann, MD Internal Medicine Go on 2/14/2019 Please follow up on February 14, 2019 at 11:00 [address] [id number] Jenny North NP Nurse Practitioner Go on 2/7/2019 Please follow up on February 7, 2019 at 11:00 arriving at 10:30 to register with photo ID, insurance card and current list of medication  [address]  [address] [facility] [place]  [phone]                Total time in minutes spent coordinating this discharge (includes going over instructions, follow-up, prescriptions, and preparing report for sign off to her PCP) :  35 minutes    Signed:  Khai Cedeno MD

## 2019-01-11 NOTE — PROGRESS NOTES
Problem: Mobility Impaired (Adult and Pediatric) Goal: *Acute Goals and Plan of Care (Insert Text) Physical Therapy Goals Initiated 1/8/2019 1. Patient will move from supine to sit and sit to supine , scoot up and down and roll side to side in bed with independence within 7 day(s). 2.  Patient will transfer from bed to chair and chair to bed with independence using the least restrictive device within 7 day(s). 3.  Patient will perform sit to stand with independence within 7 day(s). 4.  Patient will ambulate with modified independence for 150 feet with the least restrictive device within 7 day(s). physical Therapy TREATMENT Patient: Doni Restrepo (38 y.o. female) Date: 1/11/2019 Diagnosis: [facility] vascular accident[facility] <principal problem not specified> Precautions: Fall Chart, physical therapy assessment, plan of care and goals were reviewed. ASSESSMENT: 
Patient lying in bed sleeping. Awakened and agreed to PT. Cleared by nursing for mobilization. Provided exercises in bed with good tolerance and performance. Functionally she is moving about the same with transfers and walking. Supine to sit transfers was sba and sit to stand cg assistance with RW for standing support due to decreased standing balance. Gait progressed to 15 feet with cg assistance and RW - gait pattern remains slow with decreased step lengths shuffling pattern. Distance was limited by fatigue and continued R foot pain with wb due to gout. She was left in the chair with all needs met when the session ended. Recommend SNF level rehab upon discharge to improve strength, balance and mobility skills prior to returning home. Progression toward goals: 
[x]       Improving appropriately and progressing toward goals 
[]       Improving slowly and progressing toward goals 
[]       Not making progress toward goals and plan of care will be adjusted PLAN: 
 Patient continues to benefit from skilled intervention to address the above impairments. Continue treatment per established plan of care. Discharge Recommendations:  Tesfaye Velasquez Further Equipment Recommendations for Discharge:  TBD SUBJECTIVE:  
Patient stated I hope to get out of here later today.  OBJECTIVE DATA SUMMARY:  
Critical Behavior: 
Neurologic State: Drowsy Orientation Level: Oriented X4 Cognition: Follows commands, Appropriate decision making, Appropriate for age attention/concentration, Appropriate safety awareness Safety/Judgement: Awareness of environment, Good awareness of safety precautions Functional Mobility Training: 
Bed Mobility: 
Rolling: Independent Supine to Sit: Stand-by assistance;Supervision Scooting: Independent Transfers: 
Sit to Stand: Contact guard assistance Stand to Sit: Stand-by assistance Bed to Chair: Contact guard assistance Balance: 
Sitting: Intact Standing: Impaired Standing - Static: Constant support; Fair 
Standing - Dynamic : PoorAmbulation/Gait Training: 
Distance (ft): 15 Feet (ft) Assistive Device: Walker, rolling;Gait belt Ambulation - Level of Assistance: Contact guard assistance Gait Abnormalities: Antalgic;Decreased step clearance; Steppage gait;Trunk sway increased; Step to gait(increased R ankle gout pain) Base of Support: Widened Stance: Right decreased Speed/Diana: Slow;Shuffled Therapeutic Exercises:  
Supine - ankle pumps, quad sets, glut sets, heel slides and hip abd. All exercises x 10 reps each side. Pain: 
Pain Scale 1: Numeric (0 - 10) Pain Intensity 1: 0 Activity Tolerance:  
Limited. Please refer to the flowsheet for vital signs taken during this treatment. After treatment:  
[x] Patient left in no apparent distress sitting up in chair 
[] Patient left in no apparent distress in bed 
[x] Call bell left within reach [x] Nursing notified 
[] Caregiver present 
[] Bed alarm activated COMMUNICATION/EDUCATION:  
The patients plan of care was discussed with: Occupational Therapist, Registered Nurse and . [x]  Fall prevention education was provided and the patient/caregiver indicated understanding. [x]  Patient/family have participated as able in goal setting and plan of care. [x]  Patient/family agree to work toward stated goals and plan of care. []  Patient understands intent and goals of therapy, but is neutral about his/her participation. []  Patient is unable to participate in goal setting and plan of care. Thank you for this referral. 
Robyn Cortez, PT Time Calculation: 23 mins

## 2019-01-11 NOTE — DISCHARGE INSTRUCTIONS
HOSPITALIST DISCHARGE INSTRUCTIONS    NAME: Berenice Chowdhury   :  1960   MRN:  [id number]     Date/Time:  2019 9:23 AM    ADMIT DATE: 2019   DISCHARGE DATE: 2019     Attending Physician: Graciela Bates MD    DISCHARGE DIAGNOSIS:  Right occipital CVA  Acute Respiratory Failure with Hypoxia from Aspiration pneumonitis  Hx COPD, not in exacerbation  Acute right foot pain, suspect gout; improving  Troponin elevation  CAD, hx NSTEMI 2018  ANN, resolved; CKD stage III  DM2  Chronic lymphedema / venous stasis  Hypothyroidism    Medications: Per above medication reconciliation. Pain Management: Tylenol as needed for pain    Recommended diet: Diabetic Diet    Recommended activity: PT/OT Eval and Treat    Wound care:   -BLE lympadema wraps: DO NOT throw away or replace with ace wraps. Send home with patient. . Patient needs legs washed weekly and lotion applied before wrapping them around her lower legs. -Perineum: Apply the Sensicare cream at least twice per day and as needed to clean and dry skin. Indwelling devices:  None    Supplemental Oxygen: 2 LNC,  wean as tolerated    Required Lab work: Check BMP next week to see if torsemide needs to be adjusted    Glucose management:  Accucheck ACHS with sliding scale per SNF protocol    Code status: DNR        Outside physician follow up: Follow-up Information     Follow up With Specialties Details Why Jeaneth Spencer MD Internal Medicine   [id number]  [phone]                 Skilled nursing facility/ SNF MD responsible for above on discharge. Information obtained by :  I understand that if any problems occur once I am at home I am to contact my physician. I understand and acknowledge receipt of the instructions indicated above.                                                                                                                                            Physician's or R.N.'s Signature                                                                  Date/Time                                                                                                                                              Patient or Repres

## 2019-01-11 NOTE — PROGRESS NOTES
Starr Pennington from [address] called this morning and they have obtained insurance authorization and can accept patient today if medically stable. Spoke with Dr Beulah Teresa and he will discharge patient today. Informed patient and her sister of the acceptance. Her sister is on the way to see patient. Nursing to call report to [phone]. Nursing to fax discharge summary to [phone]. [facility] form filled out and given to nursing. [facility] referral sent and they can transport patient at  1330pm today. Informed Starr Pennington with [facility] of  time.

## 2019-01-11 NOTE — PROGRESS NOTES
Dr Tabatha Magallon informed of HR in high 50's. and pt is due for metoprolol. He stated to hold metoprolol

## 2019-01-11 NOTE — PROGRESS NOTES
Bedside and Verbal shift change report given to [address]  (oncoming nurse) by Thaddeus Rebolledo RN (offgoing nurse). Report included the following information SBAR, Kardex, Recent Results, Med Rec Status and Cardiac Rhythm SR. 
  
Zone Phone: [phone] 
  
  
Significant changes during shift:  None Patient Information 
  
Fayne Harada 59 y.o. 
1/7/2019  6:03 PM by Dayan Feliz DO. Fayne Harada was admitted from Home 
  
Problem List 
  
    
Patient Active Problem List  
  Diagnosis Date Noted  Elevated troponin 01/08/2019  CVA (cerebral vascular accident) ([address] 01/07/2019  COPD exacerbation ([address] 11/14/2018  
 NSTEMI (non-ST elevated myocardial infarction) ([address] 11/14/2018  Acute respiratory failure with hypoxia ([address] 11/14/2018  DKA (diabetic ketoacidoses) ([address] 03/15/2018  ANN (acute kidney injury) ([address] 03/15/2018  Venous stasis dermatitis 04/25/2014  Venous stasis syndrome 04/25/2014  IDDM (insulin dependent diabetes mellitus) ([address]    
 Morbid obesity ([address]    
 Venous insufficiency    
  
    
Past Medical History:  
Diagnosis Date  Arthritis    
 Asthma    
 Bronchitis    
 Developmental delay    
 GERD (gastroesophageal reflux disease)    
 Hypercholesterolemia    
 Hypertension    
 Hypothyroid    
 IDDM (insulin dependent diabetes mellitus) (HCC)    
 Morbid obesity (HCC)    
 Peripheral neuropathy    
 Thyroid disease    
 Venous insufficiency    
  
  
  
Core Measures: 
  
CVA: Yes Yes CHF:No No 
PNA:No No 
 
  
Activity Status: 
  
OOB to Chair yes Ambulated this shift yes Bed Rest No 
  
Supplemental O2: (If Applicable) 
  
NC Yes NRB No 
Venti-mask No 
On 3 Liters/min 
  
  
LINES AND DRAINS: 
  
Central Line? No  
PICC LINE? No  
Urinary Catheter? No 
DVT prophylaxis: 
  
DVT prophylaxis Med- Yes DVT prophylaxis SCD or CARINE- No  
  
Wounds: (If Applicable) 
  
Wounds- Yes 
  
Location both legs covered with dressings blister on buttocks 
  
 Patient Safety: 
  
Falls Score Total Score: 4 Safety Level_______ Bed Alarm On? Yes Sitter?  No 
  
Plan for upcoming shift: safety, neurochecks 
  
  
Discharge Plan: referral sent to [facility] [address] 
Active Consults: 
IP CONSULT TO NEUROLOGY 
IP CONSULT TO CARDIOLOGY 
IP CONSULT TO ELECTROPHYSIOLOGY

## 2020-02-20 ENCOUNTER — OFFICE VISIT (OUTPATIENT)
Dept: SURGERY | Age: 60
End: 2020-02-20

## 2020-02-20 VITALS
DIASTOLIC BLOOD PRESSURE: 62 MMHG | HEART RATE: 71 BPM | HEIGHT: 64 IN | SYSTOLIC BLOOD PRESSURE: 150 MMHG | BODY MASS INDEX: 34.49 KG/M2 | TEMPERATURE: 97.6 F | OXYGEN SATURATION: 93 % | WEIGHT: 202 LBS | RESPIRATION RATE: 18 BRPM

## 2020-02-20 DIAGNOSIS — I87.2 VENOUS STASIS DERMATITIS OF BOTH LOWER EXTREMITIES: Primary | ICD-10-CM

## 2020-02-20 DIAGNOSIS — I83.022 VENOUS STASIS ULCER OF LEFT CALF LIMITED TO BREAKDOWN OF SKIN, UNSPECIFIED WHETHER VARICOSE VEINS PRESENT (HCC): ICD-10-CM

## 2020-02-20 DIAGNOSIS — L97.221 VENOUS STASIS ULCER OF LEFT CALF LIMITED TO BREAKDOWN OF SKIN, UNSPECIFIED WHETHER VARICOSE VEINS PRESENT (HCC): ICD-10-CM

## 2020-02-20 RX ORDER — CHOLECALCIFEROL (VITAMIN D3) 125 MCG
CAPSULE ORAL
COMMUNITY

## 2020-02-20 RX ORDER — MINERAL OIL
ENEMA (ML) RECTAL
COMMUNITY
End: 2021-09-20

## 2020-02-20 RX ORDER — ASCORBIC ACID 250 MG
TABLET ORAL
COMMUNITY

## 2020-02-20 RX ORDER — FAMOTIDINE 20 MG/1
20 TABLET, FILM COATED ORAL 2 TIMES DAILY
COMMUNITY
End: 2021-09-20

## 2020-02-20 RX ORDER — FUROSEMIDE 40 MG/1
TABLET ORAL DAILY
COMMUNITY

## 2020-02-20 NOTE — PROGRESS NOTES
1. Have you been to the ER, urgent care clinic since your last visit? Hospitalized since your last visit? No    2. Have you seen or consulted any other health care providers outside of the [address] [place] [address] since your last visit? Include any pap smears or colon screening.  Yes When: new pt

## 2020-02-20 NOTE — PROGRESS NOTES
CC:  Ulcer of leg    HPI  Aleyda Joe is a 62 y.o. female with chronic venous stasis disease and history of ulcers in the past.  This recent ulcer is of an unknown duration but resulted when she stopped wearing compression stocking. Currently she has a 6 inch ace very loosely wrapped around the calf on the right and the foot is not wrapped. The left leg is wrapped in the middle and I do not know what the dressing material is. ROS   As outlined above    PE  Visit Vitals  /62 (BP 1 Location: Left arm, BP Patient Position: Sitting)   Pulse 71   Temp 97.6 °F (36.4 °C) (Oral)   Resp 18   Ht 5' 4\" (1.626 m)   Wt 202 lb (91.6 kg)   SpO2 93%   BMI 34.67 kg/m²     Right leg with chronic venous stasis changes and +2 edema. No ulcers. Left leg with a large shallow ulceration about 10 x 12 cm, with patchy areas of skin in it. Clean and a few areas have some exudate on them and are a little deeper. Cleaned and dressed with silvadene and telfa and Kerlex. Wrapped from toes to knees with a 6 inch Ace(from the other leg). IMP  No signs of infection  Venous stasis ulcerations on left  Venous stasis disease needs compression to heal    PLAN  discussed her getting the Juxta wrap again as she liked that. She needs hose or a good wrap on the right and the left needs an Ace wrap.   Silvadene, telfa, gauze and Ace every other day  RTO 3 weeks

## 2020-11-24 ENCOUNTER — OFFICE VISIT (OUTPATIENT)
Dept: SURGERY | Age: 60
End: 2020-11-24
Payer: MEDICAID

## 2020-11-24 VITALS
OXYGEN SATURATION: 96 % | BODY MASS INDEX: 32.61 KG/M2 | TEMPERATURE: 97.3 F | SYSTOLIC BLOOD PRESSURE: 144 MMHG | RESPIRATION RATE: 18 BRPM | WEIGHT: 191 LBS | DIASTOLIC BLOOD PRESSURE: 86 MMHG | HEART RATE: 86 BPM | HEIGHT: 64 IN

## 2020-11-24 DIAGNOSIS — K40.90 RIGHT INGUINAL HERNIA: Primary | ICD-10-CM

## 2020-11-24 PROCEDURE — 99215 OFFICE O/P EST HI 40 MIN: CPT | Performed by: SURGERY

## 2020-11-24 RX ORDER — TRIAMCINOLONE ACETONIDE OINTMENT USP, 0.05% 0.5 MG/G
OINTMENT TOPICAL
COMMUNITY
Start: 2020-09-11

## 2020-11-24 RX ORDER — FACIAL-BODY WIPES
10 EACH TOPICAL
COMMUNITY

## 2020-11-24 RX ORDER — CAMPHOR 0.45 %
GEL (GRAM) TOPICAL
COMMUNITY
Start: 2020-07-21

## 2020-11-24 RX ORDER — CLOTRIMAZOLE AND BETAMETHASONE DIPROPIONATE 10; .64 MG/G; MG/G
CREAM TOPICAL
COMMUNITY
Start: 2020-10-31

## 2020-11-24 RX ORDER — ISOSORBIDE MONONITRATE 30 MG/1
TABLET, EXTENDED RELEASE ORAL
COMMUNITY
Start: 2020-07-17

## 2020-11-24 RX ORDER — CONJUGATED ESTROGENS 0.62 MG/G
CREAM VAGINAL
COMMUNITY
Start: 2020-11-05

## 2020-11-24 RX ORDER — ZINC SULFATE 50(220)MG
CAPSULE ORAL
COMMUNITY
Start: 2020-06-02

## 2020-11-24 RX ORDER — DULAGLUTIDE 0.75 MG/.5ML
INJECTION, SOLUTION SUBCUTANEOUS
COMMUNITY
Start: 2020-04-01

## 2020-11-24 RX ORDER — DULOXETIN HYDROCHLORIDE 60 MG/1
CAPSULE, DELAYED RELEASE ORAL
COMMUNITY
Start: 2020-09-22

## 2020-11-24 RX ORDER — AMMONIUM LACTATE 12 G/100G
LOTION TOPICAL
COMMUNITY
Start: 2019-12-27

## 2020-11-24 RX ORDER — INSULIN LISPRO 100 [IU]/ML
5 INJECTION, SOLUTION INTRAVENOUS; SUBCUTANEOUS 2 TIMES DAILY
COMMUNITY
Start: 2020-08-12

## 2020-11-24 RX ORDER — BUMETANIDE 1 MG/1
TABLET ORAL
COMMUNITY
Start: 2020-10-18 | End: 2020-11-24

## 2020-11-24 RX ORDER — BUMETANIDE 1 MG/1
1 TABLET ORAL 2 TIMES DAILY
COMMUNITY
End: 2022-03-30

## 2020-11-24 NOTE — PROGRESS NOTES
Chief Complaint   Patient presents with    Possible Hernia     Seen at the request of Dr. Ramirez Covert, eval hernia. Pt here with Joselyn Dobbins. [facility] [address] [place] [address] [phone] phone, [phone] fax.

## 2020-11-24 NOTE — Clinical Note
11/25/20 Patient: Sia Patel YOB: 1960 Date of Visit: 11/24/2020 Bayron Jonas MD 
[address] [id number] VIA In Basket Dar Cruz MD 
[address] [facility] [address] [facility] [address] [id number] VIA In Basket Dear MD Dar Rodriguez MD, Thank you for referring Ms. Kathryn Browne to  [facility]  for evaluation. My notes for this consultation are attached. If you have questions, please do not hesitate to call me. I look forward to following your patient along with you.  
 
 
Sincerely, 
 
María Watson MD

## 2020-11-25 PROBLEM — K40.90 RIGHT INGUINAL HERNIA: Status: ACTIVE | Noted: 2020-11-25

## 2020-11-25 NOTE — PROGRESS NOTES
To: Dr. Ariana Stevenson  From: Jazzmine Galicia MD    Thank you for sending Raymond Starks to see us. Have a nice Thanksgiving. Please note that this dictation was completed with Bangcle, the computer voice recognition software. Quite often unanticipated grammatical, syntax, homophones, and other interpretive errors are inadvertently transcribed by the software. Please disregard these errors. Please excuse any errors that have escaped final proofreading. Encounter Date: 11/24/2020  History and Physical    Assessment:   Right inguinal hernia extending down into her right labia majora. On CAT scan there is small bowel within the hernia sac. There was no evidence of obstruction however. She has a history of intermittent pain but no signs or symptoms of obstruction. Body mass index is 32.79 kg/m². Truncal obesity. Comorbid history of stroke, coronary artery disease with a history of congestive heart failure, COPD and actively smoking, peripheral vascular disease, diabetes mellitus with sequelae. Her history of stroke is quite concerning in terms of general anesthesia. She is fairly noncompliant in terms of her diabetes management and subsequent skin changes and obesity would significantly increase her perioperative risk. Her smoking obviously puts her at continued risk as well. No anticoagulation. She does take a daily aspirin. Denies prior abdominal operations. Plan:   She certainly would be high risk for general anesthesia. I discussed this with the patient and her sister who both agree. In order to repair this, we would need to do it the robotic tap. Her pannus and skin infections would make open repair to high risk in terms of wound infection and potential mesh infection. Doing a transabdominal approach would be best from this perspective but it would require general anesthesia.       We discussed that there is no way to know for sure whether her operative risk is higher or her risk of strangulation without repair is higher. She and her sister would like to avoid surgery if possible. She understands that she will need to tolerate some degree of discomfort. In the meantime    I have asked them to make follow-up appointments with the neurologist and cardiologist that she saw at [address] [place] [address] so that they could assist us with risk stratification. The sister will get me the names of those doctors so that I can send them this note and follow-up with them. They would like to hold off on repair at this point given the significant operative risk. She will monitor her symptoms. She does understand that strangulation could be acute and very sudden and that she would likely not do well in that situation. If we get to the point of repair, we would need to accept the risks of surgery and anesthesia including stroke, heart attack and possibly death. She expresses understanding of this. HPI:   Xavier Bautista is a 62 y.o. female who is seen in consultation at the request of Dr. Leonid Eubanks for right inguinal hernia. This was noted on a prior CAT scan, but she was not previously having complaints of pain. Since Dr. Parvin Sellers met her she has begun to complain of discomfort in her right lower quadrant especially when she goes from sitting to standing. When she remains either sitting or standing the discomfort tends to ease up. Initially she was treated for yeast infection with some degree of panniculitis. Her obesity makes exam extremely difficult but Dr. Denny Breath review of prior medical records and x-rays revealed the right inguinal hernia. She tells me that she is not on any blood thinners. She does not regularly see a neurologist but has seen one at [address] [place] [address].  She also says she does not have a cardiologist but has seen 1 at [address] [place] [address].    Past Medical History:   Diagnosis Date    Arthritis     Asthma     Bronchitis     Chronic kidney disease     [facility]     Depression     Developmental delay     Environmental allergies     GERD (gastroesophageal reflux disease)     Hypercholesterolemia     Hypertension     Hypothyroid     IDDM (insulin dependent diabetes mellitus)     Morbid obesity (HCC)     Peripheral neuropathy     Thyroid disease     Venous insufficiency      Past Surgical History:   Procedure Laterality Date    HX AMPUTATION Bilateral     toes    HX COLONOSCOPY        Family History   Problem Relation Age of Onset    COPD Father     Alcohol abuse Father       Social History     Tobacco Use    Smoking status: Current Every Day Smoker     Packs/day: 0.50     Types: Cigarettes     Last attempt to quit: 2012     Years since quittin.9    Smokeless tobacco: Never Used   Substance Use Topics    Alcohol use: Yes     Comment: social      Current Outpatient Medications   Medication Sig    dulaglutide (Trulicity) 0.66 GV/9.3 mL sub-q pen     DULoxetine (Cymbalta) 60 mg capsule     conjugated estrogens (Premarin) 0.625 mg/gram vaginal cream     isosorbide mononitrate ER (IMDUR) 30 mg tablet     insulin lispro (HumaLOG U-100 Insulin) 100 unit/mL injection 5 Units by SubCUTAneous route two (2) times a day.  ammonium lactate (LAC-HYDRIN) 12 % lotion     clotrimazole-betamethasone (Lotrisone) topical cream     diphenhydrAMINE-zinc acetate 1%-0.1% (Benadryl Itch Stopping) topical cream     triamcinolone acetonide 0.05 % oint     bumetanide (BUMEX) 1 mg tablet Take 1 mg by mouth two (2) times a day.  bisacodyL (Dulcolax, bisacodyl,) 10 mg supp Insert 10 mg into rectum.  cholecalciferol, vitamin D3, (VITAMIN D3) 50 mcg (2,000 unit) tab Take  by mouth.  famotidine (PEPCID) 20 mg tablet Take 20 mg by mouth two (2) times a day.  insulin glargine (LANTUS U-100 INSULIN) 100 unit/mL injection 8 Units by SubCUTAneous route daily (after dinner).  Patient takes at 1830 each day    acetaminophen (TYLENOL) 325 mg tablet Take 2 Tabs by mouth every four (4) hours as needed for Fever (For temp greater than or equal to 38.5 C or 101.3 F (Unless hepatic failure or contrindicated). Give first line for fever. ).  potassium chloride (K-DUR, KLOR-CON) 20 mEq tablet Take 20 mEq by mouth two (2) times a day. Alternates day with torsemide     levothyroxine (SYNTHROID) 150 mcg tablet Take 100 mcg by mouth Daily (before breakfast).  ipratropium-albuterol (COMBIVENT RESPIMAT)  mcg/actuation inhaler Take 1 Puff by inhalation every six (6) hours as needed for Shortness of Breath.  aspirin delayed-release 81 mg tablet Take 81 mg by mouth daily.  zinc sulfate (ZINCATE) 220 (50) mg capsule     ascorbic acid, vitamin C, (VITAMIN C) 250 mg tablet Take  by mouth.  furosemide (LASIX) 40 mg tablet Take  by mouth daily.  fexofenadine (ALLEGRA) 180 mg tablet Take  by mouth.  HYDROcodone-acetaminophen (NORCO) 5-325 mg per tablet Take 1 Tab by mouth every six (6) hours as needed for Pain.  torsemide (DEMADEX) 20 mg tablet Take 20 mg by mouth every other day. Alternates administration w/ potassium supplement    silver sulfADIAZINE (SILVADENE) 1 % topical cream Apply 1 Each to affected area every Monday, Wednesday, Friday. Apply to legs    zinc 50 mg tab tablet Take 50 mg by mouth two (2) times a day.  insulin aspart U-100 (NOVOLOG FLEXPEN U-100 INSULIN) 100 unit/mL inpn 5 Units by SubCUTAneous route Before breakfast, lunch, and dinner. + SSI TIDAC:  140-199=2 u            200-249=3 u  250-299=5 u  300-349=8 u (Patient not taking: Reported on 11/24/2020)    gabapentin (NEURONTIN) 300 mg capsule Take 600 mg by mouth nightly. No current facility-administered medications for this visit. Allergies:  No Known Allergies     Review of Systems:  10 systems reviewed. See scanned sheet in \"Media\" section. See HPI for pertinent positives and negatives.       Objective:     Visit Vitals  BP (!) 144/86 (BP 1 Location: Left arm, BP Patient Position: Sitting)   Pulse 86   Temp 97.3 °F (36.3 °C) (Oral)   Resp 18   Ht 5' 4\" (1.626 m)   Wt 86.6 kg (191 lb)   SpO2 96%   BMI 32.79 kg/m²       Physical Exam:  General appearance  Alert, cooperative, no distress, appears older than stated age   [de-identified] Anicteric   Neck Supple       Lungs   Distant bilaterally but clear slight wheeze clears with cough   Heart  Regular rate and rhythm. Abdomen   Soft. Bowel sounds normal.  Truncal obesity with large pannus and underlying yeast infection. There is a fullness in the right groin. There is no tenderness to palpation of that area. Extremities Significant lower extremity edema bilaterally with venous stasis skin changes. Pulses 2+ right radial.  Pedal pulses are not palpable. Feet are warm. Skin Skin color, texture, turgor normal.       Neurologic  decreased sensation in the lower extremities.      Signed By: Jose Armando Dodge MD     November 25, 2020

## 2021-09-20 ENCOUNTER — APPOINTMENT (OUTPATIENT)
Dept: GENERAL RADIOLOGY | Age: 61
End: 2021-09-20
Attending: EMERGENCY MEDICINE
Payer: MEDICARE

## 2021-09-20 ENCOUNTER — HOSPITAL ENCOUNTER (EMERGENCY)
Age: 61
Discharge: HOME | End: 2021-09-20
Attending: EMERGENCY MEDICINE
Payer: MEDICARE

## 2021-09-20 VITALS
OXYGEN SATURATION: 96 % | RESPIRATION RATE: 16 BRPM | TEMPERATURE: 98 F | DIASTOLIC BLOOD PRESSURE: 95 MMHG | SYSTOLIC BLOOD PRESSURE: 148 MMHG | HEART RATE: 76 BPM

## 2021-09-20 DIAGNOSIS — S52.022A CLOSED FRACTURE OF OLECRANON PROCESS OF LEFT ULNA, INITIAL ENCOUNTER: Primary | ICD-10-CM

## 2021-09-20 PROCEDURE — 77030008368 HC SPLNT ORTHGLS BSNM -B

## 2021-09-20 PROCEDURE — 73080 X-RAY EXAM OF ELBOW: CPT

## 2021-09-20 PROCEDURE — 74011250637 HC RX REV CODE- 250/637: Performed by: EMERGENCY MEDICINE

## 2021-09-20 PROCEDURE — 75810000053 HC SPLINT APPLICATION

## 2021-09-20 PROCEDURE — 99283 EMERGENCY DEPT VISIT LOW MDM: CPT

## 2021-09-20 RX ORDER — SERTRALINE HYDROCHLORIDE 100 MG/1
TABLET, FILM COATED ORAL
COMMUNITY
Start: 2021-05-25

## 2021-09-20 RX ORDER — CEPHALEXIN 500 MG/1
CAPSULE ORAL
COMMUNITY
Start: 2021-09-16 | End: 2021-09-23

## 2021-09-20 RX ORDER — TRAMADOL HYDROCHLORIDE 100 MG/1
TABLET, EXTENDED RELEASE ORAL
COMMUNITY
Start: 2021-08-11 | End: 2021-09-20

## 2021-09-20 RX ORDER — HYDROXYZINE PAMOATE 25 MG/1
CAPSULE ORAL
COMMUNITY
Start: 2021-03-29

## 2021-09-20 RX ORDER — SPIRONOLACTONE 25 MG/1
TABLET ORAL
COMMUNITY
Start: 2021-04-23

## 2021-09-20 RX ORDER — OXYCODONE AND ACETAMINOPHEN 5; 325 MG/1; MG/1
TABLET ORAL
COMMUNITY
Start: 2021-08-10

## 2021-09-20 RX ORDER — PANTOPRAZOLE SODIUM 40 MG/1
TABLET, DELAYED RELEASE ORAL
COMMUNITY
Start: 2021-02-03

## 2021-09-20 RX ORDER — FLUCONAZOLE 200 MG/1
TABLET ORAL
COMMUNITY
Start: 2021-09-16 | End: 2021-09-23

## 2021-09-20 RX ORDER — OXYCODONE HYDROCHLORIDE 5 MG/1
5 TABLET ORAL
Status: COMPLETED | OUTPATIENT
Start: 2021-09-20 | End: 2021-09-20

## 2021-09-20 RX ORDER — LORATADINE AND PSEUDOEPHEDRINE SULFATE 10; 240 MG/1; MG/1
TABLET, EXTENDED RELEASE ORAL
COMMUNITY
Start: 2021-09-08

## 2021-09-20 RX ADMIN — OXYCODONE HYDROCHLORIDE 5 MG: 5 TABLET ORAL at 18:52

## 2021-09-20 NOTE — ED NOTES
Report given to [facility] [place]. Pt transported back by sister Ryan Bruce.

## 2021-09-21 NOTE — ED PROVIDER NOTES
EMERGENCY DEPARTMENT HISTORY AND PHYSICAL EXAM          Date: 2021  Patient Name: Rodney Khan    History of Presenting Illness     Chief Complaint   Patient presents with    Elbow Injury       History Provided By: Patient, nursing home report    HPI: Rodney Khan is a 56 y.o. female, pmhx listed below, who presents to the ED c/o elbow fracture. Patient reports she fell onto the ground earlier today. Nursing home had x-rays taken that showed left elbow fracture. Patient denies weakness or numbness in hand. States she was given pain medicine earlier today around 11 AM, now reports she has pain in elbow. Pain is moderate, severe with movement. PCP: Pardeep De Souza MD    There are no other complaints, changes, or physical findings at this time.          Past History       Past Medical History:  Past Medical History:   Diagnosis Date    Arthritis     Asthma     Bronchitis     Chronic kidney disease     [facility] ([facility]     Depression     Developmental delay     Environmental allergies     GERD (gastroesophageal reflux disease)     Hypercholesterolemia     Hypertension     Hypothyroid     IDDM (insulin dependent diabetes mellitus)     Morbid obesity (HCC)     Peripheral neuropathy     Thyroid disease     Venous insufficiency        Past Surgical History:  Past Surgical History:   Procedure Laterality Date    HX AMPUTATION Bilateral     toes    HX COLONOSCOPY         Family History:  Family History   Problem Relation Age of Onset    COPD Father     Alcohol abuse Father        Social History:  Social History     Tobacco Use    Smoking status: Current Every Day Smoker     Packs/day: 0.50     Types: Cigarettes     Last attempt to quit: 2012     Years since quittin.7    Smokeless tobacco: Never Used   Substance Use Topics    Alcohol use: Yes     Comment: social    Drug use: No       Current Outpatient Medications   Medication Sig Dispense Refill    cephALEXin (Keflex) 500 mg capsule       fluconazole (Diflucan) 200 mg tablet       hydrOXYzine pamoate (VISTARIL) 25 mg capsule       insulin regular (HumuLIN R Regular U-100 Insuln) 100 unit/mL injection       loratadine-pseudoephedrine (Claritin-D 24 Hour)  mg per tablet       oxyCODONE-acetaminophen (PERCOCET) 5-325 mg per tablet       pantoprazole (PROTONIX) 40 mg tablet       sertraline (ZOLOFT) 100 mg tablet       spironolactone (Aldactone) 25 mg tablet       tiotropium (Spiriva with HandiHaler) 18 mcg inhalation capsule       dulaglutide (Trulicity) 7.44 VJ/8.8 mL sub-q pen       DULoxetine (Cymbalta) 60 mg capsule       conjugated estrogens (Premarin) 0.625 mg/gram vaginal cream       isosorbide mononitrate ER (IMDUR) 30 mg tablet       insulin lispro (HumaLOG U-100 Insulin) 100 unit/mL injection 5 Units by SubCUTAneous route two (2) times a day.  ammonium lactate (LAC-HYDRIN) 12 % lotion       clotrimazole-betamethasone (Lotrisone) topical cream       diphenhydrAMINE-zinc acetate 1%-0.1% (Benadryl Itch Stopping) topical cream       zinc sulfate (ZINCATE) 220 (50) mg capsule       triamcinolone acetonide 0.05 % oint       bumetanide (BUMEX) 1 mg tablet Take 1 mg by mouth two (2) times a day.  bisacodyL (Dulcolax, bisacodyl,) 10 mg supp Insert 10 mg into rectum.  cholecalciferol, vitamin D3, (VITAMIN D3) 50 mcg (2,000 unit) tab Take  by mouth.  ascorbic acid, vitamin C, (VITAMIN C) 250 mg tablet Take  by mouth.  furosemide (LASIX) 40 mg tablet Take  by mouth daily.  insulin glargine (LANTUS U-100 INSULIN) 100 unit/mL injection 8 Units by SubCUTAneous route daily (after dinner). Patient takes at 1830 each day 1 Vial 0    potassium chloride (K-DUR, KLOR-CON) 20 mEq tablet Take 20 mEq by mouth two (2) times a day. Alternates day with torsemide       torsemide (DEMADEX) 20 mg tablet Take 20 mg by mouth every other day.  Alternates administration w/ potassium supplement      silver sulfADIAZINE (SILVADENE) 1 % topical cream Apply 1 Each to affected area every Monday, Wednesday, Friday. Apply to legs      zinc 50 mg tab tablet Take 50 mg by mouth two (2) times a day.  insulin aspart U-100 (NOVOLOG FLEXPEN U-100 INSULIN) 100 unit/mL inpn 5 Units by SubCUTAneous route Before breakfast, lunch, and dinner. + SSI TIDAC:  140-199=2 u            200-249=3 u  250-299=5 u  300-349=8 u (Patient not taking: Reported on 11/24/2020) 3 Adjustable Dose Pre-filled Pen Syringe 0    levothyroxine (SYNTHROID) 150 mcg tablet Take 100 mcg by mouth Daily (before breakfast).  gabapentin (NEURONTIN) 300 mg capsule Take 600 mg by mouth nightly.  aspirin delayed-release 81 mg tablet Take 81 mg by mouth daily. Allergies:  No Known Allergies      Review of Systems   Review of Systems   Constitutional: Negative for chills and fever. HENT: Negative for congestion. Eyes: Negative for pain. Respiratory: Negative for shortness of breath. Cardiovascular: Negative for chest pain. Gastrointestinal: Negative for abdominal pain. Genitourinary: Negative for flank pain. Musculoskeletal: Negative for back pain. Neurological: Negative for headaches. Psychiatric/Behavioral: Negative for agitation. Physical Exam     Vital Signs-Reviewed the patient's vital signs. No data found. Physical Exam  Constitutional:       Appearance: Normal appearance. HENT:      Head: Normocephalic and atraumatic. Mouth/Throat:      Mouth: Mucous membranes are moist.   Eyes:      Pupils: Pupils are equal, round, and reactive to light. Cardiovascular:      Rate and Rhythm: Normal rate and regular rhythm. Pulmonary:      Effort: Pulmonary effort is normal.      Breath sounds: Normal breath sounds. Chest:          Comments: Right anterior chest wall contusion with associated abrasion, no active bleeding. Bilateral clavicles nontender.   Musculoskeletal: General: Swelling present. Comments: Left elbow swelling and pain especially with range of motion. Shoulder and wrist nontender. Sensation normal in median, radial, ulnar nerve distributions. Normal radial pulse. When being taken to the bathroom by her nurse, patient was noted using both arms to push herself up out of bed. Skin:     General: Skin is warm and dry. Neurological:      Mental Status: She is alert and oriented to person, place, and time. Psychiatric:         Mood and Affect: Mood normal.         Diagnostic Study Results     Labs -   No results found for this or any previous visit (from the past 12 hour(s)). Radiologic Studies -   XR ELBOW LT MIN 3 V   Final Result   Intra-articular proximal ulnar fracture. CT Results  (Last 48 hours)    None        CXR Results  (Last 48 hours)    None              Medical Decision Making   I am the first provider for this patient. I reviewed the vital signs, available nursing notes, past medical history, past surgical history, family history and social history. Records Reviewed: Nursing Notes and Old Medical Records    Provider Notes (Medical Decision Making):   MDM: 68-year-old female with fall and right elbow fracture. Unable to see images from nursing home here -we will obtain x-rays now in order to assess fracture location and treatment options. Initial assessment performed. The patients presenting problems have been discussed, and they are in agreement with the care plan formulated and outlined with them. I have encouraged them to ask questions as they arise throughout their visit. PROGRESS NOTE:    Intra-articular fracture with no neurological changes. Will plan for posterior mold long-arm splint and sling with orthopedics follow-up. Discharge note:  Pt re-evaluated and noted to be feeling better, ready for discharge. Updated pt on all final results. Will follow up as instructed.  All questions have been answered, pt voiced understanding and agreement with plan. Specific return precautions provided as well as instructions to return to the ED should sx worsen at any time. Vital signs stable for discharge. Diagnosis     Clinical Impression:   1. Closed fracture of olecranon process of left ulna, initial encounter            Disposition:  Discharged    Discharge Medication List as of 9/20/2021  6:38 PM            Please note, this dictation was completed with FIZZA, the computer voice recognition software. Quite often unanticipated grammatical, syntax, homophones, and other interpretive errors are inadvertently transcribed by the computer software. Please disregard these errors. Please excuse any errors that have escaped final proof reading.

## 2022-03-18 PROBLEM — J96.01 ACUTE RESPIRATORY FAILURE WITH HYPOXIA (HCC): Status: ACTIVE | Noted: 2018-11-14

## 2022-03-18 PROBLEM — I65.23 BILATERAL CAROTID ARTERY STENOSIS: Status: ACTIVE | Noted: 2019-01-09

## 2022-03-18 PROBLEM — R77.8 ELEVATED TROPONIN: Status: ACTIVE | Noted: 2019-01-08

## 2022-03-19 PROBLEM — I21.4 NSTEMI (NON-ST ELEVATED MYOCARDIAL INFARCTION) (HCC): Status: ACTIVE | Noted: 2018-11-14

## 2022-03-19 PROBLEM — K40.90 RIGHT INGUINAL HERNIA: Status: ACTIVE | Noted: 2020-11-25

## 2022-03-19 PROBLEM — I63.9 CVA (CEREBRAL VASCULAR ACCIDENT) (HCC): Status: ACTIVE | Noted: 2019-01-07

## 2022-03-19 PROBLEM — J44.1 COPD EXACERBATION (HCC): Status: ACTIVE | Noted: 2018-11-14

## 2022-03-19 PROBLEM — N17.9 AKI (ACUTE KIDNEY INJURY) (HCC): Status: ACTIVE | Noted: 2018-03-15

## 2022-03-30 ENCOUNTER — APPOINTMENT (OUTPATIENT)
Dept: CT IMAGING | Age: 62
End: 2022-03-30
Attending: FAMILY MEDICINE
Payer: MEDICARE

## 2022-03-30 ENCOUNTER — APPOINTMENT (OUTPATIENT)
Dept: GENERAL RADIOLOGY | Age: 62
End: 2022-03-30
Attending: EMERGENCY MEDICINE
Payer: MEDICARE

## 2022-03-30 ENCOUNTER — APPOINTMENT (OUTPATIENT)
Dept: GENERAL RADIOLOGY | Age: 62
End: 2022-03-30
Attending: FAMILY MEDICINE
Payer: MEDICARE

## 2022-03-30 ENCOUNTER — HOSPITAL ENCOUNTER (EMERGENCY)
Age: 62
Discharge: HOME | End: 2022-03-30
Attending: FAMILY MEDICINE
Payer: MEDICARE

## 2022-03-30 VITALS
HEIGHT: 61 IN | HEART RATE: 65 BPM | DIASTOLIC BLOOD PRESSURE: 76 MMHG | SYSTOLIC BLOOD PRESSURE: 140 MMHG | TEMPERATURE: 98 F | OXYGEN SATURATION: 96 % | BODY MASS INDEX: 37.19 KG/M2 | RESPIRATION RATE: 17 BRPM | WEIGHT: 197 LBS

## 2022-03-30 DIAGNOSIS — S61.502A AVULSION OF SKIN OF LEFT WRIST, INITIAL ENCOUNTER: ICD-10-CM

## 2022-03-30 DIAGNOSIS — S12.201A CLOSED NONDISPLACED FRACTURE OF THIRD CERVICAL VERTEBRA, UNSPECIFIED FRACTURE MORPHOLOGY, INITIAL ENCOUNTER (HCC): Primary | ICD-10-CM

## 2022-03-30 DIAGNOSIS — S12.401A CLOSED NONDISPLACED FRACTURE OF FIFTH CERVICAL VERTEBRA, UNSPECIFIED FRACTURE MORPHOLOGY, INITIAL ENCOUNTER (HCC): ICD-10-CM

## 2022-03-30 DIAGNOSIS — S00.83XA: ICD-10-CM

## 2022-03-30 DIAGNOSIS — S60.212A TRAUMATIC HEMATOMA OF LEFT WRIST, INITIAL ENCOUNTER: ICD-10-CM

## 2022-03-30 DIAGNOSIS — S01.81XA FACIAL LACERATION, INITIAL ENCOUNTER: ICD-10-CM

## 2022-03-30 LAB
ALBUMIN SERPL-MCNC: 2.8 G/DL (ref 3.5–5)
ALBUMIN/GLOB SERPL: 0.7 {RATIO} (ref 1.1–2.2)
ALP SERPL-CCNC: 150 U/L (ref 45–117)
ALT SERPL-CCNC: 14 U/L (ref 12–78)
ANION GAP SERPL CALC-SCNC: 7 MMOL/L (ref 5–15)
AST SERPL-CCNC: 15 U/L (ref 15–37)
BASOPHILS # BLD: 0.1 K/UL (ref 0–0.1)
BASOPHILS NFR BLD: 1 % (ref 0–1)
BILIRUB SERPL-MCNC: 0.3 MG/DL (ref 0.2–1)
BUN SERPL-MCNC: 39 MG/DL (ref 6–20)
BUN/CREAT SERPL: 22 (ref 12–20)
CALCIUM SERPL-MCNC: 9.3 MG/DL (ref 8.5–10.1)
CHLORIDE SERPL-SCNC: 96 MMOL/L (ref 97–108)
CO2 SERPL-SCNC: 30 MMOL/L (ref 21–32)
COMMENT, HOLDF: NORMAL
CREAT SERPL-MCNC: 1.75 MG/DL (ref 0.55–1.02)
DIFFERENTIAL METHOD BLD: ABNORMAL
EOSINOPHIL # BLD: 0.1 K/UL (ref 0–0.4)
EOSINOPHIL NFR BLD: 1 % (ref 0–7)
ERYTHROCYTE [DISTWIDTH] IN BLOOD BY AUTOMATED COUNT: 18.5 % (ref 11.5–14.5)
FLUAV RNA SPEC QL NAA+PROBE: NOT DETECTED
FLUBV RNA SPEC QL NAA+PROBE: NOT DETECTED
GLOBULIN SER CALC-MCNC: 4 G/DL (ref 2–4)
GLUCOSE SERPL-MCNC: 241 MG/DL (ref 65–100)
HCT VFR BLD AUTO: 29.3 % (ref 35–47)
HGB BLD-MCNC: 8.3 G/DL (ref 11.5–16)
IMM GRANULOCYTES # BLD AUTO: 0.1 K/UL (ref 0–0.04)
IMM GRANULOCYTES NFR BLD AUTO: 1 % (ref 0–0.5)
LYMPHOCYTES # BLD: 0.6 K/UL (ref 0.8–3.5)
LYMPHOCYTES NFR BLD: 6 % (ref 12–49)
MCH RBC QN AUTO: 18.9 PG (ref 26–34)
MCHC RBC AUTO-ENTMCNC: 28.3 G/DL (ref 30–36.5)
MCV RBC AUTO: 66.6 FL (ref 80–99)
MONOCYTES # BLD: 0.7 K/UL (ref 0–1)
MONOCYTES NFR BLD: 7 % (ref 5–13)
NEUTS SEG # BLD: 8.4 K/UL (ref 1.8–8)
NEUTS SEG NFR BLD: 84 % (ref 32–75)
NRBC # BLD: 0 K/UL (ref 0–0.01)
NRBC BLD-RTO: 0 PER 100 WBC
PLATELET # BLD AUTO: 380 K/UL (ref 150–400)
PLATELET COMMENTS,PCOM: ABNORMAL
PMV BLD AUTO: 8.9 FL (ref 8.9–12.9)
POTASSIUM SERPL-SCNC: 4.1 MMOL/L (ref 3.5–5.1)
PROT SERPL-MCNC: 6.8 G/DL (ref 6.4–8.2)
RBC # BLD AUTO: 4.4 M/UL (ref 3.8–5.2)
RBC MORPH BLD: ABNORMAL
SAMPLES BEING HELD,HOLD: NORMAL
SARS-COV-2, COV2: NOT DETECTED
SODIUM SERPL-SCNC: 133 MMOL/L (ref 136–145)
WBC # BLD AUTO: 10 K/UL (ref 3.6–11)

## 2022-03-30 PROCEDURE — 72125 CT NECK SPINE W/O DYE: CPT

## 2022-03-30 PROCEDURE — 80053 COMPREHEN METABOLIC PANEL: CPT

## 2022-03-30 PROCEDURE — 73562 X-RAY EXAM OF KNEE 3: CPT

## 2022-03-30 PROCEDURE — 36415 COLL VENOUS BLD VENIPUNCTURE: CPT

## 2022-03-30 PROCEDURE — 74011250637 HC RX REV CODE- 250/637: Performed by: FAMILY MEDICINE

## 2022-03-30 PROCEDURE — 74011250637 HC RX REV CODE- 250/637: Performed by: EMERGENCY MEDICINE

## 2022-03-30 PROCEDURE — 73130 X-RAY EXAM OF HAND: CPT

## 2022-03-30 PROCEDURE — 85025 COMPLETE CBC W/AUTO DIFF WBC: CPT

## 2022-03-30 PROCEDURE — 70450 CT HEAD/BRAIN W/O DYE: CPT

## 2022-03-30 PROCEDURE — 87636 SARSCOV2 & INF A&B AMP PRB: CPT

## 2022-03-30 PROCEDURE — 73110 X-RAY EXAM OF WRIST: CPT

## 2022-03-30 PROCEDURE — 70498 CT ANGIOGRAPHY NECK: CPT

## 2022-03-30 PROCEDURE — 70486 CT MAXILLOFACIAL W/O DYE: CPT

## 2022-03-30 PROCEDURE — 99285 EMERGENCY DEPT VISIT HI MDM: CPT

## 2022-03-30 PROCEDURE — 74011000636 HC RX REV CODE- 636: Performed by: FAMILY MEDICINE

## 2022-03-30 RX ORDER — DEXTROMETHORPHAN HYDROBROMIDE, GUAIFENESIN 5; 100 MG/5ML; MG/5ML
LIQUID ORAL
COMMUNITY
Start: 2022-02-25

## 2022-03-30 RX ORDER — AMITRIPTYLINE HYDROCHLORIDE 50 MG/1
TABLET, FILM COATED ORAL
COMMUNITY
Start: 2022-02-25

## 2022-03-30 RX ORDER — SENNOSIDES 8.6 MG/1
TABLET ORAL
COMMUNITY
Start: 2021-11-30

## 2022-03-30 RX ORDER — BUMETANIDE 0.5 MG/1
TABLET ORAL
COMMUNITY
Start: 2022-02-11

## 2022-03-30 RX ORDER — ROPINIROLE 2 MG/1
TABLET, FILM COATED ORAL
COMMUNITY
Start: 2022-01-28

## 2022-03-30 RX ORDER — NYSTATIN 100000 [USP'U]/G
POWDER TOPICAL
COMMUNITY
Start: 2022-02-02

## 2022-03-30 RX ORDER — TRAMADOL HYDROCHLORIDE 50 MG/1
TABLET ORAL
COMMUNITY
Start: 2022-02-01

## 2022-03-30 RX ORDER — BISACODYL 5 MG
TABLET, DELAYED RELEASE (ENTERIC COATED) ORAL
COMMUNITY
Start: 2021-12-31

## 2022-03-30 RX ORDER — BUSPIRONE HYDROCHLORIDE 5 MG/1
TABLET ORAL
COMMUNITY
Start: 2022-02-25

## 2022-03-30 RX ORDER — OXYCODONE AND ACETAMINOPHEN 5; 325 MG/1; MG/1
1 TABLET ORAL
Status: COMPLETED | OUTPATIENT
Start: 2022-03-30 | End: 2022-03-30

## 2022-03-30 RX ORDER — MECLIZINE HCL 25MG 25 MG/1
TABLET, CHEWABLE ORAL
COMMUNITY
Start: 2021-10-26

## 2022-03-30 RX ORDER — IBUPROFEN/PSEUDOEPHEDRINE HCL 200MG-30MG
TABLET ORAL
COMMUNITY
Start: 2022-02-25

## 2022-03-30 RX ORDER — FEXOFENADINE HYDROCHLORIDE 180 MG/1
TABLET, FILM COATED ORAL
COMMUNITY
Start: 2022-02-25

## 2022-03-30 RX ADMIN — OXYCODONE HYDROCHLORIDE AND ACETAMINOPHEN 1 TABLET: 5; 325 TABLET ORAL at 10:46

## 2022-03-30 RX ADMIN — OXYCODONE AND ACETAMINOPHEN 1 TABLET: 5; 325 TABLET ORAL at 08:36

## 2022-03-30 RX ADMIN — IOPAMIDOL 50 ML: 755 INJECTION, SOLUTION INTRAVENOUS at 09:52

## 2022-03-30 NOTE — DISCHARGE INSTRUCTIONS
--Continue with your pain medications as you have been taking. Between the Percocet, you can take Tylenol 1000 mg every 6 hours as needed for pain. --Keep the neck brace in position until you see your spine surgeon next week.

## 2022-03-30 NOTE — ED PROVIDER NOTES
EMERGENCY DEPARTMENT HISTORY AND PHYSICAL EXAM          Date: 3/30/2022  Patient Name: Jazmín Elkins    History of Presenting Illness     Chief Complaint   Patient presents with    Fall    Laceration       History Provided By: Patient    HPI: Jazmín Elkins is a 59 y.o. female, pmhx below, who was brought to the ED by EMS because of a fall. She was trying to walk to the bathroom when she fell, resulting in a large hematoma on her left jaw, a laceration to her left eyebrow, a hematoma/ laceration on her left wrist, and sharp, electric-type pains to her left hand. She denies any neck pain or weakness in her arms or legs. She was not feeling poorly; she reports that she was simply walking to the bathroom and \"the next thing I knew I was on the floor. \"    PCP: Jo Collins MD    Allergies:   Social Hx: 5 cig/daytobacco, -EtOH, -Illicit Drugs; Lives at the [facility]. There are no other complaints, changes, or physical findings at this time.      Current Outpatient Medications   Medication Sig Dispense Refill    meclizine (ANTIVERT) 25 mg chewable tablet       senna (SENOKOT) 8.6 mg tablet       melatonin, disintegrating, 3 mg TbDi tablet       acetaminophen (TYLENOL) 650 mg TbER       amitriptyline (ELAVIL) 50 mg tablet       busPIRone (BUSPAR) 5 mg tablet       Allergy Relief, fexofenadine, 180 mg tablet       nystatin (MYCOSTATIN) powder       rOPINIRole (REQUIP) 2 mg tablet       traMADoL (ULTRAM) 50 mg tablet       bisacodyL (DULCOLAX) 5 mg EC tablet       bumetanide (BUMEX) 0.5 mg tablet       hydrOXYzine pamoate (VISTARIL) 25 mg capsule       insulin regular (HumuLIN R Regular U-100 Insuln) 100 unit/mL injection       loratadine-pseudoephedrine (Claritin-D 24 Hour)  mg per tablet       oxyCODONE-acetaminophen (PERCOCET) 5-325 mg per tablet       pantoprazole (PROTONIX) 40 mg tablet       sertraline (ZOLOFT) 100 mg tablet       spironolactone (Aldactone) 25 mg tablet       tiotropium (Spiriva with HandiHaler) 18 mcg inhalation capsule       dulaglutide (Trulicity) 8.45 ZO/7.2 mL sub-q pen       DULoxetine (Cymbalta) 60 mg capsule       conjugated estrogens (Premarin) 0.625 mg/gram vaginal cream       isosorbide mononitrate ER (IMDUR) 30 mg tablet       insulin lispro (HumaLOG U-100 Insulin) 100 unit/mL injection 5 Units by SubCUTAneous route two (2) times a day.  ammonium lactate (LAC-HYDRIN) 12 % lotion       clotrimazole-betamethasone (Lotrisone) topical cream       diphenhydrAMINE-zinc acetate 1%-0.1% (Benadryl Itch Stopping) topical cream       zinc sulfate (ZINCATE) 220 (50) mg capsule       triamcinolone acetonide 0.05 % oint       bisacodyL (Dulcolax, bisacodyl,) 10 mg supp Insert 10 mg into rectum.  cholecalciferol, vitamin D3, (VITAMIN D3) 50 mcg (2,000 unit) tab Take  by mouth.  ascorbic acid, vitamin C, (VITAMIN C) 250 mg tablet Take  by mouth.  furosemide (LASIX) 40 mg tablet Take  by mouth daily.  insulin glargine (LANTUS U-100 INSULIN) 100 unit/mL injection 8 Units by SubCUTAneous route daily (after dinner). Patient takes at 1830 each day 1 Vial 0    potassium chloride (K-DUR, KLOR-CON) 20 mEq tablet Take 20 mEq by mouth two (2) times a day. Alternates day with torsemide       torsemide (DEMADEX) 20 mg tablet Take 20 mg by mouth every other day. Alternates administration w/ potassium supplement      silver sulfADIAZINE (SILVADENE) 1 % topical cream Apply 1 Each to affected area every Monday, Wednesday, Friday. Apply to legs      zinc 50 mg tab tablet Take 50 mg by mouth two (2) times a day.       insulin aspart U-100 (NOVOLOG FLEXPEN U-100 INSULIN) 100 unit/mL inpn 5 Units by SubCUTAneous route Before breakfast, lunch, and dinner. + SSI TIDAC:  140-199=2 u            200-249=3 u  250-299=5 u  300-349=8 u (Patient not taking: Reported on 11/24/2020) 3 Adjustable Dose Pre-filled Pen Syringe 0    gabapentin (NEURONTIN) 300 mg capsule Take 600 mg by mouth nightly.  aspirin delayed-release 81 mg tablet Take 81 mg by mouth daily. Past History     Past Medical History:  Past Medical History:   Diagnosis Date    Arthritis     Asthma     Bronchitis     Chronic kidney disease     Congestive heart failure ([address]     Depression     Developmental delay     Environmental allergies     GERD (gastroesophageal reflux disease)     Hypercholesterolemia     Hypertension     Hypothyroid     IDDM (insulin dependent diabetes mellitus)     Morbid obesity (HCC)     Peripheral neuropathy     Thyroid disease     Venous insufficiency        Past Surgical History:  Past Surgical History:   Procedure Laterality Date    HX AMPUTATION Bilateral     toes    HX COLONOSCOPY         Family History:  Family History   Problem Relation Age of Onset    COPD Father     Alcohol abuse Father        Social History:  Social History     Tobacco Use    Smoking status: Current Every Day Smoker     Packs/day: 0.25     Types: Cigarettes     Last attempt to quit: 2012     Years since quittin.3    Smokeless tobacco: Never Used   Vaping Use    Vaping Use: Never used   Substance Use Topics    Alcohol use: Yes     Comment: occasionally    Drug use: No       Allergies:  No Known Allergies      Review of Systems   Review of Systems   Constitutional: Negative for appetite change and fever. HENT: Positive for facial swelling. Negative for congestion and sore throat. Eyes: Negative for photophobia and visual disturbance. Respiratory: Negative for cough and shortness of breath. Cardiovascular: Negative for chest pain and leg swelling. Gastrointestinal: Negative for abdominal pain, diarrhea and vomiting. Genitourinary: Negative for dysuria and hematuria. Musculoskeletal: Negative for back pain, myalgias and neck pain. Skin: Positive for wound. Negative for rash. Neurological: Negative for dizziness, weakness and headaches. Physical Exam   Physical Exam  Constitutional:       Appearance: Normal appearance. HENT:      Head: Normocephalic. Jaw: Swelling present. Comments: --8 mm laceration over lateral left brow. Oozing blood. --Large 3 cm hematoma over left anterolateral mandible. No intraoral lacs. Right Ear: External ear normal.      Left Ear: External ear normal.      Nose: Nose normal.      Mouth/Throat:      Mouth: Mucous membranes are moist.   Eyes:      Extraocular Movements: Extraocular movements intact. Conjunctiva/sclera: Conjunctivae normal.      Pupils: Pupils are equal, round, and reactive to light. Neck:      Comments: Very mild tenderness to palpation at C5. Noted only after CT scan results known. Cardiovascular:      Rate and Rhythm: Normal rate and regular rhythm. Pulmonary:      Effort: Pulmonary effort is normal.      Breath sounds: Normal breath sounds. No wheezing. Chest:      Chest wall: No tenderness. Abdominal:      General: Abdomen is flat. Palpations: Abdomen is soft. Tenderness: There is no abdominal tenderness. There is no guarding. Musculoskeletal:         General: Normal range of motion. Cervical back: No rigidity or tenderness. Comments: --Left wrist: fairly large hematoma w a 2 cm laceration/skin tear, but minimal tenderness. Fingers mobile, sensation in tact. --Right hand: normal exam. No tenderness, lacerations, swelling. Fingers mobile. Diminished sensation to LT over thumb, index, long fingers. Skin:     General: Skin is warm and dry. Capillary Refill: Capillary refill takes less than 2 seconds. Neurological:      Mental Status: She is alert and oriented to person, place, and time. Mental status is at baseline. Motor: No weakness. Diagnostic Study Results     Labs -   Results for Richardson Villatoro (MRN [id number]) as of 4/1/2022 18:56   Ref.  Range 3/30/2022 08:31   WBC Latest Ref Range: 3.6 - 11.0 K/uL 10.0   NRBC Latest Ref Range: 0  WBC 0.0   RBC Latest Ref Range: 3.80 - 5.20 M/uL 4.40   HGB Latest Ref Range: 11.5 - 16.0 g/dL 8.3 (L)   HCT Latest Ref Range: 35.0 - 47.0 % 29.3 (L)   MCV Latest Ref Range: 80.0 - 99.0 FL 66.6 (L)   MCH Latest Ref Range: 26.0 - 34.0 PG 18.9 (L)   MCHC Latest Ref Range: 30.0 - 36.5 g/dL 28.3 (L)   RDW Latest Ref Range: 11.5 - 14.5 % 18.5 (H)   PLATELET Latest Ref Range: 150 - 400 K/uL 380   MPV Latest Ref Range: 8.9 - 12.9 FL 8.9   NEUTROPHILS Latest Ref Range: 32 - 75 % 84 (H)   LYMPHOCYTES Latest Ref Range: 12 - 49 % 6 (L)   MONOCYTES Latest Ref Range: 5 - 13 % 7   EOSINOPHILS Latest Ref Range: 0 - 7 % 1   BASOPHILS Latest Ref Range: 0 - 1 % 1   IMMATURE GRANULOCYTES Latest Ref Range: 0.0 - 0.5 % 1 (H)   DF Latest Units:   SMEAR SCANNED   ABSOLUTE NRBC Latest Ref Range: 0.00 - 0.01 K/uL 0.00   ABS. NEUTROPHILS Latest Ref Range: 1.8 - 8.0 K/UL 8.4 (H)   ABS. IMM. GRANS. Latest Ref Range: 0.00 - 0.04 K/UL 0.1 (H)   ABS. LYMPHOCYTES Latest Ref Range: 0.8 - 3.5 K/UL 0.6 (L)   ABS. MONOCYTES Latest Ref Range: 0.0 - 1.0 K/UL 0.7   ABS. EOSINOPHILS Latest Ref Range: 0.0 - 0.4 K/UL 0.1   ABS. BASOPHILS Latest Ref Range: 0.0 - 0.1 K/UL 0.1   RBC COMMENTS Latest Units:   ANISOCYTOSIS. ..    PLATELET COMMENTS Latest Units:   Increased Platelets   Sodium Latest Ref Range: 136 - 145 mmol/L 133 (L)   Potassium Latest Ref Range: 3.5 - 5.1 mmol/L 4.1   Chloride Latest Ref Range: 97 - 108 mmol/L 96 (L)   CO2 Latest Ref Range: 21 - 32 mmol/L 30   Anion gap Latest Ref Range: 5 - 15 mmol/L 7   Glucose Latest Ref Range: 65 - 100 mg/dL 241 (H)   BUN Latest Ref Range: 6 - 20 MG/DL 39 (H)   Creatinine Latest Ref Range: 0.55 - 1.02 MG/DL 1.75 (H)   BUN/Creatinine ratio Latest Ref Range: 12 - 20   22 (H)   Calcium Latest Ref Range: 8.5 - 10.1 MG/DL 9.3   GFR est non-AA Latest Ref Range: >60 ml/min/1.73m2 29 (L)   GFR est AA Latest Ref Range: >60 ml/min/1.73m2 36 (L)   Bilirubin, total Latest Ref Range: 0.2 - 1.0 MG/DL 0.3   Protein, total Latest Ref Range: 6.4 - 8.2 g/dL 6.8   Albumin Latest Ref Range: 3.5 - 5.0 g/dL 2.8 (L)   Globulin Latest Ref Range: 2.0 - 4.0 g/dL 4.0   A-G Ratio Latest Ref Range: 1.1 - 2.2   0.7 (L)   ALT Latest Ref Range: 12 - 78 U/L 14   AST Latest Ref Range: 15 - 37 U/L 15   Alk. phosphatase Latest Ref Range: 45 - 117 U/L 150 (H)       Radiologic Studies -   XR KNEE RT 3 V   Final Result   1. No evidence of acute traumatic injury. 2. Evidence of mild degenerative change involving the right knee as described   above. CTA NECK   Final Result      1. Redemonstrated minimally displaced acute fractures of the right C3 and C5   transverse processes. No evidence of traumatic injury to the right vertebral   artery. 2. No evidence of significant stenosis. XR WRIST LT AP/LAT/OBL MIN 3V   Final Result   Soft tissue swelling over the dorsum of the wrist, with no acute fracture. .         XR HAND RT MIN 3 V   Final Result   No acute fracture. CT HEAD WO CONT   Final Result      Left periorbital soft tissue swelling. No acute intracranial hemorrhage. CT SPINE CERV WO CONT   Final Result      Acute fractures involving the right C3 and C5 transverse foramina. CT MAXILLOFACIAL WO CONT   Final Result      Left periorbital and anterior chin soft tissue swelling with no acute facial   bone fracture. See separate C-spine CT report regarding cervical spine   findings. .           CT Results  (Last 48 hours)    None      CTA Neck  1. Redemonstrated minimally displaced acute fractures of the right C3 and C5  transverse processes. No evidence of traumatic injury to the right vertebral  artery. 2. No evidence of significant stenosis. Medical Decision Making   I am the first provider for this patient. I reviewed the vital signs, available nursing notes, past medical history, past surgical history, family history and social history. Vital Signs-Reviewed the patient's vital signs.   No data found. Pulse Oximetry Analysis - 96% on RA      Records Reviewed: Nursing Notes, Old Medical Records, Previous Radiology Studies and Previous Laboratory Studies    Provider Notes (Medical Decision Making):   MDM: 60 yo woman in nursing home because of chronic LE edema, diabetes, and obesity, as well as intellectually challenged. CT of head, facial bones normal, as is the plain films of left wrist and right hand. CT of cervical spine, however, shows fx's of C3 and C5 transverse processes. Will discuss with spine surgery at [facility]. ED Course:   Initial assessment performed. The patients presenting problems have been discussed, and they are in agreement with the care plan formulated and outlined with them. I have encouraged them to ask questions as they arise throughout their visit. PROGRESS NOTE:  Hemodynamically and neurologically stable. After CT showed fx's of C3 and C5, the case was discussed with Dr. Mariann Hale at [facility], who advised that CT angiograms of neck be obtained. Left wrist and facial lacerations closed with steri strips. ED Course as of 04/01/22 1856   Wed Mar 30, 2022   1213 Patient signed out to Dr. María Richter at [phone]. CTA of neck pending. [VG]      ED Course User Index  [VG] Houston Foster MD        Discharge note:  Pt re-evaluated and noted to be feeling better, ready for discharge. Updated pt on all final lab and CT findings. Will follow up as instructed. All questions have been answered, pt voiced understanding and agreement with plan. Specific return precautions provided as well as instructions to return to the ED should sx worsen at any time. Vital signs stable for discharge. Critical Care Time:   0      Diagnosis     Clinical Impression:   1.  Closed nondisplaced fracture of third cervical vertebra, unspecified fracture morphology, initial encounter ([facility] [address]    2. Closed nondisplaced fracture of fifth cervical vertebra, unspecified fracture morphology, initial encounter ([facility] [address]    3. Facial laceration, initial encounter    4. Hematoma of jaw, initial encounter    5. Traumatic hematoma of left wrist, initial encounter    6. Avulsion of skin of left wrist, initial encounter        PLAN:  1. Discharge Medication List as of 3/30/2022 10:50 AM        2.    Follow-up Information     Follow up With Specialties Details Why Contact Info    Ezra Pearson MD Neurosurgery In 1 week  [address] [facility] [address] [facility]      Ning Banerjee MD Internal Medicine, Internal Medicine Schedule an appointment as soon as possible for a visit   Chelsy Murphy [address] [place] [address]  [phone]          Return to ED if worse     Disposition:  Home

## 2022-03-30 NOTE — ED NOTES
Spoke with pts. Sister, Leighann Blanco again, updated her on status of pt. And she said she was going to come visit pt.

## 2022-03-30 NOTE — ED TRIAGE NOTES
Pt arrived via EMS from the [facility] s/p fall prior to arrival. Unwitnessed fall with hematoma above left eye, with lac, hematoma to left lower forearm. Hematoma to left lower lip and chin. Bruising to right hand with report of tingling, bruising to left upper thigh and right knee.

## 2022-03-30 NOTE — ED NOTES
Charge Report/handoff to Ulysses Rous given at bedside. Informed of ED encounter summary, SBAR, Meds.

## 2022-03-30 NOTE — ED NOTES
TRANSFER - OUT REPORT:    Verbal report given to Elizabeth Menjivar LPN(name) on Jesus Cummins  being transferred to [address] [place] [address] for change in patient condition(nondisplaced cervical fracture of 3rd and 5th cervical vertebrea)       Report consisted of patients Situation, Background, Assessment and   Recommendations(SBAR). Information from the following report(s) SBAR, Kardex, ED Summary, MAR and Med Rec Status was reviewed with the receiving nurse. Lines:   Peripheral IV 03/30/22 Left Antecubital (Active)        Opportunity for questions and clarification was provided.       Patient transported with:   [facility] transport

## 2022-03-30 NOTE — ED NOTES
D/C instructions provided and reviewed with patient. Opportunity provided for discussion. All questions answered. Nothing further at this time.

## 2022-03-30 NOTE — PROGRESS NOTES
9459 - Dr. Amanda Raphael arranged ALS W/LIGHTS & SIRENS transportation from [facility] to [facility]. Arranged ALS transport w/[facility]) - advised to bring appropiate equipment (cardiiac monitor, IVF) - ETA of 0805 given - updated ED staff w/ETA    5178 - Flori ED RN advises to cancel ALS lights and siren per Dr. Amanda Raphael - spoke w/Sana at [facility] and advised to cancel reservation.

## 2022-03-30 NOTE — PROGRESS NOTES
CHAYA MARR 113 for transport back to the [facility], spoke with Sharon Okeefe at [facility].   ED notified of TEJINDER, spoke with [address]

## 2022-03-30 NOTE — ED NOTES
Received signout from Dr. Nirmal Gillespie on this patient that is in the emergency department after a fall. CTA of the cervical spine is pending. Patient has cervical spine transverse process fractures. CTA shows no arterial injury of the vertebral artery. Discussed my clinical impression(s), any labs and/or radiology results with the patient. I answered any questions and addressed any concerns. Discussed the importance of following up with their primary care physician and/or specialist(s). Discussed signs or symptoms that would warrant return back to the ER for further evaluation. The patient is agreeable with discharge.     Theresa Potts MD

## 2022-03-30 NOTE — ED NOTES
Pt returned from CT/Xray stable. Wounds to area above left eye, left lower lip/chin, left lower forearm cleansed with normal saline and patted dry. Sterile dressing applied to wound above left eye  - are with small amount of bleeding -  Pressure held at site and bleeding stopped. Pt tolerated without incidence.

## 2022-04-21 ENCOUNTER — TRANSCRIBE ORDER (OUTPATIENT)
Dept: SCHEDULING | Age: 62
End: 2022-04-21

## 2022-04-21 DIAGNOSIS — S12.9XXA CLOSED FRACTURE OF CERVICAL VERTEBRA WITHOUT SPINAL CORD INJURY (HCC): Primary | ICD-10-CM

## 2022-04-25 ENCOUNTER — HOSPITAL ENCOUNTER (OUTPATIENT)
Dept: CT IMAGING | Age: 62
Discharge: HOME | End: 2022-04-25
Attending: INTERNAL MEDICINE
Payer: MEDICARE

## 2022-04-25 DIAGNOSIS — S12.9XXA CLOSED FRACTURE OF CERVICAL VERTEBRA WITHOUT SPINAL CORD INJURY (HCC): ICD-10-CM

## 2022-04-25 PROCEDURE — 72125 CT NECK SPINE W/O DYE: CPT

## 2022-11-09 ENCOUNTER — TRANSCRIBE ORDER (OUTPATIENT)
Dept: SCHEDULING | Age: 62
End: 2022-11-09

## 2022-11-09 DIAGNOSIS — G89.4 CHRONIC PAIN SYNDROME: Primary | ICD-10-CM

## 2022-11-09 DIAGNOSIS — R11.10 VOMITING: ICD-10-CM

## 2022-11-09 DIAGNOSIS — R10.9 ABDOMINAL PAIN: Primary | ICD-10-CM

## 2022-11-09 DIAGNOSIS — R11.0 NAUSEA: ICD-10-CM

## 2022-11-16 ENCOUNTER — HOSPITAL ENCOUNTER (OUTPATIENT)
Dept: CT IMAGING | Age: 62
Discharge: HOME | End: 2022-11-16
Attending: INTERNAL MEDICINE
Payer: MEDICARE

## 2022-11-16 DIAGNOSIS — R10.9 ABDOMINAL PAIN: ICD-10-CM

## 2022-11-16 DIAGNOSIS — R11.10 VOMITING: ICD-10-CM

## 2022-11-16 DIAGNOSIS — R11.0 NAUSEA: ICD-10-CM

## 2022-11-16 PROCEDURE — 74176 CT ABD & PELVIS W/O CONTRAST: CPT

## 2025-01-24 NOTE — PROGRESS NOTES
[facility] Name: Nelda Rey MRN: [id number] : 1960 Hospital: [place] [address] Date: 2018 IMPRESSION:  
· Chronic hypercapnic respiratory failure · Appears to have an acute metabolic acidosis superimposed on chronically elevated serum bicarbonate · NSTEMI - LHC negative · Nausea/vomiting · COPD - no details available, not on treatment at home, not clearly exacerbated, but may already be better due to treatment · Acute/chronic renal failure · DM · Tobacco use RECOMMENDATIONS:  
· O2 - wean off · Bronchodilators · Steroid taper · Insulin · DVT prophylaxis · Will check back 18 or sooner PRN Subjective:  
 
18: no events overnight. Just back from [facility] (negative). Denies shortness of breath at this time but coughing some. Initial history: This patient has been seen and evaluated at the request of Dr. Anne Gonzalez for \"on BiPAP\". Patient is a 59 y.o. female with a history of COPD (no details available), who presented with nausea and vomiting and a positive troponin. Reportedly had respiratory distress as well. She is a limited historian, but she denies having any shortness of breath or chest pain. She was started on BiPAP which she tolerated poorly. She is off of it now and denies any dyspnea. She reports she is on no inhalers for her COPD. Actively smokes. Past Medical History:  
Diagnosis Date  Arthritis  Asthma  Bronchitis  GERD (gastroesophageal reflux disease)  Hypercholesterolemia  Hypertension  Hypothyroid  IDDM (insulin dependent diabetes mellitus) ([facility] [address]  Morbid obesity ([facility] [address]  Peripheral neuropathy  Thyroid disease  Venous insufficiency Past Surgical History:  
Procedure Laterality Date  HX AMPUTATION Bilateral   
 toes  HX COLONOSCOPY   Prior to Admission medications with patient Medication Sig Start Date End Date Taking? Authorizing Provider  
potassium chloride (K-DUR, KLOR-CON) 20 mEq tablet Take 20 mEq by mouth every other day. Alternates day with torsemide   Yes Provider, Historical  
torsemide (DEMADEX) 20 mg tablet Take 20 mg by mouth every other day. Alternates administration w/ potassium supplement   Yes Provider, Historical  
silver sulfADIAZINE (SILVADENE) 1 % topical cream Apply 1 Each to affected area every Monday, Wednesday, Friday. Apply to legs   Yes Provider, Historical  
zinc 50 mg tab tablet Take 50 mg by mouth daily. 3/23/18  Yes Provider, Historical  
insulin aspart U-100 (NOVOLOG FLEXPEN U-100 INSULIN) 100 unit/mL inpn 5 Units by SubCUTAneous route Before breakfast, lunch, and dinner. + SSI TIDAC: 
140-199=2 u           
200-249=3 u 
250-299=5 u 
300-349=8 u 
Patient taking differently: 5 Units by SubCUTAneous route Before breakfast, lunch, and dinner. Sliding Scale  
under none 150-200 5 units 201-250 10 units 251-300 15 units 301-350 20 units 351-400 25 units 
over 400 call MD 3/20/18  Yes Duncan Multani MD  
levothyroxine (SYNTHROID) 150 mcg tablet Take 150 mcg by mouth Daily (before breakfast). Yes Provider, Historical  
gabapentin (NEURONTIN) 300 mg capsule Take 600 mg by mouth nightly. Yes Provider, Historical  
insulin glargine (LANTUS) 100 unit/mL injection 12 Units by SubCUTAneous route daily (after dinner). Patient takes at 1830 each day   Yes Provider, Historical  
ipratropium-albuterol (COMBIVENT RESPIMAT)  mcg/actuation inhaler Take 1 Puff by inhalation every six (6) hours as needed for Shortness of Breath. Yes Provider, Historical  
aspirin delayed-release 81 mg tablet Take 81 mg by mouth daily. Yes Provider, Historical  
 
No Known Allergies Social History Tobacco Use  Smoking status: Current Every Day Smoker Packs/day: 0.50 Types: Cigarettes Last attempt to quit: 2012 Years since quittin.9  Smokeless tobacco: Never Used Substance Use Topics  Alcohol use: Yes Comment: social  
  
Family History Problem Relation Age of Onset  COPD Father  Alcohol abuse Father Current Facility-Administered Medications Medication Dose Route Frequency  albuterol-ipratropium (DUO-NEB) 2.5 MG-0.5 MG/3 ML  3 mL Nebulization QID RT  
 0.9% sodium chloride infusion  100 mL/hr IntraVENous CONTINUOUS  
 sodium chloride (NS) flush 5-10 mL  5-10 mL IntraVENous Q8H  
 docusate sodium (COLACE) capsule 100 mg  100 mg Oral BID  aspirin delayed-release tablet 81 mg  81 mg Oral DAILY  insulin lispro (HUMALOG) injection   SubCUTAneous AC&HS  levothyroxine (SYNTHROID) tablet 150 mcg  150 mcg Oral ACB  insulin glargine (LANTUS) injection 12 Units  12 Units SubCUTAneous PCD  gabapentin (NEURONTIN) capsule 600 mg  600 mg Oral QHS  metoprolol tartrate (LOPRESSOR) tablet 12.5 mg  12.5 mg Oral Q12H  
 atorvastatin (LIPITOR) tablet 20 mg  20 mg Oral QHS  methylPREDNISolone (PF) (SOLU-MEDROL) injection 40 mg  40 mg IntraVENous Q12H Review of Systems: A comprehensive review of systems was negative except for that written in the HPI. Objective: 
Vital Signs:   
Visit Vitals /81 Pulse 61 Temp 97.6 °F (36.4 °C) Resp 15 Ht 5' 1\" (1.549 m) Wt 104 kg (229 lb 4.5 oz) SpO2 97% BMI 43.32 kg/m² O2 Device: Nasal cannula O2 Flow Rate (L/min): 3 l/min Temp (24hrs), Av °F (36.7 °C), Min:97.5 °F (36.4 °C), Max:98.6 °F (37 °C) Intake/Output:  
Last shift:      No intake/output data recorded. Last 3 shifts:  1901 -  0700 In: 2693.3 [I.V.:2693.3] Out: 1075 [[id number]] Intake/Output Summary (Last 24 hours) at 2018 6030 Last data filed at 2018 8920 Gross per 24 hour Intake 2693.33 ml Output 400 ml Net 2293.33 ml Physical Exam:  
General:  Alert, cooperative, no distress Head:  Normocephalic, without obvious abnormality, atraumatic. Eyes:  Conjunctivae/corneas clear. Nose: Nares normal. Septum midline. Mucosa normal.    
Throat: Lips, mucosa, and tongue normal.    
Neck: Supple, symmetrical, trachea midline Back:   Symmetric, no curvature. ROM normal.  
Lungs:   Clear to auscultation bilaterally. Chest wall:  No tenderness or deformity. Heart:  Regular rate and rhythm Abdomen:   Soft, non-tender. Bowel sounds normal.    
Extremities: Extremities normal, atraumatic, no cyanosis, 3+ chronic lower extremity edema Skin: Skin color, texture, turgor normal. No rashes or lesions Neurologic: Grossly nonfocal  
 
Data:  
Labs: 
Recent Labs 11/16/18 
0501 11/15/18 
0451 11/14/18 
2637 WBC 11.8* 9.1 10.7 HGB 11.3* 12.0 12.8 HCT 36.9 39.8 43.3  262 218 Recent Labs 11/16/18 
0501 11/15/18 
0451 11/14/18 
8226 * 131* 140  
K 4.4 5.4* 4.7 CL 97 95* 98  
CO2 29 29 31 * 271* 205* BUN 58* 51* 33* CREA 1.82* 1.77* 1.49* CA 8.6 8.2* 9.0 MG 2.5*  --  1.7 PHOS 3.1  --   --   
ALB  --  2.8* 3.2* TBILI  --  0.8 0.7 SGOT  --  13* 28 ALT  --  17 20 INR  --   --  1.0 Recent Labs 11/15/18 
0400 11/14/18 
1700 PH 7.31* 7.21* PCO2 60* 59* PO2 94 59* HCO3 29* 23 FIO2 50  --   
 
 
Imaging: 
I have personally reviewed the patients radiographs: 
None today Kacy Sommer MD